# Patient Record
Sex: MALE | Race: WHITE | Employment: OTHER | URBAN - METROPOLITAN AREA
[De-identification: names, ages, dates, MRNs, and addresses within clinical notes are randomized per-mention and may not be internally consistent; named-entity substitution may affect disease eponyms.]

---

## 2017-02-16 ENCOUNTER — OFFICE VISIT (OUTPATIENT)
Dept: FAMILY MEDICINE | Facility: CLINIC | Age: 72
End: 2017-02-16

## 2017-02-16 VITALS
TEMPERATURE: 98.3 F | BODY MASS INDEX: 24.4 KG/M2 | HEIGHT: 68 IN | SYSTOLIC BLOOD PRESSURE: 138 MMHG | HEART RATE: 66 BPM | DIASTOLIC BLOOD PRESSURE: 70 MMHG | OXYGEN SATURATION: 98 % | WEIGHT: 161 LBS

## 2017-02-16 DIAGNOSIS — J43.9 PULMONARY EMPHYSEMA, UNSPECIFIED EMPHYSEMA TYPE (H): ICD-10-CM

## 2017-02-16 DIAGNOSIS — E78.2 MIXED HYPERLIPIDEMIA: ICD-10-CM

## 2017-02-16 DIAGNOSIS — Z23 NEED FOR VACCINATION: ICD-10-CM

## 2017-02-16 DIAGNOSIS — I10 ESSENTIAL HYPERTENSION, BENIGN: Primary | ICD-10-CM

## 2017-02-16 DIAGNOSIS — I25.10 ATHEROSCLEROSIS OF NATIVE CORONARY ARTERY OF NATIVE HEART WITHOUT ANGINA PECTORIS: ICD-10-CM

## 2017-02-16 PROCEDURE — 90670 PCV13 VACCINE IM: CPT | Performed by: FAMILY MEDICINE

## 2017-02-16 PROCEDURE — 80053 COMPREHEN METABOLIC PANEL: CPT | Mod: 90 | Performed by: FAMILY MEDICINE

## 2017-02-16 PROCEDURE — 90471 IMMUNIZATION ADMIN: CPT | Performed by: FAMILY MEDICINE

## 2017-02-16 PROCEDURE — 36415 COLL VENOUS BLD VENIPUNCTURE: CPT | Performed by: FAMILY MEDICINE

## 2017-02-16 PROCEDURE — 80061 LIPID PANEL: CPT | Mod: 90 | Performed by: FAMILY MEDICINE

## 2017-02-16 PROCEDURE — 99214 OFFICE O/P EST MOD 30 MIN: CPT | Mod: 25 | Performed by: FAMILY MEDICINE

## 2017-02-16 RX ORDER — AMLODIPINE BESYLATE 5 MG/1
5 TABLET ORAL DAILY
Qty: 90 TABLET | Refills: 1 | Status: SHIPPED | OUTPATIENT
Start: 2017-02-16 | End: 2017-08-24

## 2017-02-16 RX ORDER — ATENOLOL 50 MG/1
50 TABLET ORAL DAILY
Qty: 90 TABLET | Refills: 1 | Status: SHIPPED | OUTPATIENT
Start: 2017-02-16 | End: 2017-11-27

## 2017-02-16 RX ORDER — SIMVASTATIN 20 MG
20 TABLET ORAL AT BEDTIME
Qty: 90 TABLET | Refills: 1 | Status: SHIPPED | OUTPATIENT
Start: 2017-02-16 | End: 2017-08-24

## 2017-02-16 NOTE — MR AVS SNAPSHOT
"              After Visit Summary   2/16/2017    Roberto Carlos Still    MRN: [id number]           Patient Information     Date Of Birth          1945        Visit Information        Provider Department      2/16/2017 9:30 AM Leonard Palacios MD [facility]        Today's Diagnoses     Essential hypertension, benign    -  1    Atherosclerosis of native coronary artery of native heart without angina pectoris        Mixed hyperlipidemia        Pulmonary emphysema, unspecified emphysema type (H)        Need for vaccination           Follow-ups after your visit        Follow-up notes from your care team     Return in about 6 months (around 8/16/2017).      Who to contact     If you have questions or need follow up information about today's clinic visit or your schedule please contact [facility] directly at [phone].  Normal or non-critical lab and imaging results will be communicated to you by MyChart, letter or phone within 4 business days after the clinic has received the results. If you do not hear from us within 7 days, please contact the clinic through MyChart or phone. If you have a critical or abnormal lab result, we will notify you by phone as soon as possible.  Submit refill requests through [employer] or call your pharmacy and they will forward the refill request to us. Please allow 3 business days for your refill to be completed.          Additional Information About Your Visit        Care EveryWhere ID     This is your Care EveryWhere ID. This could be used by other organizations to access your [facility] medical records  [id number]        Your Vitals Were     Pulse Temperature Height Pulse Oximetry BMI (Body Mass Index)       66 98.3  F (36.8  C) (Oral) 1.727 m (5' 8\") 98% 24.48 kg/m2        Blood Pressure from Last 3 Encounters:   02/16/17 138/70   08/29/16 122/70   03/29/16 130/80    Weight from Last 3 Encounters:   02/16/17 73 kg (161 lb)   08/29/16 " 71.7 kg (158 lb)   03/29/16 73.5 kg (162 lb 2 oz)              We Performed the Following     COMPREHENSIVE METABOLIC PANEL (QUEST) XCMP     Lipid Profile (QUEST)     PNEUMOCOCCAL CONJ VACCINE 13 VALENT IM     VACCINE ADMINISTRATION, INITIAL     VENOUS COLLECTION          Where to get your medicines      These medications were sent to [facility] - [place], [place] - [address]  [address] [place] , [facility] [address]    Hours:  24-hours Phone:  [phone]     amLODIPine 5 MG tablet    atenolol 50 MG tablet    simvastatin 20 MG tablet          Primary Care Provider Office Phone # Fax #    Leonard Palacios [phone]       [facility] [address] [facility] [address]  [facility] [address]        Thank you!     Thank you for choosing [facility]  for your care. Our goal is always to provide you with excellent care. Hearing back from our patients is one way we can continue to improve our services. Please take a few minutes to complete the written survey that you may receive in the mail after your visit with us. Thank you!             Your Updated Medication List - Protect others around you: Learn how to safely use, store and throw away your medicines at www.disposemymeds.org.          This list is accurate as of: 2/16/17 11:43 AM.  Always use your most recent med list.                   Brand Name Dispense Instructions for use    albuterol (2.5 MG/3ML) 0.083% neb solution          amLODIPine 5 MG tablet    NORVASC    90 tablet    Take 1 tablet (5 mg) by mouth daily       aspirin 325 MG EC tablet     100    1 tab po QD (Once per day)       atenolol 50 MG tablet    TENORMIN    90 tablet    Take 1 tablet (50 mg) by mouth daily       Ipratropium-Albuterol  MCG/ACT inhaler    COMBIVENT RESPIMAT    12 g    Inhale 1 puff into the lungs 4 times daily. Not to exceed 6 doses per day.       order for DME     1 Device    Equipment  being ordered: Oxygen and overnight oxymetry       predniSONE 10 MG tablet    DELTASONE         simvastatin 20 MG tablet    ZOCOR    90 tablet    Take 1 tablet (20 mg) by mouth At Bedtime

## 2017-02-16 NOTE — LETTER
[facility]  [facility] [address]  [place]  [address]    February 21, 2017            Roberto Carlos Still  [address] [address] [facility] [address] [address]                  Dear Roberto Carlos Still      LAB RESULTS:     The results of your recent Blood Sugar, Kidney Tests, Lipid profile and Liver Panel were NORMAL.  Looks great!   If you have any further questions or problems, please contact our office at [phone].          Leonard Palacios MD

## 2017-02-16 NOTE — PROGRESS NOTES
SUBJECTIVE:                                                    Roberto Carlos Still is a 72 year old male who presents to clinic today for the following health issues:        Hyperlipidemia Follow-Up      Rate your low fat/cholesterol diet?: good    Taking statin?  Yes, no muscle aches from statin    Other lipid medications/supplements?:  none     Hypertension Follow-up      Outpatient blood pressures are not being checked.    Low Salt Diet: no added salt     Vascular Disease Follow-up:  Coronary Artery Disease (CAD)      Chest pain or pressure, left side neck or arm pain: No    Shortness of breath/increased sweats/nausea with exertion: No    Pain in calves walking 1-2 blocks: No    Worsened or new symptoms since last visit: No    Nitroglycerin use: no    Daily aspirin use: Yes     COPD Follow-Up    Symptoms are currently: stable    Current fatigue or dyspnea with ambulation: none    Shortness of breath: stable    Increased or change in Cough/Sputum: No    Fever(s): No        Any ER/UC or hospital admissions since your last visit? No     History   Smoking Status     Former Smoker     Packs/day: 0.50     Years: 35.00   Smokeless Tobacco     Never Used     No results found for: FEV1, HPY3HWE       Amount of exercise or physical activity: 2-3 days/week for an average of 15-30 minutes    Problems taking medications regularly: No    Medication side effects: none  Diet: regular (no restrictions)        Problem list and histories reviewed & adjusted, as indicated.  Additional history: as documented    Patient Active Problem List   Diagnosis     Coronary atherosclerosis     Essential hypertension, benign     ACP (advance care planning)     Health Care Home     COPD (chronic obstructive pulmonary disease) (H)     Mixed hyperlipidemia     Past Surgical History   Procedure Laterality Date     C repr asd w bypass  5/97     One vessel angioplasty and stint     C appendectomy       Eye exam established pt  12/06     Hcl psa,  diagnostic (tumor marker)  3/06     normal     Removal of sperm duct(s)       Vasectomy     Hc colonoscopy thru stoma, diagnostic  6/05       Social History   Substance Use Topics     Smoking status: Former Smoker     Packs/day: 0.50     Years: 35.00     Smokeless tobacco: Never Used     Alcohol use 7.0 oz/week     Family History   Problem Relation Age of Onset     Alzheimer Disease Mother      HEART DISEASE Father      CANCER No family hx of      DIABETES No family hx of          Current Outpatient Prescriptions   Medication Sig Dispense Refill     amLODIPine (NORVASC) 5 MG tablet Take 1 tablet (5 mg) by mouth daily 90 tablet 1     atenolol (TENORMIN) 50 MG tablet Take 1 tablet (50 mg) by mouth daily 90 tablet 1     simvastatin (ZOCOR) 20 MG tablet Take 1 tablet (20 mg) by mouth At Bedtime 90 tablet 1     ASPIRIN 325 MG OR TBEC 1 tab po QD (Once per day) 100 3     [DISCONTINUED] amLODIPine (NORVASC) 5 MG tablet Take 1 tablet (5 mg) by mouth daily 90 tablet 1     [DISCONTINUED] atenolol (TENORMIN) 50 MG tablet Take 1 tablet (50 mg) by mouth daily 90 tablet 1     [DISCONTINUED] simvastatin (ZOCOR) 20 MG tablet Take 1 tablet (20 mg) by mouth At Bedtime 90 tablet 1     order for DME Equipment being ordered: Oxygen and overnight oxymetry 1 Device 0     albuterol (2.5 MG/3ML) 0.083% nebulizer solution   0     predniSONE (DELTASONE) 10 MG tablet   0     Ipratropium-Albuterol (COMBIVENT RESPIMAT)  MCG/ACT inhaler Inhale 1 puff into the lungs 4 times daily. Not to exceed 6 doses per day. 12 g 0     Allergies   Allergen Reactions     No Known Allergies      Recent Labs   Lab Test  08/29/16   0920  03/01/16   0931  09/01/15   0945   LDL  71  79  76   HDL  81  79  72   TRIG  45  48  59   ALT  14  12  12   CR  0.87  0.83  0.82   GFRESTIMATED  86  87  88   POTASSIUM  4.6  4.9  4.7      BP Readings from Last 3 Encounters:   02/16/17 138/70   08/29/16 122/70   03/29/16 130/80    Wt Readings from Last 3 Encounters:   02/16/17  "73 kg (161 lb)   08/29/16 71.7 kg (158 lb)   03/29/16 73.5 kg (162 lb 2 oz)                    ROS:  Constitutional, HEENT, cardiovascular, pulmonary, gi and gu systems are negative, except as otherwise noted.    OBJECTIVE:                                                    /70 (BP Location: Right arm, Cuff Size: Adult Large)  Pulse 66  Temp 98.3  F (36.8  C) (Oral)  Ht 1.727 m (5' 8\")  Wt 73 kg (161 lb)  SpO2 98%  BMI 24.48 kg/m2  Body mass index is 24.48 kg/(m^2).   GENERAL: healthy, alert and no distress  NECK: no adenopathy, no asymmetry, masses, or scars and thyroid normal to palpation  RESP: lungs clear to auscultation - no rales, rhonchi or wheezes  CV: regular rate and rhythm, normal S1 S2, no S3 or S4, no murmur, click or rub, no peripheral edema and peripheral pulses strong  ABDOMEN: soft, nontender, no hepatosplenomegaly, no masses and bowel sounds normal  MS: no gross musculoskeletal defects noted, no edema         ASSESSMENT:                                                        PLAN:                                                    (I10) Essential hypertension, benign  (primary encounter diagnosis)  Comment: well controlled  Plan: amLODIPine (NORVASC) 5 MG tablet, atenolol         (TENORMIN) 50 MG tablet, COMPREHENSIVE         METABOLIC PANEL (QUEST) XCMP, VENOUS COLLECTION        continue current medications at current doses     (I25.10) Atherosclerosis of native coronary artery of native heart without angina pectoris  Comment: stable symptomatically   Plan: atenolol (TENORMIN) 50 MG tablet, simvastatin         (ZOCOR) 20 MG tablet, Lipid Profile (QUEST),         COMPREHENSIVE METABOLIC PANEL (QUEST) XCMP,         VENOUS COLLECTION        continue current medications at current doses     (E78.2) Mixed hyperlipidemia  Comment: stable symptomatically   Plan: simvastatin (ZOCOR) 20 MG tablet, Lipid Profile        (QUEST), VENOUS COLLECTION        continue current medications at current doses " "pending labs        (J43.9) Pulmonary emphysema, unspecified emphysema type (H)  Comment: stable symptomatically , followed by Dr Whittington  Plan: continue current medications at current doses -overdue for f/u    (Z23) Need for vaccination  Comment:   Plan: PNEUMOCOCCAL CONJ VACCINE 13 VALENT IM, VACCINE        ADMINISTRATION, INITIAL               BMI:   Estimated body mass index is 24.48 kg/(m^2) as calculated from the following:    Height as of this encounter: 1.727 m (5' 8\").    Weight as of this encounter: 73 kg (161 lb).   Weight management plan: Discussed healthy diet and exercise guidelines and patient will follow up in 6 months in clinic to re-evaluate.      FUTURE APPOINTMENTS:       - Follow-up visit in 6mo  Work on weight loss  Regular exercise    Leonard Palacios MD  [facility]      "

## 2017-02-16 NOTE — NURSING NOTE
Roberto Carlos Still is here today for a fasting med check    Pre-Visit Screening :  Immunizations : up to date (Prevnar 13 needed today)  Colon Screening : Patient Declines  Asthma Action Test/Plan : NA  PHQ9/GAD7 :  Up to date  BP done on the right arm, with a large sized cuff.  Pulse - regular  My Chart - declines    CLASSIFICATION OF OVERWEIGHT AND OBESITY BY BMI                         Obesity Class           BMI(kg/m2)  Underweight                                    < 18.5  Normal                                         18.5-24.9  Overweight                                     25.0-29.9  OBESITY                     I                  30.0-34.9                              II                 35.0-39.9  EXTREME OBESITY             III                >40                             Patient's  BMI Body mass index is 24.48 kg/(m^2).  http://hin.nhlbi.nih.gov/menuplanner/menu.cgi  Questioned patient about current smoking habits.  Pt. quit smoking some time ago.  Marguerite Dalton, [facility]

## 2017-02-17 LAB
ALBUMIN SERPL-MCNC: 4.1 G/DL (ref 3.6–5.1)
ALBUMIN/GLOB SERPL: 1.6 (CALC) (ref 1–2.5)
ALP SERPL-CCNC: 74 U/L (ref 40–115)
ALT SERPL-CCNC: 11 U/L (ref 9–46)
AST SERPL-CCNC: 19 U/L (ref 10–35)
BILIRUB SERPL-MCNC: 1.2 MG/DL (ref 0.2–1.2)
BUN SERPL-MCNC: 11 MG/DL (ref 7–25)
BUN/CREATININE RATIO: ABNORMAL (CALC) (ref 6–22)
CALCIUM SERPL-MCNC: 9.2 MG/DL (ref 8.6–10.3)
CHLORIDE SERPLBLD-SCNC: 100 MMOL/L (ref 98–110)
CHOLEST SERPL-MCNC: 162 MG/DL (ref 125–200)
CHOLEST/HDLC SERPL: 2 (CALC)
CO2 SERPL-SCNC: 32 MMOL/L (ref 20–31)
CREAT SERPL-MCNC: 0.74 MG/DL (ref 0.7–1.18)
EGFR AFRICAN AMERICAN - QUEST: 105 ML/MIN/1.73M2
GFR SERPL CREATININE-BSD FRML MDRD: 91 ML/MIN/1.73M2
GLOBULIN, CALCULATED - QUEST: 2.5 G/DL (CALC) (ref 1.9–3.7)
GLUCOSE - QUEST: 94 MG/DL (ref 65–99)
HDLC SERPL-MCNC: 82 MG/DL
LDLC SERPL CALC-MCNC: 71 MG/DL (CALC)
NONHDLC SERPL-MCNC: 80 MG/DL (CALC)
POTASSIUM SERPL-SCNC: 5.2 MMOL/L (ref 3.5–5.3)
PROT SERPL-MCNC: 6.6 G/DL (ref 6.1–8.1)
SODIUM SERPL-SCNC: 140 MMOL/L (ref 135–146)
TRIGL SERPL-MCNC: 43 MG/DL

## 2017-03-31 ENCOUNTER — TRANSFERRED RECORDS (OUTPATIENT)
Dept: FAMILY MEDICINE | Facility: CLINIC | Age: 72
End: 2017-03-31

## 2017-08-24 ENCOUNTER — OFFICE VISIT (OUTPATIENT)
Dept: FAMILY MEDICINE | Facility: CLINIC | Age: 72
End: 2017-08-24

## 2017-08-24 VITALS
WEIGHT: 159.6 LBS | OXYGEN SATURATION: 96 % | TEMPERATURE: 98.5 F | HEART RATE: 58 BPM | HEIGHT: 68 IN | BODY MASS INDEX: 24.19 KG/M2 | SYSTOLIC BLOOD PRESSURE: 148 MMHG | DIASTOLIC BLOOD PRESSURE: 76 MMHG

## 2017-08-24 DIAGNOSIS — I25.10 ATHEROSCLEROSIS OF NATIVE CORONARY ARTERY OF NATIVE HEART WITHOUT ANGINA PECTORIS: ICD-10-CM

## 2017-08-24 DIAGNOSIS — E78.2 MIXED HYPERLIPIDEMIA: ICD-10-CM

## 2017-08-24 DIAGNOSIS — J43.9 PULMONARY EMPHYSEMA, UNSPECIFIED EMPHYSEMA TYPE (H): Primary | ICD-10-CM

## 2017-08-24 DIAGNOSIS — I10 ESSENTIAL HYPERTENSION, BENIGN: ICD-10-CM

## 2017-08-24 PROCEDURE — 80061 LIPID PANEL: CPT | Mod: 90 | Performed by: FAMILY MEDICINE

## 2017-08-24 PROCEDURE — 36415 COLL VENOUS BLD VENIPUNCTURE: CPT | Performed by: FAMILY MEDICINE

## 2017-08-24 PROCEDURE — 80053 COMPREHEN METABOLIC PANEL: CPT | Mod: 90 | Performed by: FAMILY MEDICINE

## 2017-08-24 PROCEDURE — 99214 OFFICE O/P EST MOD 30 MIN: CPT | Performed by: FAMILY MEDICINE

## 2017-08-24 RX ORDER — METOPROLOL SUCCINATE 100 MG/1
100 TABLET, EXTENDED RELEASE ORAL DAILY
Qty: 90 TABLET | Refills: 1 | Status: SHIPPED | OUTPATIENT
Start: 2017-08-24 | End: 2019-03-14

## 2017-08-24 RX ORDER — AMLODIPINE BESYLATE 5 MG/1
5 TABLET ORAL DAILY
Qty: 90 TABLET | Refills: 1 | Status: SHIPPED | OUTPATIENT
Start: 2017-08-24 | End: 2018-02-27

## 2017-08-24 RX ORDER — SIMVASTATIN 20 MG
20 TABLET ORAL AT BEDTIME
Qty: 90 TABLET | Refills: 1 | Status: SHIPPED | OUTPATIENT
Start: 2017-08-24 | End: 2018-02-27

## 2017-08-24 NOTE — LETTER
August 28, 2017      Roberto Carlos Still  [address]  [place] [address]            Roberto Carlos Still     The results of your recent Kidney Tests, Lipid profile and Liver Panel were within normal limits.    The blood sugar is also still running high so make sure you are working to decrease sugars and carbohydrates in your diet and exercising regularly.       The results are now released on My Chart. Please contact me if you have further questions or concerns.    Sincerely,    NALLELY Palacios M.D.     Resulted Orders   Lipid Profile (QUEST)   Result Value Ref Range    Cholesterol 167 <200 mg/dL    HDL Cholesterol 78 >40 mg/dL    Triglycerides 44 <150 mg/dL    LDL Cholesterol Calculated 76 mg/dL (calc)      Comment:      Reference range: <100     Desirable range <100 mg/dL for patients with CHD or  diabetes and <70 mg/dL for diabetic patients with  known heart disease.     The Edilson calculation is a validated novel   method that provides better accuracy than the   Friedewald equation in the estimation of LDL-C,   particularly when TG levels are 150-400 mg/dL and   LDL-C levels are lower than 70 mg/dL.  Reference:  Satya MCCONNELL et al. Comparison of a Novel   Method vs the Friedewald Equation for Estimating   Low-Density Lipoprotein Cholesterol Levels From the   Standard Lipid Profile. LAI. 2013;310(19): 7218-3991.     For additional information, please refer to  http://education.[employer]/faq/LFT467  (This link is being provided for informational/  educational purposes only.)      Cholesterol/HDL Ratio 2.1 <5.0 (calc)    Non HDL Cholesterol 89 <130 mg/dL (calc)      Comment:      For patients with diabetes plus 1 major ASCVD risk   factor, treating to a non-HDL-C goal of <100 mg/dL   (LDL-C of <70 mg/dL) is considered a therapeutic   option.     COMPREHENSIVE METABOLIC PANEL (QUEST) XCMP   Result Value Ref Range    Glucose 104 (H) 65 - 99 mg/dL      Comment:                     Fasting reference interval     For someone without known diabetes, a glucose value  between 100 and 125 mg/dL is consistent with  prediabetes and should be confirmed with a  follow-up test.         Urea Nitrogen 11 7 - 25 mg/dL    Creatinine 0.68 (L) 0.70 - 1.18 mg/dL      Comment:      For patients >49 years of age, the reference limit  for Creatinine is approximately 13% higher for people  identified as -American.         GFR Estimate 94 > OR = 60 mL/min/1.73m2    EGFR African American 109 > OR = 60 mL/min/1.73m2    BUN/Creatinine Ratio 16 6 - 22 (calc)    Sodium 139 135 - 146 mmol/L    Potassium 4.7 3.5 - 5.3 mmol/L    Chloride 98 98 - 110 mmol/L    Carbon Dioxide 32 (H) 20 - 31 mmol/L    Calcium 9.5 8.6 - 10.3 mg/dL    Protein Total 6.8 6.1 - 8.1 g/dL    Albumin 4.4 3.6 - 5.1 g/dL    Globulin Calculated 2.4 1.9 - 3.7 g/dL (calc)    A/G Ratio 1.8 1.0 - 2.5 (calc)    Bilirubin Total 1.2 0.2 - 1.2 mg/dL    Alkaline Phosphatase 73 40 - 115 U/L    AST 21 10 - 35 U/L    ALT 14 9 - 46 U/L       If you have any questions or concerns, please call the clinic at the number listed above.       Sincerely,        Leonard Palacios MD

## 2017-08-24 NOTE — NURSING NOTE
Roberto Carlos Still is here today for a fasting medication recheck.    Pre-Visit Screening :  Immunizations : up to date  Colon Screening : Patient Declines  Asthma Action Test/Plan : Up to date  PHQ9/GAD7 :  NA    Pulse - regular  My Chart - declines    CLASSIFICATION OF OVERWEIGHT AND OBESITY BY BMI                         Obesity Class           BMI(kg/m2)  Underweight                                    < 18.5  Normal                                         18.5-24.9  Overweight                                     25.0-29.9  OBESITY                     I                  30.0-34.9                              II                 35.0-39.9  EXTREME OBESITY             III                >40                             Patient's  BMI Body mass index is 24.27 kg/(m^2).  http://hin.nhlbi.nih.gov/menuplanner/menu.cgi  Questioned patient about current smoking habits.  Pt. quit smoking some time ago.    Marguerite Dalton, [facility]

## 2017-08-24 NOTE — PROGRESS NOTES
SUBJECTIVE:                                                    Roberto Carlos Still is a 72 year old male who presents to clinic today for the following health issues:      Hyperlipidemia Follow-Up      Rate your low fat/cholesterol diet?: good    Taking statin?  Yes, no muscle aches from statin    Other lipid medications/supplements?:  none    Hypertension Follow-up      Outpatient blood pressures are not being checked.    Low Salt Diet: no added salt    Vascular Disease Follow-up:  Coronary Artery Disease (CAD)      Chest pain or pressure, left side neck or arm pain: No    Shortness of breath/increased sweats/nausea with exertion: No    Pain in calves walking 1-2 blocks: No    Worsened or new symptoms since last visit: No    Nitroglycerin use: no    Daily aspirin use: Yes    COPD Follow-Up    Symptoms are currently: stable    Current fatigue or dyspnea with ambulation: none    Shortness of breath: stable    Increased or change in Cough/Sputum: No    Fever(s): No        Any ER/UC or hospital admissions since your last visit? No     History   Smoking Status     Former Smoker     Packs/day: 0.50     Years: 35.00   Smokeless Tobacco     Never Used     No results found for: FEV1, EIN3QDB            Amount of exercise or physical activity: 4-5 days/week for an average of 15-30 minutes    Problems taking medications regularly: No    Medication side effects: none  Diet: regular (no restrictions)          Problem list and histories reviewed & adjusted, as indicated.  Additional history: as documented    Patient Active Problem List   Diagnosis     Coronary atherosclerosis     Essential hypertension, benign     ACP (advance care planning)     Health Care Home     COPD (chronic obstructive pulmonary disease) (H)     Mixed hyperlipidemia     Past Surgical History:   Procedure Laterality Date     C APPENDECTOMY       C REPR ASD W BYPASS  5/97    One vessel angioplasty and stint     EYE EXAM ESTABLISHED PT  12/06     HC COLONOSCOPY  THRU STOMA, DIAGNOSTIC  6/05     HCL PSA, DIAGNOSTIC (TUMOR MARKER)  3/06    normal     REMOVAL OF SPERM DUCT(S)      Vasectomy       Social History   Substance Use Topics     Smoking status: Former Smoker     Packs/day: 0.50     Years: 35.00     Smokeless tobacco: Never Used     Alcohol use 7.0 oz/week     Family History   Problem Relation Age of Onset     Alzheimer Disease Mother      HEART DISEASE Father      CANCER No family hx of      DIABETES No family hx of          Current Outpatient Prescriptions   Medication Sig Dispense Refill     simvastatin (ZOCOR) 20 MG tablet Take 1 tablet (20 mg) by mouth At Bedtime 90 tablet 1     amLODIPine (NORVASC) 5 MG tablet Take 1 tablet (5 mg) by mouth daily 90 tablet 1     metoprolol (TOPROL-XL) 100 MG 24 hr tablet Take 1 tablet (100 mg) by mouth daily 90 tablet 1     atenolol (TENORMIN) 50 MG tablet Take 1 tablet (50 mg) by mouth daily 90 tablet 1     albuterol (2.5 MG/3ML) 0.083% nebulizer solution   0     predniSONE (DELTASONE) 10 MG tablet   0     Ipratropium-Albuterol (COMBIVENT RESPIMAT)  MCG/ACT inhaler Inhale 1 puff into the lungs 4 times daily. Not to exceed 6 doses per day. 12 g 0     ASPIRIN 325 MG OR TBEC 1 tab po QD (Once per day) 100 3     [DISCONTINUED] amLODIPine (NORVASC) 5 MG tablet Take 1 tablet (5 mg) by mouth daily 90 tablet 1     [DISCONTINUED] simvastatin (ZOCOR) 20 MG tablet Take 1 tablet (20 mg) by mouth At Bedtime 90 tablet 1     order for DME Equipment being ordered: Oxygen and overnight oxymetry 1 Device 0     Allergies   Allergen Reactions     No Known Allergies      Recent Labs   Lab Test  02/16/17   0953  08/29/16   0920  03/01/16   0931   LDL  71  71  79   HDL  82  81  79   TRIG  43  45  48   ALT  11  14  12   CR  0.74  0.87  0.83   GFRESTIMATED  91  86  87   POTASSIUM  5.2  4.6  4.9      BP Readings from Last 3 Encounters:   08/24/17 148/76   02/16/17 138/70   08/29/16 122/70    Wt Readings from Last 3 Encounters:   08/24/17 72.4 kg  "(159 lb 9.6 oz)   02/16/17 73 kg (161 lb)   08/29/16 71.7 kg (158 lb)                          ROS:  Constitutional, HEENT, cardiovascular, pulmonary, gi and gu systems are negative, except as otherwise noted.      OBJECTIVE:   /76 (BP Location: Left arm, Patient Position: Chair, Cuff Size: Adult Large)  Pulse 58  Temp 98.5  F (36.9  C) (Oral)  Ht 1.727 m (5' 8\")  Wt 72.4 kg (159 lb 9.6 oz)  SpO2 96%  BMI 24.27 kg/m2  Body mass index is 24.27 kg/(m^2).   GENERAL: healthy, alert and no distress  EYES: Eyes grossly normal to inspection, PERRL and conjunctivae and sclerae normal  HENT: ear canals and TM's normal, nose and mouth without ulcers or lesions  NECK: no adenopathy, no asymmetry, masses, or scars and thyroid normal to palpation  RESP: lungs clear to auscultation - no rales, rhonchi or wheezes  CV: regular rate and rhythm, normal S1 S2, no S3 or S4, no murmur, click or rub, does have slight RLE 1 + to ankle peripheral edema and peripheral pulses strong  ABDOMEN: soft, nontender, no hepatosplenomegaly, no masses and bowel sounds normal  MS: no gross musculoskeletal defects noted, no edema  SKIN: no suspicious lesions or rashes  SKIN: left scapular cutaneous horn noted  NEURO: Normal strength and tone, mentation intact and speech normal  PSYCH: mentation appears normal, affect normal/bright        ASSESSMENT:       PLAN:   (J43.9) Pulmonary emphysema, unspecified emphysema type (H)  (primary encounter diagnosis)  Comment: stable, 24 hours O2, sees Dr Whittington-now once yearly  Plan: continue current medications at current doses     (E78.2) Mixed hyperlipidemia  Comment: controlled  Plan: simvastatin (ZOCOR) 20 MG tablet, Lipid Profile        (QUEST), COMPREHENSIVE METABOLIC PANEL (QUEST)         XCMP, VENOUS COLLECTION        continue current medications at current doses     (I25.10) Atherosclerosis of native coronary artery of native heart without angina pectoris  Comment: well controlled, pt has chosen " "not to see cardiology any longer  Plan: simvastatin (ZOCOR) 20 MG tablet, metoprolol         (TOPROL-XL) 100 MG 24 hr tablet, COMPREHENSIVE         METABOLIC PANEL (QUEST) XCMP, VENOUS COLLECTION        continue current medications at current doses     (I10) Essential hypertension, benign  Comment: borderline today  Plan: amLODIPine (NORVASC) 5 MG tablet, metoprolol         (TOPROL-XL) 100 MG 24 hr tablet, COMPREHENSIVE         METABOLIC PANEL (QUEST) XCMP, VENOUS COLLECTION        continue current medications at current doses Check blood pressure readings outside of the clinic several times per week, write down values, and follow up if elevated within the next several weeks. Blood pressure can be checked at the firestation, drugstore,  or any valid site.       BMI:   Estimated body mass index is 24.27 kg/(m^2) as calculated from the following:    Height as of this encounter: 1.727 m (5' 8\").    Weight as of this encounter: 72.4 kg (159 lb 9.6 oz).           FUTURE APPOINTMENTS:       - Follow-up visit in 6 mo  Regular exercise    Leonard Palacios MD  [facility]  "

## 2017-08-24 NOTE — MR AVS SNAPSHOT
"              After Visit Summary   8/24/2017    Roberto Carlos Still    MRN: [id number]           Patient Information     Date Of Birth          1945        Visit Information        Provider Department      8/24/2017 10:30 AM Leonard Palacios MD [facility]        Today's Diagnoses     Pulmonary emphysema, unspecified emphysema type (H)    -  1    Mixed hyperlipidemia        Atherosclerosis of native coronary artery of native heart without angina pectoris        Essential hypertension, benign           Follow-ups after your visit        Follow-up notes from your care team     Return in about 6 months (around 2/24/2018).      Who to contact     If you have questions or need follow up information about today's clinic visit or your schedule please contact [facility] directly at [phone].  Normal or non-critical lab and imaging results will be communicated to you by MyChart, letter or phone within 4 business days after the clinic has received the results. If you do not hear from us within 7 days, please contact the clinic through MyChart or phone. If you have a critical or abnormal lab result, we will notify you by phone as soon as possible.  Submit refill requests through [employer] or call your pharmacy and they will forward the refill request to us. Please allow 3 business days for your refill to be completed.          Additional Information About Your Visit        Care EveryWhere ID     This is your Care EveryWhere ID. This could be used by other organizations to access your [facility] medical records  [id number]        Your Vitals Were     Pulse Temperature Height Pulse Oximetry BMI (Body Mass Index)       58 98.5  F (36.9  C) (Oral) 1.727 m (5' 8\") 96% 24.27 kg/m2        Blood Pressure from Last 3 Encounters:   08/24/17 148/76   02/16/17 138/70   08/29/16 122/70    Weight from Last 3 Encounters:   08/24/17 72.4 kg (159 lb 9.6 oz)   02/16/17 73 kg (161 lb) "   08/29/16 71.7 kg (158 lb)              We Performed the Following     COMPREHENSIVE METABOLIC PANEL (QUEST) XCMP     Lipid Profile (QUEST)     VENOUS COLLECTION          Today's Medication Changes          These changes are accurate as of: 8/24/17 10:52 AM.  If you have any questions, ask your nurse or doctor.               Start taking these medicines.        Dose/Directions    metoprolol 100 MG 24 hr tablet   Commonly known as:  TOPROL-XL   Used for:  Atherosclerosis of native coronary artery of native heart without angina pectoris, Essential hypertension, benign   Started by:  Leonard Palacios MD        Dose:  100 mg   Take 1 tablet (100 mg) by mouth daily   Quantity:  90 tablet   Refills:  1            Where to get your medicines      These medications were sent to [place], [place] - [address] [place] [address]  [address] [place] [address] [address] [address]    Hours:  24-hours Phone:  [phone]     amLODIPine 5 MG tablet    metoprolol 100 MG 24 hr tablet    simvastatin 20 MG tablet                Primary Care Provider Office Phone # Fax #    Leonard Palacios [phone] [address]  [facility] [address]        Equal Access to Services     [facility] AH: Hadii aad ku hadasho Soomaali, waaxda luqadaha, qaybta kaalmada adeegyada, waxay idiin hayaan adejackson degrootaraheaven jaimes . So [facility] [phone].    ATENCIÓN: Si habla español, tiene a chaney disposición servicios gratuitos de asistencia lingüística. Llame al [phone].    We comply with applicable federal civil rights laws and [place] laws. We do not discriminate on the basis of race, color, national origin, age, disability sex, sexual orientation or gender identity.            Thank you!     Thank you for choosing [facility]  for your care. Our goal is always to provide you with excellent care. Hearing back from our patients is one way we can continue to  improve our services. Please take a few minutes to complete the written survey that you may receive in the mail after your visit with us. Thank you!             Your Updated Medication List - Protect others around you: Learn how to safely use, store and throw away your medicines at www.disposemymeds.org.          This list is accurate as of: 8/24/17 10:52 AM.  Always use your most recent med list.                   Brand Name Dispense Instructions for use Diagnosis    albuterol (2.5 MG/3ML) 0.083% neb solution           amLODIPine 5 MG tablet    NORVASC    90 tablet    Take 1 tablet (5 mg) by mouth daily    Essential hypertension, benign       aspirin 325 MG EC tablet     100    1 tab po QD (Once per day)    Coronary atherosclerosis of unspecified type of vessel, native or graft       atenolol 50 MG tablet    TENORMIN    90 tablet    Take 1 tablet (50 mg) by mouth daily    Essential hypertension, benign, Atherosclerosis of native coronary artery of native heart without angina pectoris       Ipratropium-Albuterol  MCG/ACT inhaler    COMBIVENT RESPIMAT    12 g    Inhale 1 puff into the lungs 4 times daily. Not to exceed 6 doses per day.    Chronic obstructive pulmonary disease, unspecified COPD type (H)       metoprolol 100 MG 24 hr tablet    TOPROL-XL    90 tablet    Take 1 tablet (100 mg) by mouth daily    Atherosclerosis of native coronary artery of native heart without angina pectoris, Essential hypertension, benign       order for DME     1 Device    Equipment being ordered: Oxygen and overnight oxymetry    Pulmonary emphysema, unspecified emphysema type (H)       predniSONE 10 MG tablet    DELTASONE          simvastatin 20 MG tablet    ZOCOR    90 tablet    Take 1 tablet (20 mg) by mouth At Bedtime    Mixed hyperlipidemia, Atherosclerosis of native coronary artery of native heart without angina pectoris

## 2017-08-25 LAB
ALBUMIN SERPL-MCNC: 4.4 G/DL (ref 3.6–5.1)
ALBUMIN/GLOB SERPL: 1.8 (CALC) (ref 1–2.5)
ALP SERPL-CCNC: 73 U/L (ref 40–115)
ALT SERPL-CCNC: 14 U/L (ref 9–46)
AST SERPL-CCNC: 21 U/L (ref 10–35)
BILIRUB SERPL-MCNC: 1.2 MG/DL (ref 0.2–1.2)
BUN SERPL-MCNC: 11 MG/DL (ref 7–25)
BUN/CREATININE RATIO: 16 (CALC) (ref 6–22)
CALCIUM SERPL-MCNC: 9.5 MG/DL (ref 8.6–10.3)
CHLORIDE SERPLBLD-SCNC: 98 MMOL/L (ref 98–110)
CHOLEST SERPL-MCNC: 167 MG/DL
CHOLEST/HDLC SERPL: 2.1 (CALC)
CO2 SERPL-SCNC: 32 MMOL/L (ref 20–31)
CREAT SERPL-MCNC: 0.68 MG/DL (ref 0.7–1.18)
EGFR AFRICAN AMERICAN - QUEST: 109 ML/MIN/1.73M2
GFR SERPL CREATININE-BSD FRML MDRD: 94 ML/MIN/1.73M2
GLOBULIN, CALCULATED - QUEST: 2.4 G/DL (CALC) (ref 1.9–3.7)
GLUCOSE - QUEST: 104 MG/DL (ref 65–99)
HDLC SERPL-MCNC: 78 MG/DL
LDLC SERPL CALC-MCNC: 76 MG/DL (CALC)
NONHDLC SERPL-MCNC: 89 MG/DL (CALC)
POTASSIUM SERPL-SCNC: 4.7 MMOL/L (ref 3.5–5.3)
PROT SERPL-MCNC: 6.8 G/DL (ref 6.1–8.1)
SODIUM SERPL-SCNC: 139 MMOL/L (ref 135–146)
TRIGL SERPL-MCNC: 44 MG/DL

## 2017-08-28 ENCOUNTER — TELEPHONE (OUTPATIENT)
Dept: FAMILY MEDICINE | Facility: CLINIC | Age: 72
End: 2017-08-28

## 2017-08-28 NOTE — TELEPHONE ENCOUNTER
Patient called and left a msg stating that he had been in to see you on 8/24 and at that visit you told him that his ankles were a little puffy. He forgot to ask you at that time, what does that mean or what should he be doing about it if anything ?    Please advise and I will call the patient back at [phone] (per patient ok to LM)    Thank-You,  Brynn

## 2017-08-28 NOTE — TELEPHONE ENCOUNTER
It just means gravity is not his friend- as we get older we aren't as good at getting fluids back up to the heart.  Staying active and elevating legs when sitting around can help.  If getting worseneeds to come in

## 2017-11-27 DIAGNOSIS — I25.10 ATHEROSCLEROSIS OF NATIVE CORONARY ARTERY OF NATIVE HEART WITHOUT ANGINA PECTORIS: ICD-10-CM

## 2017-11-27 DIAGNOSIS — I10 ESSENTIAL HYPERTENSION, BENIGN: ICD-10-CM

## 2017-11-28 RX ORDER — ATENOLOL 50 MG/1
TABLET ORAL
Qty: 90 TABLET | Refills: 0 | Status: SHIPPED | OUTPATIENT
Start: 2017-11-28 | End: 2018-02-27

## 2017-11-28 NOTE — TELEPHONE ENCOUNTER
Pending Prescriptions:                       Disp   Refills    atenolol (TENORMIN) 50 MG tablet [Pharmac*90 tab*             Sig: Take 1 tablet by mouth once daily.    Last refill was 2-  Last ov was 8-  Please change qty, fax deny or send to [facility] and let them know if pt is due for a fasting or non fasting ov  [place]as  [phone] (home)

## 2017-12-22 ENCOUNTER — OFFICE VISIT (OUTPATIENT)
Dept: FAMILY MEDICINE | Facility: CLINIC | Age: 72
End: 2017-12-22

## 2017-12-22 VITALS
SYSTOLIC BLOOD PRESSURE: 146 MMHG | HEART RATE: 64 BPM | DIASTOLIC BLOOD PRESSURE: 70 MMHG | WEIGHT: 162 LBS | TEMPERATURE: 98.3 F | OXYGEN SATURATION: 96 % | HEIGHT: 68 IN | BODY MASS INDEX: 24.55 KG/M2

## 2017-12-22 DIAGNOSIS — M79.605 PAIN OF LEFT LOWER EXTREMITY: Primary | ICD-10-CM

## 2017-12-22 DIAGNOSIS — J44.9 CHRONIC OBSTRUCTIVE PULMONARY DISEASE, UNSPECIFIED COPD TYPE (H): ICD-10-CM

## 2017-12-22 PROCEDURE — 99213 OFFICE O/P EST LOW 20 MIN: CPT | Performed by: PHYSICIAN ASSISTANT

## 2017-12-22 RX ORDER — BUDESONIDE AND FORMOTEROL FUMARATE DIHYDRATE 80; 4.5 UG/1; UG/1
2 AEROSOL RESPIRATORY (INHALATION) 2 TIMES DAILY
Qty: 3 INHALER | Refills: 1 | COMMUNITY
Start: 2017-12-22 | End: 2019-08-29

## 2017-12-22 NOTE — NURSING NOTE
"Roberto Carlos Still is here today for swelling in his lower left leg x2 days. Pain the first day and the leg is warm and red.    Pre-visit Planning:    Immunizations -up to date  Colonoscopy -Patient Declines  Asthma test --NA  PHQ9 -NA  WILLIAN 7 -NA      Vitals:    BP Cuff left  Arm with large Cuff  PULSE regular  162 lbs 0 oz and 5' 8\"  CLASSIFICATION OF OVERWEIGHT AND OBESITY BY BMI                        Obesity Class           BMI(kg/m2)  Underweight                                    < 18.5  Normal                                         18.5-24.9  Overweight                                     25.0-29.9  OBESITY                     I                  30.0-34.9                             II                 35.0-39.9  EXTREME OBESITY             III                >40      Patient's  BMI Body mass index is 24.63 kg/(m^2).  Http://hin.nhlbi.nih.gov/menuplanner/menu.cgi    My Chart: - declines     Tobacco Use: Questioned patient about current smoking habits.  Pt. quit smoking some time ago.    The patient has verbalized that it is ok to leave a detailed voice message on the patient's home voicemail with results/recommendations from this visit.     Verified Home Phone Today: [phone]    Marguerite Dalton CMA      "

## 2017-12-22 NOTE — NURSING NOTE
Telephone call made to schedule Roberto Carlos Still for the following: L lower leg ? DVT    Date: 12/22/17  Time: 1pm  Place: [facility]  Tiana    Read and Call

## 2017-12-22 NOTE — MR AVS SNAPSHOT
"              After Visit Summary   12/22/2017    Roberto Carlos Still    MRN: [id number]           Patient Information     Date Of Birth          1945        Visit Information        Provider Department      12/22/2017 11:15 AM Jazmín Russo PA-C [facility]        Today's Diagnoses     Pain of left lower extremity    -  1    Chronic obstructive pulmonary disease, unspecified COPD type (H)           Follow-ups after your visit        Follow-up notes from your care team     Return if symptoms worsen or fail to improve.      Who to contact     If you have questions or need follow up information about today's clinic visit or your schedule please contact [facility] directly at [phone].  Normal or non-critical lab and imaging results will be communicated to you by MyChart, letter or phone within 4 business days after the clinic has received the results. If you do not hear from us within 7 days, please contact the clinic through MyChart or phone. If you have a critical or abnormal lab result, we will notify you by phone as soon as possible.  Submit refill requests through [employer] or call your pharmacy and they will forward the refill request to us. Please allow 3 business days for your refill to be completed.          Additional Information About Your Visit        Care EveryWhere ID     This is your Care EveryWhere ID. This could be used by other organizations to access your [facility] medical records  [id number]        Your Vitals Were     Pulse Temperature Height Pulse Oximetry BMI (Body Mass Index)       64 98.3  F (36.8  C) (Oral) 1.727 m (5' 8\") 96% 24.63 kg/m2        Blood Pressure from Last 3 Encounters:   12/22/17 146/70   08/24/17 148/76   02/16/17 138/70    Weight from Last 3 Encounters:   12/22/17 73.5 kg (162 lb)   08/24/17 72.4 kg (159 lb 9.6 oz)   02/16/17 73 kg (161 lb)              We Performed the Following     US Lower Extremity Venous Duplex " Left        Primary Care Provider Office Phone # Fax #    Leonard Palacios [phone] [address] [facility] [address]  [facility] [address]        Equal Access to Services     [employer] [place] : Hadii nacho ku jimmyo Soomaali, waaxda luqadaha, qaybta kaalmada adeegyada, kiran mobleynayeli calderon. So [facility] [phone].    ATENCIÓN: Si habla español, tiene a chaney disposición servicios gratuitos de asistencia lingüística. Llame al [phone].    We comply with applicable federal civil rights laws and [place] laws. We do not discriminate on the basis of race, color, national origin, age, disability, sex, sexual orientation, or gender identity.            Thank you!     Thank you for choosing [facility]  for your care. Our goal is always to provide you with excellent care. Hearing back from our patients is one way we can continue to improve our services. Please take a few minutes to complete the written survey that you may receive in the mail after your visit with us. Thank you!             Your Updated Medication List - Protect others around you: Learn how to safely use, store and throw away your medicines at www.disposemymeds.org.          This list is accurate as of: 12/22/17 11:59 PM.  Always use your most recent med list.                   Brand Name Dispense Instructions for use Diagnosis    albuterol (2.5 MG/3ML) 0.083% neb solution           amLODIPine 5 MG tablet    NORVASC    90 tablet    Take 1 tablet (5 mg) by mouth daily    Essential hypertension, benign       aspirin 325 MG EC tablet     100    1 tab po QD (Once per day)    Coronary atherosclerosis of unspecified type of vessel, native or graft       atenolol 50 MG tablet    TENORMIN    90 tablet    Take 1 tablet by mouth once daily.    Essential hypertension, benign, Atherosclerosis of native coronary artery of native heart without angina pectoris       metoprolol 100 MG 24 hr tablet     TOPROL-XL    90 tablet    Take 1 tablet (100 mg) by mouth daily    Atherosclerosis of native coronary artery of native heart without angina pectoris, Essential hypertension, benign       order for DME     1 Device    Equipment being ordered: Oxygen and overnight oxymetry    Pulmonary emphysema, unspecified emphysema type (H)       simvastatin 20 MG tablet    ZOCOR    90 tablet    Take 1 tablet (20 mg) by mouth At Bedtime    Mixed hyperlipidemia, Atherosclerosis of native coronary artery of native heart without angina pectoris       SYMBICORT 80-4.5 MCG/ACT Inhaler   Generic drug:  budesonide-formoterol     3 Inhaler    Inhale 2 puffs into the lungs 2 times daily    Chronic obstructive pulmonary disease, unspecified COPD type (H)       tiotropium 2.5 MCG/ACT inhalation aerosol    SPIRIVA RESPIMAT    4 g    Inhale 2 puffs into the lungs daily    Chronic obstructive pulmonary disease, unspecified COPD type (H)

## 2017-12-22 NOTE — PROGRESS NOTES
"CC: Leg swelling.     History:  Cholo is here with 2 day history of significant swelling in left foot and ankle. Some pain in posterior calf and ankle. No fever, chills, sweats, chest pain, palpitations, increased shortness of breath.     He is on chronic oxygen for COPD 24 hours.     PMH, MEDICATIONS, ALLERGIES, SOCIAL AND FAMILY HISTORY in [facility] and reviewed by me personally.      ROS negative other than the symptoms noted above in the HPI.        Examination   /70 (BP Location: Left arm, Patient Position: Chair, Cuff Size: Adult Large)  Pulse 64  Temp 98.3  F (36.8  C) (Oral)  Ht 1.727 m (5' 8\")  Wt 73.5 kg (162 lb)  SpO2 96%  BMI 24.63 kg/m2       Constitutional: Sitting comfortably, in no acute distress. Vital signs noted. BP mildly elevated.   Eyes: pupils equal round reactive to light and accomodation, extra ocular movements intact  Neck:  no adenopathy, trachea midline and normal to palpation  Cardiovascular:  regular rate and rhythm, no murmurs, clicks, or gallops  Respiratory:  normal respiratory rate and rhythm, lungs clear to auscultation  SKIN: No jaundice/pallor. Mild erythema in lower leg/ankle on left side. No discharge.  M/S: ROM of left ankle intact. 2+ pitted edema throughout. PT, DP pulses intact, but 2/4.  Psychiatric: mentation appears normal and affect normal/bright        A/P    ICD-10-CM    1. Pain of left lower extremity M79.605 US Lower Extremity Venous Duplex Left   2. Chronic obstructive pulmonary disease, unspecified COPD type (H) J44.9 tiotropium (SPIRIVA RESPIMAT) 2.5 MCG/ACT inhalation aerosol     budesonide-formoterol (SYMBICORT) 80-4.5 MCG/ACT Inhaler       DISCUSSION: Discussed with Dr. Palacios his PCP. Concerned with recent flight home from [place] plus calve pain. Ordered doppler US of left lower extremity, which ruled out DVT, but did comment on the edema in left calve and distal to that as well. Recommended:  Purchase compression stocking for left side. Keep elevated " as much as possible especially when sleeping.  Avoid a lot of time on feet. Limit salt in diet. Contact me next week to consider diuretic.     follow up visit: As needed    [facility]

## 2017-12-26 ENCOUNTER — TELEPHONE (OUTPATIENT)
Dept: FAMILY MEDICINE | Facility: CLINIC | Age: 72
End: 2017-12-26

## 2017-12-26 NOTE — TELEPHONE ENCOUNTER
Roberto Carlos Still called the clinic support line with the following:    He is wondering if you spoke to [facility] as to why his leg was swelling.    He can be reached at [phone] ([email or web address])

## 2017-12-26 NOTE — TELEPHONE ENCOUNTER
Has been using compression stockings and elevating leg. Improved to 90% better from his previous appt. Will continue to monitor and contact me if continues to be a problem.

## 2018-02-09 ENCOUNTER — OFFICE VISIT (OUTPATIENT)
Dept: FAMILY MEDICINE | Facility: CLINIC | Age: 73
End: 2018-02-09

## 2018-02-09 VITALS
TEMPERATURE: 98.3 F | HEIGHT: 68 IN | OXYGEN SATURATION: 97 % | BODY MASS INDEX: 24.46 KG/M2 | HEART RATE: 70 BPM | WEIGHT: 161.4 LBS | SYSTOLIC BLOOD PRESSURE: 136 MMHG | DIASTOLIC BLOOD PRESSURE: 80 MMHG

## 2018-02-09 DIAGNOSIS — R19.06 EPIGASTRIC MASS: Primary | ICD-10-CM

## 2018-02-09 PROCEDURE — 99213 OFFICE O/P EST LOW 20 MIN: CPT | Performed by: FAMILY MEDICINE

## 2018-02-09 NOTE — MR AVS SNAPSHOT
"              After Visit Summary   2/9/2018    Roberto Carlos Still    MRN: [id number]           Patient Information     Date Of Birth          1945        Visit Information        Provider Department      2/9/2018 2:00 PM Leonard Palacios MD [facility]        Today's Diagnoses     Epigastric mass    -  1       Follow-ups after your visit        Who to contact     If you have questions or need follow up information about today's clinic visit or your schedule please contact [facility] directly at [phone].  Normal or non-critical lab and imaging results will be communicated to you by MyChart, letter or phone within 4 business days after the clinic has received the results. If you do not hear from us within 7 days, please contact the clinic through MyChart or phone. If you have a critical or abnormal lab result, we will notify you by phone as soon as possible.  Submit refill requests through [employer] or call your pharmacy and they will forward the refill request to us. Please allow 3 business days for your refill to be completed.          Additional Information About Your Visit        Care EveryWhere ID     This is your Care EveryWhere ID. This could be used by other organizations to access your [facility] medical records  [id number]        Your Vitals Were     Pulse Temperature Height Pulse Oximetry BMI (Body Mass Index)       70 98.3  F (36.8  C) (Oral) 1.727 m (5' 8\") 97% 24.54 kg/m2        Blood Pressure from Last 3 Encounters:   02/09/18 136/80   12/22/17 146/70   08/24/17 148/76    Weight from Last 3 Encounters:   02/09/18 73.2 kg (161 lb 6.4 oz)   12/22/17 73.5 kg (162 lb)   08/24/17 72.4 kg (159 lb 9.6 oz)              Today, you had the following     No orders found for display       Primary Care Provider Office Phone # Fax #    Leonard Palacios [phone] [address]  [facility] [address]        Equal " Access to Services     [facility]: Hadii nacho melendez venkatesh Butler, waj luisda luqadaha, qaybta kaalbenjamin boonenatalielizzie, kiran degrootmary janeheaven calderon. So [facility] [phone].    ATENCIÓN: Si habla español, tiene a chaney disposición servicios gratuitos de asistencia lingüística. Llame al [phone].    We comply with applicable federal civil rights laws and [place] laws. We do not discriminate on the basis of race, color, national origin, age, disability, sex, sexual orientation, or gender identity.            Thank you!     Thank you for choosing [facility]  for your care. Our goal is always to provide you with excellent care. Hearing back from our patients is one way we can continue to improve our services. Please take a few minutes to complete the written survey that you may receive in the mail after your visit with us. Thank you!             Your Updated Medication List - Protect others around you: Learn how to safely use, store and throw away your medicines at www.disposemymeds.org.          This list is accurate as of 2/9/18  2:04 PM.  Always use your most recent med list.                   Brand Name Dispense Instructions for use Diagnosis    albuterol (2.5 MG/3ML) 0.083% neb solution           amLODIPine 5 MG tablet    NORVASC    90 tablet    Take 1 tablet (5 mg) by mouth daily    Essential hypertension, benign       aspirin 325 MG EC tablet     100    1 tab po QD (Once per day)    Coronary atherosclerosis of unspecified type of vessel, native or graft       atenolol 50 MG tablet    TENORMIN    90 tablet    Take 1 tablet by mouth once daily.    Essential hypertension, benign, Atherosclerosis of native coronary artery of native heart without angina pectoris       metoprolol succinate 100 MG 24 hr tablet    TOPROL-XL    90 tablet    Take 1 tablet (100 mg) by mouth daily    Atherosclerosis of native coronary artery of native heart without angina pectoris, Essential hypertension,  benign       order for DME     1 Device    Equipment being ordered: Oxygen and overnight oxymetry    Pulmonary emphysema, unspecified emphysema type (H)       simvastatin 20 MG tablet    ZOCOR    90 tablet    Take 1 tablet (20 mg) by mouth At Bedtime    Mixed hyperlipidemia, Atherosclerosis of native coronary artery of native heart without angina pectoris       SYMBICORT 80-4.5 MCG/ACT Inhaler   Generic drug:  budesonide-formoterol     3 Inhaler    Inhale 2 puffs into the lungs 2 times daily    Chronic obstructive pulmonary disease, unspecified COPD type (H)       tiotropium 2.5 MCG/ACT inhalation aerosol    SPIRIVA RESPIMAT    4 g    Inhale 2 puffs into the lungs daily    Chronic obstructive pulmonary disease, unspecified COPD type (H)

## 2018-02-09 NOTE — PROGRESS NOTES
"SUBJECTIVE:  Roberto Carlos Still, a 73 year old male scheduled an appointment to discuss the following issues:  Epigastric mass  Pt noted skin lump 2 weeks ago in mid abd-no pain, has not changed    Pt otherwise feels fine-not fasting today but plans to return for med check in a month  Medical, social, surgical, and family histories reviewed.    ROS:  C: NEGATIVE for fever, chills  R: NEGATIVE for significant cough or SOB  CV: NEGATIVE for chest pain, palpitations   GI: NEGATIVE for nausea, abdominal pain, heartburn, or change in bowel habits  : NEGATIVE for frequency, dysuria, or hematuria  M: NEGATIVE for significant arthralgias or myalgia    OBJECTIVE:  /80 (BP Location: Right arm, Patient Position: Chair, Cuff Size: Adult Regular)  Pulse 70  Temp 98.3  F (36.8  C) (Oral)  Ht 1.727 m (5' 8\")  Wt 73.2 kg (161 lb 6.4 oz)  SpO2 97%  BMI 24.54 kg/m2  EXAM:  GENERAL APPEARANCE: healthy, alert and no distress  RESP: lungs clear to auscultation - no rales, rhonchi or wheezes  CV: regular rates and rhythm, normal S1 S2, no S3 or S4 and no murmur, click or rub -  ABDOMEN:  soft, nontender, no HSM or masses and bowel sounds normal  SKIN: 2-3 cm slightly oblong subcutaneous mass-nontender, mobile    ASSESSMENT/PLAN:  (R19.06) Epigastric mass  (primary encounter diagnosis)  Comment: suspect cyst vs lipoma-no worrisome features  Plan: recommend observation-if gets bigger or causes symptoms may need removal     "

## 2018-02-09 NOTE — NURSING NOTE
Cholo is here for a lump he has had on his abdomin for a few weeks, only painful if pushing on it    Pre-Visit Screening :  Immunizations : up to date  Colon Screening : is up to date  Asthma Action Test/Plan : na  PHQ9/GAD7 :  Na    Pulse - regular  My Chart - declines    CLASSIFICATION OF OVERWEIGHT AND OBESITY BY BMI                         Obesity Class           BMI(kg/m2)  Underweight                                    < 18.5  Normal                                         18.5-24.9  Overweight                                     25.0-29.9  OBESITY                     I                  30.0-34.9                              II                 35.0-39.9  EXTREME OBESITY             III                >40                             Patient's  BMI Body mass index is 22.15 kg/(m^2).  http://hin.nhlbi.nih.gov/menuplanner/menu.cgi  Questioned patient about current smoking habits.  Pt. Quit some time ago.  The patient has verbalized that it is ok to leave a detailed voice message on the patient's cell phone with results/recommendations from this visit.       Verified [phone] phone number:

## 2018-02-27 ENCOUNTER — OFFICE VISIT (OUTPATIENT)
Dept: FAMILY MEDICINE | Facility: CLINIC | Age: 73
End: 2018-02-27

## 2018-02-27 VITALS
HEART RATE: 71 BPM | BODY MASS INDEX: 24.19 KG/M2 | SYSTOLIC BLOOD PRESSURE: 132 MMHG | WEIGHT: 159.6 LBS | TEMPERATURE: 98.6 F | OXYGEN SATURATION: 95 % | DIASTOLIC BLOOD PRESSURE: 76 MMHG | HEIGHT: 68 IN

## 2018-02-27 DIAGNOSIS — E78.2 MIXED HYPERLIPIDEMIA: Primary | ICD-10-CM

## 2018-02-27 DIAGNOSIS — I10 ESSENTIAL HYPERTENSION, BENIGN: ICD-10-CM

## 2018-02-27 DIAGNOSIS — J43.9 PULMONARY EMPHYSEMA, UNSPECIFIED EMPHYSEMA TYPE (H): ICD-10-CM

## 2018-02-27 DIAGNOSIS — I25.10 ATHEROSCLEROSIS OF NATIVE CORONARY ARTERY OF NATIVE HEART WITHOUT ANGINA PECTORIS: ICD-10-CM

## 2018-02-27 PROCEDURE — 99214 OFFICE O/P EST MOD 30 MIN: CPT | Performed by: FAMILY MEDICINE

## 2018-02-27 PROCEDURE — 80053 COMPREHEN METABOLIC PANEL: CPT | Mod: 90 | Performed by: FAMILY MEDICINE

## 2018-02-27 PROCEDURE — 36415 COLL VENOUS BLD VENIPUNCTURE: CPT | Performed by: FAMILY MEDICINE

## 2018-02-27 PROCEDURE — 80061 LIPID PANEL: CPT | Mod: 90 | Performed by: FAMILY MEDICINE

## 2018-02-27 RX ORDER — ATENOLOL 50 MG/1
50 TABLET ORAL DAILY
Qty: 90 TABLET | Refills: 1 | Status: SHIPPED | OUTPATIENT
Start: 2018-02-27 | End: 2018-08-28

## 2018-02-27 RX ORDER — AMLODIPINE BESYLATE 5 MG/1
5 TABLET ORAL DAILY
Qty: 90 TABLET | Refills: 1 | Status: SHIPPED | OUTPATIENT
Start: 2018-02-27 | End: 2018-08-28

## 2018-02-27 RX ORDER — SIMVASTATIN 20 MG
20 TABLET ORAL AT BEDTIME
Qty: 90 TABLET | Refills: 1 | Status: SHIPPED | OUTPATIENT
Start: 2018-02-27 | End: 2018-08-28

## 2018-02-27 NOTE — LETTER
February 28, 2018      Roberto Carlos Still  [address] [facility] [address] [place] [facility] [address]        Roberto Carlos Still     The results of your recent Kidney Tests, Lipid profile and Liver Panel were essentially within normal limits.     The blood sugar is still running high so make sure you are working to decrease sugars and carbohydrates in your diet and exercising regularly.  The results are now released on My Chart. Please contact me if you have further questions or concerns.    Sincerely,    NALLELY Palacios M.D.     Resulted Orders   Lipid Profile (QUEST)   Result Value Ref Range    Cholesterol 158 <200 mg/dL    HDL Cholesterol 71 >40 mg/dL    Triglycerides 44 <150 mg/dL    LDL Cholesterol Calculated 74 mg/dL (calc)      Comment:      Reference range: <100     Desirable range <100 mg/dL for patients with CHD or  diabetes and <70 mg/dL for diabetic patients with  known heart disease.     LDL-C is now calculated using the Edilson   calculation, which is a validated novel method providing   better accuracy than the Friedewald equation in the   estimation of LDL-C.   Satya SS et al. LAI. 2013;310(19): 8011-7028   (http://education.[employer]/faq/TOB101)      Cholesterol/HDL Ratio 2.2 <5.0 (calc)    Non HDL Cholesterol 87 <130 mg/dL (calc)      Comment:      For patients with diabetes plus 1 major ASCVD risk   factor, treating to a non-HDL-C goal of <100 mg/dL   (LDL-C of <70 mg/dL) is considered a therapeutic   option.     COMPREHENSIVE METABOLIC PANEL (QUEST) XCMP   Result Value Ref Range    Glucose 107 (H) 65 - 99 mg/dL      Comment:                    Fasting reference interval     For someone without known diabetes, a glucose value  between 100 and 125 mg/dL is consistent with  prediabetes and should be confirmed with a  follow-up test.         Urea Nitrogen 11 7 - 25 mg/dL    Creatinine 0.61 (L) 0.70 - 1.18 mg/dL      Comment:      For patients >49 years of age, the reference limit  for  Creatinine is approximately 13% higher for people  identified as -American.         GFR Estimate 98 > OR = 60 mL/min/1.73m2    EGFR African American 113 > OR = 60 mL/min/1.73m2    BUN/Creatinine Ratio 18 6 - 22 (calc)    Sodium 139 135 - 146 mmol/L    Potassium 5.0 3.5 - 5.3 mmol/L    Chloride 95 (L) 98 - 110 mmol/L    Carbon Dioxide 33 (H) 20 - 31 mmol/L    Calcium 9.4 8.6 - 10.3 mg/dL    Protein Total 6.7 6.1 - 8.1 g/dL    Albumin 3.8 3.6 - 5.1 g/dL    Globulin Calculated 2.9 1.9 - 3.7 g/dL (calc)    A/G Ratio 1.3 1.0 - 2.5 (calc)    Bilirubin Total 1.0 0.2 - 1.2 mg/dL    Alkaline Phosphatase 104 40 - 115 U/L    AST 16 10 - 35 U/L    ALT 9 9 - 46 U/L       If you have any questions or concerns, please call the clinic at the number listed above.       Sincerely,        Leonard Palacios MD

## 2018-02-27 NOTE — MR AVS SNAPSHOT
"              After Visit Summary   2/27/2018    Roberto Carlos Still    MRN: [id number]           Patient Information     Date Of Birth          1945        Visit Information        Provider Department      2/27/2018 9:45 AM Leonard Palacios MD [facility]        Today's Diagnoses     Mixed hyperlipidemia    -  1    Atherosclerosis of native coronary artery of native heart without angina pectoris        Essential hypertension, benign        Pulmonary emphysema, unspecified emphysema type (H)           Follow-ups after your visit        Follow-up notes from your care team     Return in about 6 months (around 8/27/2018).      Who to contact     If you have questions or need follow up information about today's clinic visit or your schedule please contact [facility] directly at [phone].  Normal or non-critical lab and imaging results will be communicated to you by MyChart, letter or phone within 4 business days after the clinic has received the results. If you do not hear from us within 7 days, please contact the clinic through MyChart or phone. If you have a critical or abnormal lab result, we will notify you by phone as soon as possible.  Submit refill requests through [employer] or call your pharmacy and they will forward the refill request to us. Please allow 3 business days for your refill to be completed.          Additional Information About Your Visit        Care EveryWhere ID     This is your Care EveryWhere ID. This could be used by other organizations to access your [facility] medical records  [id number]        Your Vitals Were     Pulse Temperature Height Pulse Oximetry BMI (Body Mass Index)       71 98.6  F (37  C) (Oral) 1.721 m (5' 7.75\") 95% 24.45 kg/m2        Blood Pressure from Last 3 Encounters:   02/27/18 132/76   02/09/18 136/80   12/22/17 146/70    Weight from Last 3 Encounters:   02/27/18 72.4 kg (159 lb 9.6 oz)   02/09/18 73.2 kg (161 lb " 6.4 oz)   12/22/17 73.5 kg (162 lb)              We Performed the Following     COMPREHENSIVE METABOLIC PANEL (QUEST) XCMP     Lipid Profile (QUEST)          Today's Medication Changes          These changes are accurate as of 2/27/18  9:58 AM.  If you have any questions, ask your nurse or doctor.               These medicines have changed or have updated prescriptions.        Dose/Directions    atenolol 50 MG tablet   Commonly known as:  TENORMIN   This may have changed:  See the new instructions.   Used for:  Essential hypertension, benign, Atherosclerosis of native coronary artery of native heart without angina pectoris   Changed by:  Leonard Palacios MD        Dose:  50 mg   Take 1 tablet (50 mg) by mouth daily   Quantity:  90 tablet   Refills:  1            Where to get your medicines      These medications [facility] [place] [facility] [place] - [address] [place] [address]  [address] [place] [address] [address] [place] [id number]    Hours:  24-hours Phone:  [phone]     amLODIPine 5 MG tablet    atenolol 50 MG tablet    simvastatin 20 MG tablet                Primary Care Provider Office Phone # Fax #    Leonard Palacios [phone] [address]   [facility] [address]        Equal Access to Services     CARRIE MCFARLANE : Hadii nacho ku hadasho Soomaali, waaxda luqadaha, qaybta kaalmada adeegyada, kiran cardenasin hayradhan vincent calderon. So [facility] [phone].    ATENCIÓN: Si habla español, tiene a chaney disposición servicios gratuitos de asistencia lingüística. Llame al [phone].    We comply with applicable federal civil rights laws and [place] laws. We do not discriminate on the basis of race, color, national origin, age, disability, sex, sexual orientation, or gender identity.            Thank you!     Thank you for choosing [facility]  for your care. Our goal is always to provide you with excellent care. Hearing back from  our patients is one way we can continue to improve our services. Please take a few minutes to complete the written survey that you may receive in the mail after your visit with us. Thank you!             Your Updated Medication List - Protect others around you: Learn how to safely use, store and throw away your medicines at www.disposemymeds.org.          This list is accurate as of 2/27/18  9:58 AM.  Always use your most recent med list.                   Brand Name Dispense Instructions for use Diagnosis    albuterol (2.5 MG/3ML) 0.083% neb solution           amLODIPine 5 MG tablet    NORVASC    90 tablet    Take 1 tablet (5 mg) by mouth daily    Essential hypertension, benign       aspirin 325 MG EC tablet     100    1 tab po QD (Once per day)    Coronary atherosclerosis of unspecified type of vessel, native or graft       atenolol 50 MG tablet    TENORMIN    90 tablet    Take 1 tablet (50 mg) by mouth daily    Essential hypertension, benign, Atherosclerosis of native coronary artery of native heart without angina pectoris       metoprolol succinate 100 MG 24 hr tablet    TOPROL-XL    90 tablet    Take 1 tablet (100 mg) by mouth daily    Atherosclerosis of native coronary artery of native heart without angina pectoris, Essential hypertension, benign       order for DME     1 Device    Equipment being ordered: Oxygen and overnight oxymetry    Pulmonary emphysema, unspecified emphysema type (H)       simvastatin 20 MG tablet    ZOCOR    90 tablet    Take 1 tablet (20 mg) by mouth At Bedtime    Mixed hyperlipidemia, Atherosclerosis of native coronary artery of native heart without angina pectoris       SYMBICORT 80-4.5 MCG/ACT Inhaler   Generic drug:  budesonide-formoterol     3 Inhaler    Inhale 2 puffs into the lungs 2 times daily    Chronic obstructive pulmonary disease, unspecified COPD type (H)       tiotropium 2.5 MCG/ACT inhalation aerosol    SPIRIVA RESPIMAT    4 g    Inhale 2 puffs into the lungs daily     Chronic obstructive pulmonary disease, unspecified COPD type (H)

## 2018-02-27 NOTE — NURSING NOTE
Roberto Carlos is here for med check    Pre-visit Screening:  Immunizations:  up to date  Colonoscopy:  No longer needed  Mammogram: No longer needed  Asthma Action Test/Plan:  MAGDALENO  PHQ9:  NA  GAD7:  NA  Questioned patient about current smoking habits Pt. quit smoking some time ago.  Ok to leave detailed message on voice mail for today's visit only Yes, phone # [phone]

## 2018-02-27 NOTE — PROGRESS NOTES
SUBJECTIVE:                                                    Roberto Carlos Still is a 73 year old male who presents to clinic today for the following health issues:      Hyperlipidemia Follow-Up      Rate your low fat/cholesterol diet?: good    Taking statin?  Yes, no muscle aches from statin    Other lipid medications/supplements?:  none    Hypertension Follow-up      Outpatient blood pressures are not being checked.    Low Salt Diet: no added salt    Vascular Disease Follow-up:  Coronary Artery Disease (CAD)      Chest pain or pressure, left side neck or arm pain: No    Shortness of breath/increased sweats/nausea with exertion: No    Pain in calves walking 1-2 blocks: No    Worsened or new symptoms since last visit: No    Nitroglycerin use: no    Daily aspirin use: Yes    COPD Follow-Up    Symptoms are currently: stable    Current fatigue or dyspnea with ambulation: stable     Shortness of breath: stable    Increased or change in Cough/Sputum: No    Fever(s): No        Any ER/UC or hospital admissions since your last visit? No     History   Smoking Status     Former Smoker     Packs/day: 0.50     Years: 35.00   Smokeless Tobacco     Never Used     No results found for: FEV1, PBR8FTW    Amount of exercise or physical activity: 1 day/week for an average of 15-30 minutes    Problems taking medications regularly: No    Medication side effects: none    Diet: regular (no restrictions)            Problem list and histories reviewed & adjusted, as indicated.  Additional history: as documented    Patient Active Problem List   Diagnosis     Coronary atherosclerosis     Essential hypertension, benign     ACP (advance care planning)     Health Care Home     COPD (chronic obstructive pulmonary disease) (H)     Mixed hyperlipidemia     Past Surgical History:   Procedure Laterality Date     C APPENDECTOMY       C REPR ASD W BYPASS  5/97    One vessel angioplasty and stint     EYE EXAM ESTABLISHED PT  12/06     HC COLONOSCOPY  THRU STOMA, DIAGNOSTIC  6/05     HCL PSA, DIAGNOSTIC (TUMOR MARKER)  3/06    normal     REMOVAL OF SPERM DUCT(S)      Vasectomy       Social History   Substance Use Topics     Smoking status: Former Smoker     Packs/day: 0.50     Years: 35.00     Smokeless tobacco: Never Used     Alcohol use 7.0 oz/week     Family History   Problem Relation Age of Onset     Alzheimer Disease Mother      HEART DISEASE Father      CANCER No family hx of      DIABETES No family hx of          Current Outpatient Prescriptions   Medication Sig Dispense Refill     tiotropium (SPIRIVA RESPIMAT) 2.5 MCG/ACT inhalation aerosol Inhale 2 puffs into the lungs daily 4 g 1     budesonide-formoterol (SYMBICORT) 80-4.5 MCG/ACT Inhaler Inhale 2 puffs into the lungs 2 times daily 3 Inhaler 1     atenolol (TENORMIN) 50 MG tablet Take 1 tablet by mouth once daily. 90 tablet 0     simvastatin (ZOCOR) 20 MG tablet Take 1 tablet (20 mg) by mouth At Bedtime 90 tablet 1     amLODIPine (NORVASC) 5 MG tablet Take 1 tablet (5 mg) by mouth daily 90 tablet 1     order for DME Equipment being ordered: Oxygen and overnight oxymetry 1 Device 0     albuterol (2.5 MG/3ML) 0.083% nebulizer solution   0     ASPIRIN 325 MG OR TBEC 1 tab po QD (Once per day) 100 3     metoprolol (TOPROL-XL) 100 MG 24 hr tablet Take 1 tablet (100 mg) by mouth daily 90 tablet 1     Allergies   Allergen Reactions     No Known Allergies      Recent Labs   Lab Test  08/24/17   1104  02/16/17   0953  08/29/16   0920   LDL  76  71  71   HDL  78  82  81   TRIG  44  43  45   ALT  14  11  14   CR  0.68*  0.74  0.87   GFRESTIMATED  94  91  86   POTASSIUM  4.7  5.2  4.6      BP Readings from Last 3 Encounters:   02/27/18 132/76   02/09/18 136/80   12/22/17 146/70    Wt Readings from Last 3 Encounters:   02/27/18 72.4 kg (159 lb 9.6 oz)   02/09/18 73.2 kg (161 lb 6.4 oz)   12/22/17 73.5 kg (162 lb)                  Labs reviewed in EPIC    ROS:  Constitutional, HEENT, cardiovascular, pulmonary, gi  "and gu systems are negative, except as otherwise noted.    OBJECTIVE:     /76 (BP Location: Left arm, Patient Position: Chair, Cuff Size: Adult Regular)  Pulse 71  Temp 98.6  F (37  C) (Oral)  Ht 1.721 m (5' 7.75\")  Wt 72.4 kg (159 lb 9.6 oz)  SpO2 95%  BMI 24.45 kg/m2  Body mass index is 24.45 kg/(m^2).   GENERAL: healthy, alert and no distress  EYES: Eyes grossly normal to inspection, PERRL and conjunctivae and sclerae normal  HENT: ear canals and TM's normal, nose and mouth without ulcers or lesions  NECK: no adenopathy, no asymmetry, masses, or scars and thyroid normal to palpation  RESP: lungs clear to auscultation - no rales, rhonchi or wheezes  CV: regular rate and rhythm, normal S1 S2, no S3 or S4, no murmur, click or rub, no peripheral edema and peripheral pulses strong  ABDOMEN: soft, nontender, no hepatosplenomegaly, no masses and bowel sounds normal  MS: no gross musculoskeletal defects noted, no edema  SKIN: no suspicious lesions or rashes  NEURO: Normal strength and tone, mentation intact and speech normal  PSYCH: mentation appears normal, affect normal/bright        ASSESSMENT:       PLAN:   (E78.2) Mixed hyperlipidemia  (primary encounter diagnosis)  Comment: controlled pending ,abs  Plan: simvastatin (ZOCOR) 20 MG tablet        continue current medications at current doses     (I25.10) Atherosclerosis of native coronary artery of native heart without angina pectoris  Comment: stable  Plan: simvastatin (ZOCOR) 20 MG tablet, atenolol         (TENORMIN) 50 MG tablet        continue current medications at current doses     (I10) Essential hypertension, benign  Comment: well controlled  Plan: atenolol (TENORMIN) 50 MG tablet, amLODIPine         (NORVASC) 5 MG tablet        continue current medications at current doses     (J43.9) Pulmonary emphysema, unspecified emphysema type (H)  Comment: stable-sees Dr Whittington in March  Plan: continue current medications at current doses     BMI:   Estimated " "body mass index is 24.45 kg/(m^2) as calculated from the following:    Height as of this encounter: 1.721 m (5' 7.75\").    Weight as of this encounter: 72.4 kg (159 lb 9.6 oz).         FUTURE APPOINTMENTS:       - Follow-up visit in 6 mo  Regular exercise    Leonard Palacios MD  [facility]      "

## 2018-02-28 LAB
ALBUMIN SERPL-MCNC: 3.8 G/DL (ref 3.6–5.1)
ALBUMIN/GLOB SERPL: 1.3 (CALC) (ref 1–2.5)
ALP SERPL-CCNC: 104 U/L (ref 40–115)
ALT SERPL-CCNC: 9 U/L (ref 9–46)
AST SERPL-CCNC: 16 U/L (ref 10–35)
BILIRUB SERPL-MCNC: 1 MG/DL (ref 0.2–1.2)
BUN SERPL-MCNC: 11 MG/DL (ref 7–25)
BUN/CREATININE RATIO: 18 (CALC) (ref 6–22)
CALCIUM SERPL-MCNC: 9.4 MG/DL (ref 8.6–10.3)
CHLORIDE SERPLBLD-SCNC: 95 MMOL/L (ref 98–110)
CHOLEST SERPL-MCNC: 158 MG/DL
CHOLEST/HDLC SERPL: 2.2 (CALC)
CO2 SERPL-SCNC: 33 MMOL/L (ref 20–31)
CREAT SERPL-MCNC: 0.61 MG/DL (ref 0.7–1.18)
EGFR AFRICAN AMERICAN - QUEST: 113 ML/MIN/1.73M2
GFR SERPL CREATININE-BSD FRML MDRD: 98 ML/MIN/1.73M2
GLOBULIN, CALCULATED - QUEST: 2.9 G/DL (CALC) (ref 1.9–3.7)
GLUCOSE - QUEST: 107 MG/DL (ref 65–99)
HDLC SERPL-MCNC: 71 MG/DL
LDLC SERPL CALC-MCNC: 74 MG/DL (CALC)
NONHDLC SERPL-MCNC: 87 MG/DL (CALC)
POTASSIUM SERPL-SCNC: 5 MMOL/L (ref 3.5–5.3)
PROT SERPL-MCNC: 6.7 G/DL (ref 6.1–8.1)
SODIUM SERPL-SCNC: 139 MMOL/L (ref 135–146)
TRIGL SERPL-MCNC: 44 MG/DL

## 2018-03-19 ENCOUNTER — TRANSFERRED RECORDS (OUTPATIENT)
Dept: FAMILY MEDICINE | Facility: CLINIC | Age: 73
End: 2018-03-19

## 2018-08-28 ENCOUNTER — OFFICE VISIT (OUTPATIENT)
Dept: FAMILY MEDICINE | Facility: CLINIC | Age: 73
End: 2018-08-28

## 2018-08-28 VITALS
DIASTOLIC BLOOD PRESSURE: 74 MMHG | SYSTOLIC BLOOD PRESSURE: 122 MMHG | TEMPERATURE: 98.4 F | BODY MASS INDEX: 24.29 KG/M2 | HEART RATE: 68 BPM | WEIGHT: 158.6 LBS | OXYGEN SATURATION: 98 %

## 2018-08-28 DIAGNOSIS — I10 ESSENTIAL HYPERTENSION, BENIGN: ICD-10-CM

## 2018-08-28 DIAGNOSIS — J43.9 PULMONARY EMPHYSEMA, UNSPECIFIED EMPHYSEMA TYPE (H): ICD-10-CM

## 2018-08-28 DIAGNOSIS — E78.2 MIXED HYPERLIPIDEMIA: Primary | ICD-10-CM

## 2018-08-28 DIAGNOSIS — I25.10 ATHEROSCLEROSIS OF NATIVE CORONARY ARTERY OF NATIVE HEART WITHOUT ANGINA PECTORIS: ICD-10-CM

## 2018-08-28 PROCEDURE — 80061 LIPID PANEL: CPT | Mod: 90 | Performed by: FAMILY MEDICINE

## 2018-08-28 PROCEDURE — 99214 OFFICE O/P EST MOD 30 MIN: CPT | Performed by: FAMILY MEDICINE

## 2018-08-28 PROCEDURE — 36415 COLL VENOUS BLD VENIPUNCTURE: CPT | Performed by: FAMILY MEDICINE

## 2018-08-28 PROCEDURE — 80053 COMPREHEN METABOLIC PANEL: CPT | Mod: 90 | Performed by: FAMILY MEDICINE

## 2018-08-28 RX ORDER — ATENOLOL 50 MG/1
50 TABLET ORAL DAILY
Qty: 90 TABLET | Refills: 1 | Status: SHIPPED | OUTPATIENT
Start: 2018-08-28 | End: 2019-05-23

## 2018-08-28 RX ORDER — SIMVASTATIN 20 MG
20 TABLET ORAL AT BEDTIME
Qty: 90 TABLET | Refills: 1 | Status: SHIPPED | OUTPATIENT
Start: 2018-08-28 | End: 2019-05-23

## 2018-08-28 RX ORDER — AMLODIPINE BESYLATE 5 MG/1
5 TABLET ORAL DAILY
Qty: 90 TABLET | Refills: 1 | Status: SHIPPED | OUTPATIENT
Start: 2018-08-28 | End: 2019-05-23

## 2018-08-28 NOTE — LETTER
August 29, 2018      Roberto Carlos Still  [address] [facility] [address]        Dear ,    We are writing to inform you of your test results.    Your test results fall within the expected range(s) or remain unchanged from previous results.  Please continue with current treatment plan. Looks good!     Resulted Orders   Lipid Profile (QUEST)   Result Value Ref Range    Cholesterol 165 <200 mg/dL    HDL Cholesterol 88 >40 mg/dL    Triglycerides 44 <150 mg/dL    LDL Cholesterol Calculated 64 mg/dL (calc)      Comment:      Reference range: <100     Desirable range <100 mg/dL for primary prevention;    <70 mg/dL for patients with CHD or diabetic patients   with > or = 2 CHD risk factors.     LDL-C is now calculated using the Edilson   calculation, which is a validated novel method providing   better accuracy than the Friedewald equation in the   estimation of LDL-C.   Satya MCCONNELL et al. LAI. 2013;310(19): 4517-5320   (http://education.[employer]/faq/TSA808)      Cholesterol/HDL Ratio 1.9 <5.0 (calc)    Non HDL Cholesterol 77 <130 mg/dL (calc)      Comment:      For patients with diabetes plus 1 major ASCVD risk   factor, treating to a non-HDL-C goal of <100 mg/dL   (LDL-C of <70 mg/dL) is considered a therapeutic   option.     COMPREHENSIVE METABOLIC PANEL (QUEST) XCMP   Result Value Ref Range    Glucose 103 (H) 65 - 99 mg/dL      Comment:                    Fasting reference interval     For someone without known diabetes, a glucose value  between 100 and 125 mg/dL is consistent with  prediabetes and should be confirmed with a  follow-up test.         Urea Nitrogen 11 7 - 25 mg/dL    Creatinine 0.63 (L) 0.70 - 1.18 mg/dL      Comment:      For patients >49 years of age, the reference limit  for Creatinine is approximately 13% higher for people  identified as -American.         GFR Estimate 96 > OR = 60 mL/min/1.73m2    EGFR African American 112 > OR = 60 mL/min/1.73m2     BUN/Creatinine Ratio 17 6 - 22 (calc)    Sodium 139 135 - 146 mmol/L    Potassium 4.8 3.5 - 5.3 mmol/L    Chloride 97 (L) 98 - 110 mmol/L    Carbon Dioxide 33 (H) 20 - 32 mmol/L    Calcium 9.4 8.6 - 10.3 mg/dL    Protein Total 6.7 6.1 - 8.1 g/dL    Albumin 4.3 3.6 - 5.1 g/dL    Globulin Calculated 2.4 1.9 - 3.7 g/dL (calc)    A/G Ratio 1.8 1.0 - 2.5 (calc)    Bilirubin Total 1.3 (H) 0.2 - 1.2 mg/dL    Alkaline Phosphatase 73 40 - 115 U/L    AST 19 10 - 35 U/L    ALT 12 9 - 46 U/L       If you have any questions or concerns, please call the clinic at the number listed above.       Sincerely,        Leonard Palacios MD

## 2018-08-28 NOTE — NURSING NOTE
Roberto Carlos is here for fasting med check    Pre-visit Screening:  Immunizations:  up to date  Colonoscopy:  is up to date  Mammogram: NA  Asthma Action Test/Plan:  MAGDALENO  PHQ9:  NA  GAD7:  NA  Questioned patient about current smoking habits Pt. quit smoking some time ago.  Ok to leave detailed message on voice mail for today's visit only Yes, phone # [phone]

## 2018-08-28 NOTE — PROGRESS NOTES
SUBJECTIVE:                                                    Roberto Carlos Still is a 73 year old male who presents to clinic today for the following health issues:      Hyperlipidemia Follow-Up      Rate your low fat/cholesterol diet?: good    Taking statin?  Yes, no muscle aches from statin    Other lipid medications/supplements?:  none    Hypertension Follow-up      Outpatient blood pressures are not being checked.    Low Salt Diet: no added salt    Vascular Disease Follow-up:  Coronary Artery Disease (CAD)-not following with cardiology any longer-stent 1997      Chest pain or pressure, left side neck or arm pain: No    Shortness of breath/increased sweats/nausea with exertion: No    Pain in calves walking 1-2 blocks: No    Worsened or new symptoms since last visit: No    Nitroglycerin use: no    Daily aspirin use: Yes    COPD Vvffge-Dq-Xw Hovda    Symptoms are currently: stable    Current fatigue or dyspnea with ambulation: none    Shortness of breath: stable    Increased or change in Cough/Sputum: No    Fever(s): No    Baseline ambulation without stopping to rest:  2 blocks. Able to walk up 3 flights of stairs without stopping to rest.    Any ER/UC or hospital admissions since your last visit? No     History   Smoking Status     Former Smoker     Packs/day: 0.50     Years: 35.00   Smokeless Tobacco     Never Used     No results found for: FEV1, GWQ4LIM    Amount of exercise or physical activity: 2-3 days/week for an average of 15-30 minutes    Problems taking medications regularly: No    Medication side effects: none    Diet: regular (no restrictions)            Problem list and histories reviewed & adjusted, as indicated.  Additional history: as documented    Patient Active Problem List   Diagnosis     Coronary atherosclerosis     Essential hypertension, benign     ACP (advance care planning)     Health Care Home     COPD (chronic obstructive pulmonary disease) (H)     Mixed hyperlipidemia     Past Surgical  History:   Procedure Laterality Date     C APPENDECTOMY       C REPR ASD W BYPASS  5/97    One vessel angioplasty and stint     EYE EXAM ESTABLISHED PT  12/06     HC COLONOSCOPY THRU STOMA, DIAGNOSTIC  6/05     HCL PSA, DIAGNOSTIC (TUMOR MARKER)  3/06    normal     REMOVAL OF SPERM DUCT(S)      Vasectomy       Social History   Substance Use Topics     Smoking status: Former Smoker     Packs/day: 0.50     Years: 35.00     Smokeless tobacco: Never Used     Alcohol use 7.0 oz/week     Family History   Problem Relation Age of Onset     Alzheimer Disease Mother      HEART DISEASE Father      Cancer No family hx of      Diabetes No family hx of          Current Outpatient Prescriptions   Medication Sig Dispense Refill     albuterol (2.5 MG/3ML) 0.083% nebulizer solution   0     amLODIPine (NORVASC) 5 MG tablet Take 1 tablet (5 mg) by mouth daily 90 tablet 1     ASPIRIN 325 MG OR TBEC 1 tab po QD (Once per day) 100 3     atenolol (TENORMIN) 50 MG tablet Take 1 tablet (50 mg) by mouth daily 90 tablet 1     budesonide-formoterol (SYMBICORT) 80-4.5 MCG/ACT Inhaler Inhale 2 puffs into the lungs 2 times daily 3 Inhaler 1     metoprolol (TOPROL-XL) 100 MG 24 hr tablet Take 1 tablet (100 mg) by mouth daily 90 tablet 1     order for DME Equipment being ordered: Oxygen and overnight oxymetry 1 Device 0     simvastatin (ZOCOR) 20 MG tablet Take 1 tablet (20 mg) by mouth At Bedtime 90 tablet 1     tiotropium (SPIRIVA RESPIMAT) 2.5 MCG/ACT inhalation aerosol Inhale 2 puffs into the lungs daily 4 g 1     [DISCONTINUED] amLODIPine (NORVASC) 5 MG tablet Take 1 tablet (5 mg) by mouth daily 90 tablet 1     [DISCONTINUED] atenolol (TENORMIN) 50 MG tablet Take 1 tablet (50 mg) by mouth daily 90 tablet 1     [DISCONTINUED] simvastatin (ZOCOR) 20 MG tablet Take 1 tablet (20 mg) by mouth At Bedtime 90 tablet 1     Allergies   Allergen Reactions     No Known Allergies      Recent Labs   Lab Test  02/27/18   1020  08/24/17   1104  02/16/17   0995    LDL  74  76  71   HDL  71  78  82   TRIG  44  44  43   ALT  9  14  11   CR  0.61*  0.68*  0.74   GFRESTIMATED  98  94  91   POTASSIUM  5.0  4.7  5.2      BP Readings from Last 3 Encounters:   08/28/18 122/74   02/27/18 132/76   02/09/18 136/80    Wt Readings from Last 3 Encounters:   08/28/18 71.9 kg (158 lb 9.6 oz)   02/27/18 72.4 kg (159 lb 9.6 oz)   02/09/18 73.2 kg (161 lb 6.4 oz)                    ROS:  Constitutional, HEENT, cardiovascular, pulmonary, gi and gu systems are negative, except as otherwise noted.    OBJECTIVE:     /74 (BP Location: Right arm, Patient Position: Sitting, Cuff Size: Adult Regular)  Pulse 68  Temp 98.4  F (36.9  C) (Oral)  Wt 71.9 kg (158 lb 9.6 oz)  SpO2 98%  BMI 24.29 kg/m2  Body mass index is 24.29 kg/(m^2).   GENERAL: healthy, alert and no distress  EYES: Eyes grossly normal to inspection, PERRL and conjunctivae and sclerae normal  HENT: ear canals and TM's normal, nose and mouth without ulcers or lesions  NECK: no adenopathy, no asymmetry, masses, or scars and thyroid normal to palpation  RESP: lungs clear to auscultation - no rales, rhonchi or wheezes  CV: regular rate and rhythm, normal S1 S2, no S3 or S4, no murmur, click or rub, no peripheral edema and peripheral pulses strong  ABDOMEN: soft, nontender, no hepatosplenomegaly, no masses and bowel sounds normal  MS: no gross musculoskeletal defects noted, no edema  SKIN: no suspicious lesions or rashes  PSYCH: mentation appears normal, affect normal/bright        ASSESSMENT:       PLAN:   (E78.2) Mixed hyperlipidemia  (primary encounter diagnosis)  Comment: control uncertain  Plan: simvastatin (ZOCOR) 20 MG tablet, Lipid Profile        (QUEST), COMPREHENSIVE METABOLIC PANEL (QUEST)         XC, VENOUS COLLECTION        continue current medications at current doses     (I25.10) Atherosclerosis of native coronary artery of native heart without angina pectoris  Comment: stable  Plan: simvastatin (ZOCOR) 20 MG  "tablet, atenolol         (TENORMIN) 50 MG tablet, COMPREHENSIVE         METABOLIC PANEL (QUEST) XCMP, VENOUS COLLECTION        continue current medications at current doses     (I10) Essential hypertension, benign  Comment: well controlled  Plan: amLODIPine (NORVASC) 5 MG tablet, atenolol         (TENORMIN) 50 MG tablet, COMPREHENSIVE         METABOLIC PANEL (QUEST) XCMP, VENOUS COLLECTION        continue current medications at current doses     (J43.9) Pulmonary emphysema, unspecified emphysema type (H)  Comment: well controlled  Plan: continue current medications at current doses     BMI:   Estimated body mass index is 24.29 kg/(m^2) as calculated from the following:    Height as of 2/27/18: 1.721 m (5' 7.75\").    Weight as of this encounter: 71.9 kg (158 lb 9.6 oz).         FUTURE APPOINTMENTS:       - Follow-up visit in 6 mo  Regular exercise    Leonard Palacios MD  [facility]      "

## 2018-08-28 NOTE — MR AVS SNAPSHOT
After Visit Summary   8/28/2018    Roberto Carlos Still    MRN: [id number]           Patient Information     Date Of Birth          1945        Visit Information        Provider Department      8/28/2018 10:15 AM Leonard Palacios MD [facility]        Today's Diagnoses     Mixed hyperlipidemia    -  1    Atherosclerosis of native coronary artery of native heart without angina pectoris        Essential hypertension, benign        Pulmonary emphysema, unspecified emphysema type (H)           Follow-ups after your visit        Follow-up notes from your care team     Return in about 6 months (around 2/28/2019).      Who to contact     If you have questions or need follow up information about today's clinic visit or your schedule please contact [facility] directly at [phone].  Normal or non-critical lab and imaging results will be communicated to you by MyChart, letter or phone within 4 business days after the clinic has received the results. If you do not hear from us within 7 days, please contact the clinic through MyChart or phone. If you have a critical or abnormal lab result, we will notify you by phone as soon as possible.  Submit refill requests through [facility] or call your pharmacy and they will forward the refill request to us. Please allow 3 business days for your refill to be completed.          Additional Information About Your Visit        Care EveryWhere ID     This is your Care EveryWhere ID. This could be used by other organizations to access your [facility] medical records  [id number]        Your Vitals Were     Pulse Temperature Pulse Oximetry BMI (Body Mass Index)          68 98.4  F (36.9  C) (Oral) 98% 24.29 kg/m2         Blood Pressure from Last 3 Encounters:   08/28/18 122/74   02/27/18 132/76   02/09/18 136/80    Weight from Last 3 Encounters:   08/28/18 71.9 kg (158 lb 9.6 oz)   02/27/18 72.4 kg (159 lb 9.6 oz)   02/09/18  73.2 kg (161 lb 6.4 oz)              We Performed the Following     COMPREHENSIVE METABOLIC PANEL (QUEST) XCMP     Lipid Profile (QUEST)     VENOUS COLLECTION          Where to get your medicines      These medications were sent to [facility] [place], [place] - [address]  [address] [place] , [facility] [address]    Hours:  24-hours Phone:  [phone]     amLODIPine 5 MG tablet    atenolol 50 MG tablet    simvastatin 20 MG tablet          Primary Care Provider Office Phone # Fax #    Leonard Palacios [phone] [address] [facility] [address]  [facility] [address]        Equal Access to Services     [facility] : Hadii nacho melendez hadasho Sorichy, waaxda luqadaha, qaybta kaalmada adeegyada, kiran jaimes . So [facility] [phone].    ATENCIÓN: Si habla español, tiene a chaney disposición servicios gratuitos de asistencia lingüística. Llame al [phone].    We comply with applicable federal civil rights laws and [place] laws. We do not discriminate on the basis of race, color, national origin, age, disability, sex, sexual orientation, or gender identity.            Thank you!     Thank you for choosing [facility]  for your care. Our goal is always to provide you with excellent care. Hearing back from our patients is one way we can continue to improve our services. Please take a few minutes to complete the written survey that you may receive in the mail after your visit with us. Thank you!             Your Updated Medication List - Protect others around you: Learn how to safely use, store and throw away your medicines at www.disposemymeds.org.          This list is accurate as of 8/28/18 10:28 AM.  Always use your most recent med list.                   Brand Name Dispense Instructions for use Diagnosis    albuterol (2.5 MG/3ML) 0.083% neb solution           amLODIPine 5 MG tablet    NORVASC    90 tablet     Take 1 tablet (5 mg) by mouth daily    Essential hypertension, benign       aspirin 325 MG EC tablet     100    1 tab po QD (Once per day)    Coronary atherosclerosis of unspecified type of vessel, native or graft       atenolol 50 MG tablet    TENORMIN    90 tablet    Take 1 tablet (50 mg) by mouth daily    Essential hypertension, benign, Atherosclerosis of native coronary artery of native heart without angina pectoris       metoprolol succinate 100 MG 24 hr tablet    TOPROL-XL    90 tablet    Take 1 tablet (100 mg) by mouth daily    Atherosclerosis of native coronary artery of native heart without angina pectoris, Essential hypertension, benign       order for DME     1 Device    Equipment being ordered: Oxygen and overnight oxymetry    Pulmonary emphysema, unspecified emphysema type (H)       simvastatin 20 MG tablet    ZOCOR    90 tablet    Take 1 tablet (20 mg) by mouth At Bedtime    Mixed hyperlipidemia, Atherosclerosis of native coronary artery of native heart without angina pectoris       SYMBICORT 80-4.5 MCG/ACT Inhaler   Generic drug:  budesonide-formoterol     3 Inhaler    Inhale 2 puffs into the lungs 2 times daily    Chronic obstructive pulmonary disease, unspecified COPD type (H)       tiotropium 2.5 MCG/ACT inhalation aerosol    SPIRIVA RESPIMAT    4 g    Inhale 2 puffs into the lungs daily    Chronic obstructive pulmonary disease, unspecified COPD type (H)

## 2018-08-29 LAB
ALBUMIN SERPL-MCNC: 4.3 G/DL (ref 3.6–5.1)
ALBUMIN/GLOB SERPL: 1.8 (CALC) (ref 1–2.5)
ALP SERPL-CCNC: 73 U/L (ref 40–115)
ALT SERPL-CCNC: 12 U/L (ref 9–46)
AST SERPL-CCNC: 19 U/L (ref 10–35)
BILIRUB SERPL-MCNC: 1.3 MG/DL (ref 0.2–1.2)
BUN SERPL-MCNC: 11 MG/DL (ref 7–25)
BUN/CREATININE RATIO: 17 (CALC) (ref 6–22)
CALCIUM SERPL-MCNC: 9.4 MG/DL (ref 8.6–10.3)
CHLORIDE SERPLBLD-SCNC: 97 MMOL/L (ref 98–110)
CHOLEST SERPL-MCNC: 165 MG/DL
CHOLEST/HDLC SERPL: 1.9 (CALC)
CO2 SERPL-SCNC: 33 MMOL/L (ref 20–32)
CREAT SERPL-MCNC: 0.63 MG/DL (ref 0.7–1.18)
EGFR AFRICAN AMERICAN - QUEST: 112 ML/MIN/1.73M2
GFR SERPL CREATININE-BSD FRML MDRD: 96 ML/MIN/1.73M2
GLOBULIN, CALCULATED - QUEST: 2.4 G/DL (CALC) (ref 1.9–3.7)
GLUCOSE - QUEST: 103 MG/DL (ref 65–99)
HDLC SERPL-MCNC: 88 MG/DL
LDLC SERPL CALC-MCNC: 64 MG/DL (CALC)
NONHDLC SERPL-MCNC: 77 MG/DL (CALC)
POTASSIUM SERPL-SCNC: 4.8 MMOL/L (ref 3.5–5.3)
PROT SERPL-MCNC: 6.7 G/DL (ref 6.1–8.1)
SODIUM SERPL-SCNC: 139 MMOL/L (ref 135–146)
TRIGL SERPL-MCNC: 44 MG/DL

## 2019-03-14 ENCOUNTER — TELEPHONE (OUTPATIENT)
Dept: FAMILY MEDICINE | Facility: CLINIC | Age: 74
End: 2019-03-14

## 2019-03-14 ENCOUNTER — OFFICE VISIT (OUTPATIENT)
Dept: FAMILY MEDICINE | Facility: CLINIC | Age: 74
End: 2019-03-14

## 2019-03-14 VITALS
OXYGEN SATURATION: 97 % | BODY MASS INDEX: 24.48 KG/M2 | DIASTOLIC BLOOD PRESSURE: 64 MMHG | SYSTOLIC BLOOD PRESSURE: 136 MMHG | WEIGHT: 159.8 LBS | TEMPERATURE: 98.7 F | HEART RATE: 75 BPM

## 2019-03-14 DIAGNOSIS — I25.10 ATHEROSCLEROSIS OF NATIVE CORONARY ARTERY OF NATIVE HEART WITHOUT ANGINA PECTORIS: ICD-10-CM

## 2019-03-14 DIAGNOSIS — I10 ESSENTIAL HYPERTENSION, BENIGN: ICD-10-CM

## 2019-03-14 DIAGNOSIS — E78.2 MIXED HYPERLIPIDEMIA: Primary | ICD-10-CM

## 2019-03-14 DIAGNOSIS — J43.9 PULMONARY EMPHYSEMA, UNSPECIFIED EMPHYSEMA TYPE (H): ICD-10-CM

## 2019-03-14 PROCEDURE — 99214 OFFICE O/P EST MOD 30 MIN: CPT | Performed by: FAMILY MEDICINE

## 2019-03-14 PROCEDURE — 80053 COMPREHEN METABOLIC PANEL: CPT | Mod: 90 | Performed by: FAMILY MEDICINE

## 2019-03-14 PROCEDURE — 80061 LIPID PANEL: CPT | Mod: 90 | Performed by: FAMILY MEDICINE

## 2019-03-14 PROCEDURE — 36415 COLL VENOUS BLD VENIPUNCTURE: CPT | Performed by: FAMILY MEDICINE

## 2019-03-14 NOTE — TELEPHONE ENCOUNTER
Usually we do 6 months for his type of medical conditions but if he is stable after labs we can consider 1 yr

## 2019-03-14 NOTE — LETTER
March 15, 2019      Roberto Carlos Still  [address] [facility] [address]        Dear ,    We are writing to inform you of your test results.    Your test results fall within the expected range(s) or remain unchanged from previous results.  Please continue with current treatment plan.    Resulted Orders   COMPREHENSIVE METABOLIC PANEL (QUEST) XCMP   Result Value Ref Range    Glucose 104 (H) 65 - 99 mg/dL      Comment:                    Fasting reference interval     For someone without known diabetes, a glucose value  between 100 and 125 mg/dL is consistent with  prediabetes and should be confirmed with a  follow-up test.         Urea Nitrogen 11 7 - 25 mg/dL    Creatinine 0.70 0.70 - 1.18 mg/dL      Comment:      For patients >49 years of age, the reference limit  for Creatinine is approximately 13% higher for people  identified as -American.         GFR Estimate 92 > OR = 60 mL/min/1.73m2    EGFR African American 106 > OR = 60 mL/min/1.73m2    BUN/Creatinine Ratio NOT APPLICABLE 6 - 22 (calc)    Sodium 141 135 - 146 mmol/L    Potassium 4.4 3.5 - 5.3 mmol/L    Chloride 99 98 - 110 mmol/L    Carbon Dioxide 33 (H) 20 - 32 mmol/L    Calcium 9.5 8.6 - 10.3 mg/dL    Protein Total 6.7 6.1 - 8.1 g/dL    Albumin 4.2 3.6 - 5.1 g/dL    Globulin Calculated 2.5 1.9 - 3.7 g/dL (calc)    A/G Ratio 1.7 1.0 - 2.5 (calc)    Bilirubin Total 1.3 (H) 0.2 - 1.2 mg/dL    Alkaline Phosphatase 70 40 - 115 U/L    AST 19 10 - 35 U/L    ALT 10 9 - 46 U/L   Lipid Profile (QUEST)   Result Value Ref Range    Cholesterol 175 <200 mg/dL    HDL Cholesterol 80 >40 mg/dL    Triglycerides 46 <150 mg/dL    LDL Cholesterol Calculated 82 mg/dL (calc)      Comment:      Reference range: <100     Desirable range <100 mg/dL for primary prevention;    <70 mg/dL for patients with CHD or diabetic patients   with > or = 2 CHD risk factors.     LDL-C is now calculated using the Satya-Osei   calculation, which is a validated  novel method providing   better accuracy than the Friedewald equation in the   estimation of LDL-C.   Satya SS et al. LAI. 2013;310(19): 4549-2080   (http://education.ZOCKO.[employer]/faq/IYP700)      Cholesterol/HDL Ratio 2.2 <5.0 (calc)    Non HDL Cholesterol 95 <130 mg/dL (calc)      Comment:      For patients with diabetes plus 1 major ASCVD risk   factor, treating to a non-HDL-C goal of <100 mg/dL   (LDL-C of <70 mg/dL) is considered a therapeutic   option.         If you have any questions or concerns, please call the clinic at the number listed above.       Sincerely,        Leonard Palacios MD

## 2019-03-14 NOTE — TELEPHONE ENCOUNTER
I informed Cholo. Once his labs come back do you want me to call him and tell him a final answer on that?

## 2019-03-14 NOTE — NURSING NOTE
Cholo is here for fasting med check    Pre-visit Screening:  Immunizations:  up to date  Colonoscopy:  NA d/t age  Mammogram: NA  Asthma Action Test/Plan:  NA  PHQ9:  None  GAD7:  None  Questioned patient about current smoking habits Pt. quit smoking some time ago.  Ok to leave detailed message on voice mail for today's visit only Yes, phone # [phone]

## 2019-03-14 NOTE — PROGRESS NOTES
SUBJECTIVE:                                                    Roberto Carlos Still is a 74 year old male who presents to clinic today for the following health issues:      Hyperlipidemia Follow-Up      Rate your low fat/cholesterol diet?: good    Taking statin?  Yes, no muscle aches from statin    Other lipid medications/supplements?:  none    Hypertension Follow-up      Outpatient blood pressures are not being checked.    Low Salt Diet: no added salt    Vascular Disease Follow-up:  Coronary Artery Disease (CAD)-stent 97, not seeing cardiology      Chest pain or pressure, left side neck or arm pain: No    Shortness of breath/increased sweats/nausea with exertion: No    Pain in calves walking 1-2 blocks: No    Worsened or new symptoms since last visit: No    Nitroglycerin use: no    Daily aspirin use: Yes    COPD Ewjkvx-Qa-IQ Hovda    Symptoms are currently: stable    Current fatigue or dyspnea with ambulation: none    Shortness of breath: stable    Increased or change in Cough/Sputum: No    Fever(s): No        Any ER/UC or hospital admissions since your last visit? No     History   Smoking Status     Former Smoker     Packs/day: 0.50     Years: 35.00   Smokeless Tobacco     Never Used     No results found for: FEV1, LQS3MQG    Amount of exercise or physical activity: 6-7 days/week for an average of less than 15 minutes    Problems taking medications regularly: No    Medication side effects: none    Diet: regular (no restrictions)            Problem list and histories reviewed & adjusted, as indicated.  Additional history: as documented    Patient Active Problem List   Diagnosis     Coronary atherosclerosis     Essential hypertension, benign     ACP (advance care planning)     Health Care Home     COPD (chronic obstructive pulmonary disease) (H)     Mixed hyperlipidemia     Past Surgical History:   Procedure Laterality Date     C APPENDECTOMY       C REPR ASD W BYPASS  5/97    One vessel angioplasty and stint     EYE  EXAM ESTABLISHED PT  12/06     HC COLONOSCOPY THRU STOMA, DIAGNOSTIC  6/05     HCL PSA, DIAGNOSTIC (TUMOR MARKER)  3/06    normal     REMOVAL OF SPERM DUCT(S)      Vasectomy       Social History     Tobacco Use     Smoking status: Former Smoker     Packs/day: 0.50     Years: 35.00     Pack years: 17.50     Smokeless tobacco: Never Used   Substance Use Topics     Alcohol use: Yes     Alcohol/week: 7.0 oz     Family History   Problem Relation Age of Onset     Alzheimer Disease Mother      Heart Disease Father      Cancer No family hx of      Diabetes No family hx of          Current Outpatient Medications   Medication Sig Dispense Refill     albuterol (2.5 MG/3ML) 0.083% nebulizer solution   0     amLODIPine (NORVASC) 5 MG tablet Take 1 tablet (5 mg) by mouth daily 90 tablet 1     ASPIRIN 325 MG OR TBEC 1 tab po QD (Once per day) 100 3     atenolol (TENORMIN) 50 MG tablet Take 1 tablet (50 mg) by mouth daily 90 tablet 1     budesonide-formoterol (SYMBICORT) 80-4.5 MCG/ACT Inhaler Inhale 2 puffs into the lungs 2 times daily 3 Inhaler 1     order for DME Equipment being ordered: Oxygen and overnight oxymetry 1 Device 0     simvastatin (ZOCOR) 20 MG tablet Take 1 tablet (20 mg) by mouth At Bedtime 90 tablet 1     tiotropium (SPIRIVA RESPIMAT) 2.5 MCG/ACT inhalation aerosol Inhale 2 puffs into the lungs daily 4 g 1     Allergies   Allergen Reactions     No Known Allergies      Recent Labs   Lab Test 08/28/18  1040 02/27/18  1020 08/24/17  1104   LDL 64 74 76   HDL 88 71 78   TRIG 44 44 44   ALT 12 9 14   CR 0.63* 0.61* 0.68*   GFRESTIMATED 96 98 94   POTASSIUM 4.8 5.0 4.7      BP Readings from Last 3 Encounters:   03/14/19 136/64   08/28/18 122/74   02/27/18 132/76    Wt Readings from Last 3 Encounters:   03/14/19 72.5 kg (159 lb 12.8 oz)   08/28/18 71.9 kg (158 lb 9.6 oz)   02/27/18 72.4 kg (159 lb 9.6 oz)                    ROS:  Constitutional, HEENT, cardiovascular, pulmonary, gi and gu systems are negative, except as  "otherwise noted.    OBJECTIVE:     /64 (BP Location: Right arm, Patient Position: Sitting, Cuff Size: Adult Large)   Pulse 75   Temp 98.7  F (37.1  C) (Oral)   Wt 72.5 kg (159 lb 12.8 oz)   SpO2 97%   BMI 24.48 kg/m    Body mass index is 24.48 kg/m .   GENERAL: healthy, alert and no distress  EYES: Eyes grossly normal to inspection, PERRL and conjunctivae and sclerae normal  HENT: ear canals and TM's normal, nose and mouth without ulcers or lesions  NECK: no adenopathy, no asymmetry, masses, or scars and thyroid normal to palpation  RESP: lungs clear to auscultation - no rales, rhonchi or wheezes  CV: regular rate and rhythm, normal S1 S2, no S3 or S4, no murmur, click or rub, no peripheral edema and peripheral pulses strong  ABDOMEN: soft, nontender, no hepatosplenomegaly, no masses and bowel sounds normal  MS: no gross musculoskeletal defects noted, no edema  NEURO: Normal strength and tone, mentation intact and speech normal  PSYCH: mentation appears normal, affect normal/bright        ASSESSMENT:       PLAN:   (E78.2) Mixed hyperlipidemia  (primary encounter diagnosis)  Comment: control uncertain  Plan: Lipid Profile (QUEST), VENOUS COLLECTION        continue current medications at current doses pending labs    (I10) Essential hypertension, benign  Comment: well controlled  Plan: COMPREHENSIVE METABOLIC PANEL (QUEST) XCMP        continue current medications at current doses     (J43.9) Pulmonary emphysema, unspecified emphysema type (H)  Comment: stable  Plan: continue current medications at current doses     (I25.10) Atherosclerosis of native coronary artery of native heart without angina pectoris  Comment: stable symptomatically   Plan: continue current medications at current doses , would consider seeing cards at some point    BMI:   Estimated body mass index is 24.48 kg/m  as calculated from the following:    Height as of 2/27/18: 1.721 m (5' 7.75\").    Weight as of this encounter: 72.5 kg (159 lb " 12.8 oz).   Weight management plan: Discussed healthy diet and exercise guidelines      FUTURE APPOINTMENTS:       - Follow-up visit in 6 mo  Work on weight loss  Regular exercise    Leonard Palacios MD  [facility]

## 2019-03-14 NOTE — TELEPHONE ENCOUNTER
Cholo called and he forgot to ask you when you want him to come back for an OV? I read your note for future appointments which says re-check in 6 months, but he thought it was 1 year because that is the schedule he is on with his lung doctor. Please advise.    Thanks.      Cholo's # (OK to leave detailed VM): [phone]

## 2019-03-15 LAB
ALBUMIN SERPL-MCNC: 4.2 G/DL (ref 3.6–5.1)
ALBUMIN/GLOB SERPL: 1.7 (CALC) (ref 1–2.5)
ALP SERPL-CCNC: 70 U/L (ref 40–115)
ALT SERPL-CCNC: 10 U/L (ref 9–46)
AST SERPL-CCNC: 19 U/L (ref 10–35)
BILIRUB SERPL-MCNC: 1.3 MG/DL (ref 0.2–1.2)
BUN SERPL-MCNC: 11 MG/DL (ref 7–25)
BUN/CREATININE RATIO: ABNORMAL (CALC) (ref 6–22)
CALCIUM SERPL-MCNC: 9.5 MG/DL (ref 8.6–10.3)
CHLORIDE SERPLBLD-SCNC: 99 MMOL/L (ref 98–110)
CHOLEST SERPL-MCNC: 175 MG/DL
CHOLEST/HDLC SERPL: 2.2 (CALC)
CO2 SERPL-SCNC: 33 MMOL/L (ref 20–32)
CREAT SERPL-MCNC: 0.7 MG/DL (ref 0.7–1.18)
EGFR AFRICAN AMERICAN - QUEST: 106 ML/MIN/1.73M2
GFR SERPL CREATININE-BSD FRML MDRD: 92 ML/MIN/1.73M2
GLOBULIN, CALCULATED - QUEST: 2.5 G/DL (CALC) (ref 1.9–3.7)
GLUCOSE - QUEST: 104 MG/DL (ref 65–99)
HDLC SERPL-MCNC: 80 MG/DL
LDLC SERPL CALC-MCNC: 82 MG/DL (CALC)
NONHDLC SERPL-MCNC: 95 MG/DL (CALC)
POTASSIUM SERPL-SCNC: 4.4 MMOL/L (ref 3.5–5.3)
PROT SERPL-MCNC: 6.7 G/DL (ref 6.1–8.1)
SODIUM SERPL-SCNC: 141 MMOL/L (ref 135–146)
TRIGL SERPL-MCNC: 46 MG/DL

## 2019-05-23 DIAGNOSIS — E78.2 MIXED HYPERLIPIDEMIA: ICD-10-CM

## 2019-05-23 DIAGNOSIS — I25.10 ATHEROSCLEROSIS OF NATIVE CORONARY ARTERY OF NATIVE HEART WITHOUT ANGINA PECTORIS: ICD-10-CM

## 2019-05-23 DIAGNOSIS — I10 ESSENTIAL HYPERTENSION, BENIGN: ICD-10-CM

## 2019-05-23 RX ORDER — AMLODIPINE BESYLATE 5 MG/1
5 TABLET ORAL DAILY
Qty: 90 TABLET | Refills: 0 | Status: SHIPPED | OUTPATIENT
Start: 2019-05-23 | End: 2019-08-19

## 2019-05-23 RX ORDER — SIMVASTATIN 20 MG
20 TABLET ORAL AT BEDTIME
Qty: 90 TABLET | Refills: 0 | Status: SHIPPED | OUTPATIENT
Start: 2019-05-23 | End: 2019-08-19

## 2019-05-23 RX ORDER — SIMVASTATIN 20 MG
TABLET ORAL
Qty: 90 TABLET | Refills: 1 | OUTPATIENT
Start: 2019-05-23

## 2019-05-23 RX ORDER — ATENOLOL 50 MG/1
50 TABLET ORAL DAILY
Qty: 90 TABLET | Refills: 0 | Status: SHIPPED | OUTPATIENT
Start: 2019-05-23 | End: 2019-08-19

## 2019-05-23 RX ORDER — AMLODIPINE BESYLATE 5 MG/1
TABLET ORAL
Qty: 90 TABLET | Refills: 1 | OUTPATIENT
Start: 2019-05-23

## 2019-05-23 RX ORDER — ATENOLOL 50 MG/1
TABLET ORAL
Qty: 90 TABLET | Refills: 1 | OUTPATIENT
Start: 2019-05-23

## 2019-05-23 NOTE — TELEPHONE ENCOUNTER
Pending Prescriptions:                       Disp   Refills    amLODIPine (NORVASC) 5 MG tablet          90 tab*0            Sig: Take 1 tablet (5 mg) by mouth daily    simvastatin (ZOCOR) 20 MG tablet          90 tab*0            Sig: Take 1 tablet (20 mg) by mouth At Bedtime    atenolol (TENORMIN) 50 MG tablet          90 tab*0            Sig: Take 1 tablet (50 mg) by mouth daily    J[facility] please review notes below    Talked to pt  Pt  here was just here in March rtc in six months   I was looking at refill from 8-2018  Blood work done 3-  Please fax and close encounter  Joselyn  [phone] (home)

## 2019-05-23 NOTE — TELEPHONE ENCOUNTER
Refused Prescriptions:                       Disp   Refills    atenolol (TENORMIN) 50 MG tablet [Pharmacy*90 tab*1        Sig: Take 1 tablet by mouth once daily.  Refused By: JOSELYN WOODRUFF  Reason for Refusal: Patient needs appointment    amLODIPine (NORVASC) 5 MG tablet [Pharmacy*90 tab*1        Sig: Take 1 tablet by mouth once daily.  Refused By: JOSELYN WOODRUFF  Reason for Refusal: Patient needs appointment    simvastatin (ZOCOR) 20 MG tablet [Pharmacy*90 tab*1        Sig: Take 1 tablet by mouth at bedtime.  Refused By: JOSELYN WOODRUFF  Reason for Refusal: Patient needs appointment    This was mail order denied   Pt is due for a fasting ov.  Pt should of been out of meds  Last refill and ov was 8-219 rtc in six months Feb 2019  Joselyn  [phone] (home)

## 2019-05-23 NOTE — TELEPHONE ENCOUNTER
Pending Prescriptions:                       Disp   Refills    amLODIPine (NORVASC) 5 MG tablet          30 tab*0            Sig: Take 1 tablet (5 mg) by mouth daily    simvastatin (ZOCOR) 20 MG tablet          30 tab*0            Sig: Take 1 tablet (20 mg) by mouth At Bedtime    atenolol (TENORMIN) 50 MG tablet          30 tab*0            Sig: Take 1 tablet (50 mg) by mouth daily      LMOM to see if he had a local pharmacy, Thought this was a mail order pharmacy  Will send 30 day refill to provider       Pt is due for a fasting ov.  Pt should of been out of meds  Last refill and ov was 8-219 rtc in six months Feb 2019  No future appts   Joselyn  [phone] ([place])

## 2019-05-28 ENCOUNTER — TELEPHONE (OUTPATIENT)
Dept: FAMILY MEDICINE | Facility: CLINIC | Age: 74
End: 2019-05-28

## 2019-05-28 NOTE — TELEPHONE ENCOUNTER
Patient called in and stated that he was told by Dr. Palacios that he does not need to come in every 6 months anymore and is ok to come in for a yearly visit based off of his lab results and in conjunction with [facility]. He only got 1 90 day supply with no refills and is wondering what is going on with it and if there was a mistake. He does need the refill right now but can call us when he is out of the 90 day supply and ask for the remainder of the refills if Dr. Palacios is ok with him coming in once a year. Routing to Dr. Palacios for review, based off of your note from March 2019, it states the patient is due to come back in 6 months. Is it ok if he does yearly now? Please advise.    Patient call back number is [phone]

## 2019-05-29 NOTE — TELEPHONE ENCOUNTER
Patient expressed understanding to this and stated that he will get in contact with pulmonary to see when they would like him to come back and see them but he was just seen there in March of this year. He will make sure that he is seen somewhere every 6 months and will call us when he is out of his 90 day supply so that we can send in refills. Routing back to Dr. Palacios for review.

## 2019-05-29 NOTE — TELEPHONE ENCOUNTER
I am OK with him seeing me yearly if he is seeing pulmonary on the alternating 6 month interval so someone lays eyes on him every 6 months.  From what I can see he has not seen pulmonary since 3/18.  Please clarify if he is seeing pulmonary soon as it has been over a year. I would recommend he see pulmonary in next few months and then me again next year  Let me know as I can certainly get him some refillas

## 2019-08-08 ENCOUNTER — TELEPHONE (OUTPATIENT)
Dept: FAMILY MEDICINE | Facility: CLINIC | Age: 74
End: 2019-08-08

## 2019-08-13 ENCOUNTER — ALLIED HEALTH/NURSE VISIT (OUTPATIENT)
Dept: FAMILY MEDICINE | Facility: CLINIC | Age: 74
End: 2019-08-13

## 2019-08-13 DIAGNOSIS — Z23 NEED FOR VACCINATION: Primary | ICD-10-CM

## 2019-08-13 PROCEDURE — 90750 HZV VACC RECOMBINANT IM: CPT | Performed by: FAMILY MEDICINE

## 2019-08-13 PROCEDURE — 90471 IMMUNIZATION ADMIN: CPT | Performed by: FAMILY MEDICINE

## 2019-08-19 ENCOUNTER — TELEPHONE (OUTPATIENT)
Dept: FAMILY MEDICINE | Facility: CLINIC | Age: 74
End: 2019-08-19

## 2019-08-19 DIAGNOSIS — E78.2 MIXED HYPERLIPIDEMIA: ICD-10-CM

## 2019-08-19 DIAGNOSIS — I25.10 ATHEROSCLEROSIS OF NATIVE CORONARY ARTERY OF NATIVE HEART WITHOUT ANGINA PECTORIS: ICD-10-CM

## 2019-08-19 DIAGNOSIS — I10 ESSENTIAL HYPERTENSION, BENIGN: ICD-10-CM

## 2019-08-19 RX ORDER — AMLODIPINE BESYLATE 5 MG/1
TABLET ORAL
Qty: 30 TABLET | Refills: 0 | Status: SHIPPED | OUTPATIENT
Start: 2019-08-19 | End: 2019-08-29

## 2019-08-19 RX ORDER — SIMVASTATIN 20 MG
TABLET ORAL
Qty: 90 TABLET | Refills: 0 | Status: SHIPPED | OUTPATIENT
Start: 2019-08-19 | End: 2019-08-22

## 2019-08-19 RX ORDER — ATENOLOL 50 MG/1
TABLET ORAL
Qty: 90 TABLET | Refills: 0 | Status: SHIPPED | OUTPATIENT
Start: 2019-08-19 | End: 2019-08-22

## 2019-08-19 NOTE — TELEPHONE ENCOUNTER
Pt called back stating that he only has to come in yearly for fasting labs. He wants to know know what changed?

## 2019-08-19 NOTE — TELEPHONE ENCOUNTER
Given his conditions and medications we would always recommend every 6 mo visits-in looking back he has been in at 6 month intervals for last 4 at least

## 2019-08-19 NOTE — TELEPHONE ENCOUNTER
Pending Prescriptions:                       Disp   Refills    atenolol (TENORMIN) 50 MG tablet [Pharmac*90 tab*0            Sig: Take 1 tablet by mouth once daily.    simvastatin (ZOCOR) 20 MG tablet [Pharmac*90 tab*0            Sig: Take 1 tablet by mouth at bedtime.    amLODIPine (NORVASC) 5 MG tablet [Pharmac*30 tab*0            Sig: Take 1 tablet by mouth once daily.    Pt due for a fasting ov  Please fax 30 and send to [facility]  [phone] (home)

## 2019-08-22 DIAGNOSIS — I10 ESSENTIAL HYPERTENSION, BENIGN: ICD-10-CM

## 2019-08-22 DIAGNOSIS — E78.2 MIXED HYPERLIPIDEMIA: ICD-10-CM

## 2019-08-22 DIAGNOSIS — I25.10 ATHEROSCLEROSIS OF NATIVE CORONARY ARTERY OF NATIVE HEART WITHOUT ANGINA PECTORIS: ICD-10-CM

## 2019-08-22 RX ORDER — ATENOLOL 50 MG/1
50 TABLET ORAL DAILY
Qty: 30 TABLET | Refills: 0 | Status: SHIPPED | OUTPATIENT
Start: 2019-08-22 | End: 2019-08-29

## 2019-08-22 RX ORDER — SIMVASTATIN 20 MG
20 TABLET ORAL AT BEDTIME
Qty: 30 TABLET | Refills: 0 | Status: SHIPPED | OUTPATIENT
Start: 2019-08-22 | End: 2019-08-29

## 2019-08-22 NOTE — TELEPHONE ENCOUNTER
Can you resend these prescriptions. Transmission didn't got through.    Roberto Carlos Still is requesting a refill of:    Pending Prescriptions:                       Disp   Refills    simvastatin (ZOCOR) 20 MG tablet          30 tab*0            Sig: Take 1 tablet (20 mg) by mouth At Bedtime    atenolol (TENORMIN) 50 MG tablet          30 tab*0            Sig: Take 1 tablet (50 mg) by mouth daily

## 2019-08-25 DIAGNOSIS — I25.10 ATHEROSCLEROSIS OF NATIVE CORONARY ARTERY OF NATIVE HEART WITHOUT ANGINA PECTORIS: ICD-10-CM

## 2019-08-25 DIAGNOSIS — I10 ESSENTIAL HYPERTENSION, BENIGN: ICD-10-CM

## 2019-08-25 DIAGNOSIS — E78.2 MIXED HYPERLIPIDEMIA: ICD-10-CM

## 2019-08-26 RX ORDER — SIMVASTATIN 20 MG
TABLET ORAL
Qty: 90 TABLET | Refills: 0 | COMMUNITY
Start: 2019-08-26

## 2019-08-26 RX ORDER — ATENOLOL 50 MG/1
TABLET ORAL
Qty: 90 TABLET | Refills: 0 | COMMUNITY
Start: 2019-08-26

## 2019-08-26 NOTE — TELEPHONE ENCOUNTER
Roberto Carlos Still is requesting a refill of:    Refused Prescriptions:                       Disp   Refills    atenolol (TENORMIN) 50 MG tablet [Pharmacy*90 tab*0        Sig: Take 1 tablet by mouth once daily.  Refused By: REYES WASHBURN  Reason for Refusal: Patient needs appointment    simvastatin (ZOCOR) 20 MG tablet [Pharmacy*90 tab*0        Sig: Take 1 tablet by mouth at bedtime.  Refused By: REYES WASHBURN  Reason for Refusal: Patient needs appointment

## 2019-08-29 ENCOUNTER — OFFICE VISIT (OUTPATIENT)
Dept: FAMILY MEDICINE | Facility: CLINIC | Age: 74
End: 2019-08-29

## 2019-08-29 VITALS
DIASTOLIC BLOOD PRESSURE: 70 MMHG | BODY MASS INDEX: 23.56 KG/M2 | HEART RATE: 89 BPM | TEMPERATURE: 98.6 F | SYSTOLIC BLOOD PRESSURE: 138 MMHG | WEIGHT: 153.8 LBS | OXYGEN SATURATION: 90 %

## 2019-08-29 DIAGNOSIS — E78.2 MIXED HYPERLIPIDEMIA: ICD-10-CM

## 2019-08-29 DIAGNOSIS — J43.9 PULMONARY EMPHYSEMA, UNSPECIFIED EMPHYSEMA TYPE (H): ICD-10-CM

## 2019-08-29 DIAGNOSIS — I10 ESSENTIAL HYPERTENSION, BENIGN: Primary | ICD-10-CM

## 2019-08-29 DIAGNOSIS — I25.10 ATHEROSCLEROSIS OF NATIVE CORONARY ARTERY OF NATIVE HEART WITHOUT ANGINA PECTORIS: ICD-10-CM

## 2019-08-29 DIAGNOSIS — Z12.5 SPECIAL SCREENING FOR MALIGNANT NEOPLASM OF PROSTATE: ICD-10-CM

## 2019-08-29 LAB
ALBUMIN SERPL-MCNC: 4.6 G/DL (ref 3.6–5.1)
ALBUMIN/GLOB SERPL: 1.8 {RATIO} (ref 1–2.5)
ALP SERPL-CCNC: 75 U/L (ref 33–130)
ALT 1742-6: 8 U/L (ref 5–30)
AST 1920-8: 17 U/L (ref 7–31)
BILIRUB SERPL-MCNC: 1.3 MG/DL (ref 0.2–1.2)
BUN SERPL-MCNC: 12 MG/DL (ref 7–25)
BUN/CREATININE RATIO: 14.3 (ref 6–22)
CALCIUM SERPL-MCNC: 10 MG/DL (ref 8.6–10.3)
CHLORIDE SERPLBLD-SCNC: 103.2 MMOL/L (ref 98–110)
CHOLEST SERPL-MCNC: 182 MG/DL (ref 0–199)
CHOLEST/HDLC SERPL: 2 {RATIO} (ref 0–5)
CO2 SERPL-SCNC: 35.6 MMOL/L (ref 20–32)
CREAT SERPL-MCNC: 0.84 MG/DL (ref 0.7–1.18)
GLOBULIN, CALCULATED - QUEST: 2.6 (ref 1.9–3.7)
GLUCOSE SERPL-MCNC: 110 MG/DL (ref 60–99)
HDLC SERPL-MCNC: 87 MG/DL (ref 40–150)
LDLC SERPL CALC-MCNC: 84 MG/DL (ref 0–130)
POTASSIUM SERPL-SCNC: 4.96 MMOL/L (ref 3.5–5.3)
PROT SERPL-MCNC: 7.2 G/DL (ref 6.1–8.1)
SODIUM SERPL-SCNC: 144.2 MMOL/L (ref 135–146)
TRIGL SERPL-MCNC: 55 MG/DL (ref 0–149)

## 2019-08-29 PROCEDURE — 36415 COLL VENOUS BLD VENIPUNCTURE: CPT | Performed by: FAMILY MEDICINE

## 2019-08-29 PROCEDURE — 80061 LIPID PANEL: CPT | Performed by: FAMILY MEDICINE

## 2019-08-29 PROCEDURE — 99214 OFFICE O/P EST MOD 30 MIN: CPT | Performed by: FAMILY MEDICINE

## 2019-08-29 PROCEDURE — 80053 COMPREHEN METABOLIC PANEL: CPT | Performed by: FAMILY MEDICINE

## 2019-08-29 PROCEDURE — 84153 ASSAY OF PSA TOTAL: CPT | Mod: 90 | Performed by: FAMILY MEDICINE

## 2019-08-29 RX ORDER — ATENOLOL 50 MG/1
50 TABLET ORAL DAILY
Qty: 90 TABLET | Refills: 3 | Status: SHIPPED | OUTPATIENT
Start: 2019-08-29 | End: 2020-09-10

## 2019-08-29 RX ORDER — SIMVASTATIN 20 MG
20 TABLET ORAL AT BEDTIME
Qty: 90 TABLET | Refills: 3 | Status: SHIPPED | OUTPATIENT
Start: 2019-08-29 | End: 2020-09-10

## 2019-08-29 RX ORDER — AMLODIPINE BESYLATE 5 MG/1
5 TABLET ORAL DAILY
Qty: 90 TABLET | Refills: 3 | Status: SHIPPED | OUTPATIENT
Start: 2019-08-29 | End: 2020-09-10

## 2019-08-29 NOTE — NURSING NOTE
Cholo is here today for a fasting med recheck.    Pre-visit Screening:  Immunizations:  up to date  Colonoscopy:  is up to date  Mammogram: NA  Asthma Action Test/Plan:  MAGDALENO  PHQ9:  NA  GAD7:  NA  Questioned patient about current smoking habits Pt. quit smoking some time ago.  Ok to leave detailed message on voice mail for today's visit only Yes, phone # [phone]

## 2019-08-29 NOTE — PROGRESS NOTES
Subjective     Roberto Carlos Still is a 74 year old male who presents to clinic today for the following health issues:    HPI   Hyperlipidemia Follow-Up      Are you having any of the following symptoms? (Select all that apply)  No complaints of shortness of breath, chest pain or pressure.  No increased sweating or nausea with activity.  No left-sided neck or arm pain.  No complaints of pain in calves when walking 1-2 blocks.    Are you regularly taking any medication or supplement to lower your cholesterol?   Yes- simvastatin    Are you having muscle aches or other side effects that you think could be caused by your cholesterol lowering medication?  No      Hypertension Follow-up      Do you check your blood pressure regularly outside of the clinic? No     Are you following a low salt diet? No    Are your blood pressures ever more than 140 on the top number (systolic) OR more   than 90 on the bottom number (diastolic), for example 140/90? No  Vascular Disease Follow-up      Are you having any of the following symptoms? (Select all that apply) No complaints of shortness of breath, chest pain or pressure.  No increased sweating or nausea with activity.  No left-sided neck or arm pain.  No complaints of pain in calves when walking 1-2 blocks.    How often do you take nitroglycerin? Never    Do you take an aspirin every day? Yes    COPD Follow-Up    Overall, how are your COPD symptoms since your last clinic visit?  No change    How much fatigue or shortness of breath do you have when you are walking?  Same as usual    How much shortness of breath do you have when you are resting?  Same as usual    How often do you cough? Never    Have you noticed any change in your sputum/phlegm?  No    Have you experienced a recent fever? No    Please describe how far you can walk without stopping to rest:  Less than 1 block    How many flights of stairs are you able to walk up without stopping?  2    Have you had any Emergency Room  Visits, Urgent Care Visits, or Hospital Admissions because of your COPD since your last office visit?  No    History   Smoking Status     Former Smoker     Packs/day: 0.50     Years: 35.00   Smokeless Tobacco     Never Used     No results found for: FEV1, TOS1MCO      How many servings of fruits and vegetables do you eat daily?  2-3    On average, how many sweetened beverages do you drink each day (soda, juice, sweet tea, etc)?   0    How many days per week do you miss taking your medication? 0            Patient Active Problem List   Diagnosis     Coronary atherosclerosis     Essential hypertension, benign     ACP (advance care planning)     Health Care Home     COPD (chronic obstructive pulmonary disease) (H)     Mixed hyperlipidemia     Past Surgical History:   Procedure Laterality Date     C APPENDECTOMY       C REPR ASD W BYPASS  5/97    One vessel angioplasty and stint     EYE EXAM ESTABLISHED PT  12/06     HC COLONOSCOPY THRU STOMA, DIAGNOSTIC  6/05     HCL PSA, DIAGNOSTIC (TUMOR MARKER)  3/06    normal     REMOVAL OF SPERM DUCT(S)      Vasectomy       Social History     Tobacco Use     Smoking status: Former Smoker     Packs/day: 0.50     Years: 35.00     Pack years: 17.50     Smokeless tobacco: Never Used   Substance Use Topics     Alcohol use: Yes     Alcohol/week: 7.0 oz     Family History   Problem Relation Age of Onset     Alzheimer Disease Mother      Heart Disease Father      Cancer No family hx of      Diabetes No family hx of          Current Outpatient Medications   Medication Sig Dispense Refill     amLODIPine (NORVASC) 5 MG tablet Take 1 tablet (5 mg) by mouth daily 90 tablet 3     ASPIRIN 325 MG OR TBEC 1 tab po QD (Once per day) 100 3     atenolol (TENORMIN) 50 MG tablet Take 1 tablet (50 mg) by mouth daily 90 tablet 3     order for DME Equipment being ordered: Oxygen and overnight oxymetry 1 Device 0     simvastatin (ZOCOR) 20 MG tablet Take 1 tablet (20 mg) by mouth At Bedtime 90 tablet 3      tiotropium (SPIRIVA RESPIMAT) 2.5 MCG/ACT inhalation aerosol Inhale 2 puffs into the lungs daily 4 g 1     umeclidinium (INCRUSE ELLIPTA) 62.5 MCG/INH inhaler        Allergies   Allergen Reactions     No Known Allergies      Recent Labs   Lab Test 03/14/19  0925 08/28/18  1040 02/27/18  1020   LDL 82 64 74   HDL 80 88 71   TRIG 46 44 44   ALT 10 12 9   CR 0.70 0.63* 0.61*   GFRESTIMATED 92 96 98   POTASSIUM 4.4 4.8 5.0      BP Readings from Last 3 Encounters:   08/29/19 138/70   03/14/19 136/64   08/28/18 122/74    Wt Readings from Last 3 Encounters:   08/29/19 69.8 kg (153 lb 12.8 oz)   03/14/19 72.5 kg (159 lb 12.8 oz)   08/28/18 71.9 kg (158 lb 9.6 oz)                      Reviewed and updated as needed this visit by Provider         Review of Systems   ROS COMP: Constitutional, HEENT, cardiovascular, pulmonary, gi and gu systems are negative, except as otherwise noted.      Objective    /70 (BP Location: Right arm, Patient Position: Sitting, Cuff Size: Adult Large)   Pulse 89   Temp 98.6  F (37  C) (Oral)   Wt 69.8 kg (153 lb 12.8 oz)   SpO2 90%   BMI 23.56 kg/m    Body mass index is 23.56 kg/m .  Physical Exam   GENERAL: healthy, alert and no distress  EYES: Eyes grossly normal to inspection, PERRL and conjunctivae and sclerae normal  HENT: ear canals and TM's normal, nose and mouth without ulcers or lesions  NECK: no adenopathy, no asymmetry, masses, or scars and thyroid normal to palpation  RESP: lungs clear to auscultation - no rales, rhonchi or wheezes  CV: regular rate and rhythm, normal S1 S2, no S3 or S4, no murmur, click or rub, no peripheral edema and peripheral pulses strong  ABDOMEN: soft, nontender, no hepatosplenomegaly, no masses and bowel sounds normal  MS: no gross musculoskeletal defects noted, no edema  SKIN: no suspicious lesions or rashes  NEURO: Normal strength and tone, mentation intact and speech normal  PSYCH: mentation appears normal, affect normal/bright    Diagnostic Test  Results:  Labs reviewed in Epic        Assessment & Plan   Assessment      Plan  (I10) Essential hypertension, benign  (primary encounter diagnosis)  Comment: well controlled  Plan: atenolol (TENORMIN) 50 MG tablet, amLODIPine         (NORVASC) 5 MG tablet, Comprehensive Metobolic         Panel ([facility]), VENOUS COLLECTION        continue current medications at current doses     (I25.10) Atherosclerosis of native coronary artery of native heart without angina pectoris  Comment: well controlled  Plan: atenolol (TENORMIN) 50 MG tablet, simvastatin         (ZOCOR) 20 MG tablet        continue current medications at current doses     Seeing cardiology in next month    (E78.2) Mixed hyperlipidemia  Comment: control uncertain  Plan: simvastatin (ZOCOR) 20 MG tablet, Lipid Panel         ([facility]), Comprehensive Metobolic Panel ([facility]),         VENOUS COLLECTION        continue current medications at current doses     (J43.9) Pulmonary emphysema, unspecified emphysema type (H)  Comment: stable, O2 continuous, sees Dr Whittington  Plan: continue current medications at current doses     (Z12.5) Special screening for malignant neoplasm of prostate  Comment:   Plan: VENOUS COLLECTION, HCL PSA, SCREENING ([facility])                FUTURE APPOINTMENTS:       - Follow-up visit in 1 yr  Regular exercise    No follow-ups on file.    Leonard Palacios MD  [facility]

## 2019-08-30 LAB — ABBOTT PSA - QUEST: 5.4 NG/ML

## 2019-09-07 ENCOUNTER — TRANSFERRED RECORDS (OUTPATIENT)
Dept: FAMILY MEDICINE | Facility: CLINIC | Age: 74
End: 2019-09-07

## 2019-09-09 ENCOUNTER — TELEPHONE (OUTPATIENT)
Dept: FAMILY MEDICINE | Facility: CLINIC | Age: 74
End: 2019-09-09

## 2019-09-09 DIAGNOSIS — R97.20 ELEVATED PROSTATE SPECIFIC ANTIGEN (PSA): Primary | ICD-10-CM

## 2019-09-09 NOTE — TELEPHONE ENCOUNTER
Patient called in and left a message for Dr. Palacios asking if he would call him back when he is able to because he has some questions about his results, especially with the PSA. Routing to Dr. Palacios for review.      Patient call back number is [phone]

## 2019-09-09 NOTE — TELEPHONE ENCOUNTER
Patient is calling Dr. Palacios back, he will keep phone by him to make sure he can answer for Dr. Palacios's call back.

## 2019-09-09 NOTE — TELEPHONE ENCOUNTER
D/w pt elevated psa-recommend either recheck 3 mo or see urology now-he prefers recheck in 3 mo, orders entered

## 2019-09-13 ENCOUNTER — OFFICE VISIT (OUTPATIENT)
Dept: FAMILY MEDICINE | Facility: CLINIC | Age: 74
End: 2019-09-13

## 2019-09-13 VITALS
BODY MASS INDEX: 23.59 KG/M2 | SYSTOLIC BLOOD PRESSURE: 134 MMHG | WEIGHT: 154 LBS | HEART RATE: 66 BPM | OXYGEN SATURATION: 98 % | TEMPERATURE: 98.1 F | DIASTOLIC BLOOD PRESSURE: 64 MMHG

## 2019-09-13 DIAGNOSIS — L02.419 CELLULITIS AND ABSCESS OF LEG: ICD-10-CM

## 2019-09-13 DIAGNOSIS — L03.119 CELLULITIS AND ABSCESS OF LEG: ICD-10-CM

## 2019-09-13 DIAGNOSIS — M79.605 PAIN OF LEFT LOWER EXTREMITY: Primary | ICD-10-CM

## 2019-09-13 PROCEDURE — 99213 OFFICE O/P EST LOW 20 MIN: CPT | Performed by: PHYSICIAN ASSISTANT

## 2019-09-13 RX ORDER — CEPHALEXIN 500 MG/1
1000 CAPSULE ORAL
COMMUNITY
Start: 2019-09-07 | End: 2019-09-13

## 2019-09-13 RX ORDER — CEPHALEXIN 500 MG/1
1000 CAPSULE ORAL 2 TIMES DAILY
Qty: 28 CAPSULE | Refills: 0 | Status: SHIPPED | OUTPATIENT
Start: 2019-09-13 | End: 2020-02-11

## 2019-09-13 NOTE — PROGRESS NOTES
"CC: ER follow-up    History:  Cholo is here today to follow-up from ER visit 9/7/2019. He had dropped a leaf blower off shelf 9/1/2019 that caused abrasion on left anterior leg and within days started noticing spreading redness. He was diagnosed with cellulitis of left lower extremity, and was started on 10 day course of keflex. Since that time, Cholo says things have been getting \"better and better\". He is tolerating the antibiotic without issue. Still no fever, sweats, chills, shortness of breath. Per patient, red area is getting much small, and less swollen. He also is happy that there is not pain any more.     PMH, MEDICATIONS, ALLERGIES, SOCIAL AND FAMILY HISTORY in EPIC and reviewed by me personally.    ROS negative other than the symptoms noted above in the HPI.      Examination   /64 (BP Location: Right arm, Patient Position: Sitting, Cuff Size: Adult Large)   Pulse 66   Temp 98.1  F (36.7  C) (Oral)   Wt 69.9 kg (154 lb)   SpO2 98%   BMI 23.59 kg/m       Constitutional: Sitting comfortably, in no acute distress. Vital signs noted  Neck:  no adenopathy, trachea midline and normal to palpation  Cardiovascular:  regular rate and rhythm, no murmurs, clicks, or gallops  Respiratory:  normal respiratory rate and rhythm, lungs clear to auscultation  M/S: ROM of left knee, ankle intact. Moderate 2+ pitted edema on distal left lower extremity, and proximal left foot. Mild fading erythema.   NEURO:  muscle strength normal, sensation to light touch and pinprick normal  SKIN: No jaundice/pallor/rash. Open wound approximately 7 cm by 3 cm with partial eschar formation. Granulomatous tissue noted.  Psychiatric: mentation appears normal and affect normal/bright      A/P    ICD-10-CM    1. Pain of left lower extremity M79.605 cephALEXin (KEFLEX) 500 MG capsule   2. Cellulitis and abscess of leg L03.119     L02.419        DISCUSSION:  Reassured by improving symptoms and improvement on exam. Explained to Cholo that " my biggest concern is the wound, as I feel the cellulitis is resolved. I advised wound clinic referral, but pt is resistant to do this as he sees significant improvement already. Agreed to monitor through the weekend, extend antibiotic by 7 days. I will call him next Mon/Tues to gauge improvement, and likely refer for wound management.     follow up visit: 4-5 days    Jazmín Russo PA-C  [facility]

## 2019-09-17 ENCOUNTER — TELEPHONE (OUTPATIENT)
Dept: FAMILY MEDICINE | Facility: CLINIC | Age: 74
End: 2019-09-17

## 2019-09-17 NOTE — TELEPHONE ENCOUNTER
Patient called in and left a message about a whole upper body rash (waistline and up) this morning at about 10am. He said it has not gotten any worse, not itchy, no pain, and no swelling.     Cephalexin was prescribed and that is what he started this morning when this happened. Is he reacting to this prescription? He said he finished the 10 day RX and it was from the same  as this medication.    Any recommendations for him? Routing to Jazmín.     Call Back #: [phone]

## 2019-09-17 NOTE — TELEPHONE ENCOUNTER
Called and spoke to Cholo. Rash is stable over the course of the day. No tongue/mouth swelling, no SOB/wheezing. Advised Allegra, and contact me tomorrow if not better. ER if worsening. I will contact him later this week for wound update.

## 2019-09-20 ENCOUNTER — TELEPHONE (OUTPATIENT)
Dept: FAMILY MEDICINE | Facility: CLINIC | Age: 74
End: 2019-09-20

## 2019-09-20 DIAGNOSIS — T14.8XXA OPEN WOUND: ICD-10-CM

## 2019-09-20 DIAGNOSIS — L03.116 CELLULITIS OF LEFT LOWER EXTREMITY: Primary | ICD-10-CM

## 2019-09-20 NOTE — TELEPHONE ENCOUNTER
Cholo called the lab to request that Jazmín put in a referral to [facility] regarding his wound.     [facility] # [phone] (This number is a fax number for [facility])

## 2019-09-20 NOTE — TELEPHONE ENCOUNTER
Called [facility], they do have wound clinic within their clinic- is a part of [facility] on [address]    Fax referral for wound clinic orders to: [phone]    Phone for reference: [phone]    Person at  will look for order to reach out to patient promptly.     Sending back to Jazmín to enter order, then I will fax to [facility]

## 2019-09-20 NOTE — TELEPHONE ENCOUNTER
Called Cholo and informed of referral, gave contact info for clinic, he stated they have already contacted him and he will set appointment on Monday.

## 2019-09-20 NOTE — TELEPHONE ENCOUNTER
Judy are out of office. Please contact number below to see where to fax referral for wound care. Thank you.

## 2019-09-20 NOTE — TELEPHONE ENCOUNTER
Completed. Please fax. Just looked at chart and pt was seen today at [facility] (maybe ER? Unclear), but please contact him to inform of this referral.

## 2019-09-21 DIAGNOSIS — I25.10 ATHEROSCLEROSIS OF NATIVE CORONARY ARTERY OF NATIVE HEART WITHOUT ANGINA PECTORIS: ICD-10-CM

## 2019-09-21 DIAGNOSIS — E78.2 MIXED HYPERLIPIDEMIA: ICD-10-CM

## 2019-09-23 RX ORDER — SIMVASTATIN 20 MG
TABLET ORAL
Qty: 90 TABLET | Refills: 0 | OUTPATIENT
Start: 2019-09-23

## 2019-09-23 NOTE — TELEPHONE ENCOUNTER
Refused Prescriptions:                       Disp   Refills    simvastatin (ZOCOR) 20 MG tablet [Pharmacy*90 tab*0        Sig: Take 1 tablet by mouth at bedtime.  Refused By: JOSELYN WOODRUFF  Reason for Refusal: Request already responded to by other means (phone, fax, etc.)  Reason for Refusal Comment: refilled 8- for one year    Joselyn  [phone] (home)

## 2019-10-16 ENCOUNTER — ALLIED HEALTH/NURSE VISIT (OUTPATIENT)
Dept: FAMILY MEDICINE | Facility: CLINIC | Age: 74
End: 2019-10-16

## 2019-10-16 DIAGNOSIS — Z23 NEED FOR VACCINATION: Primary | ICD-10-CM

## 2019-10-16 PROCEDURE — 90750 HZV VACC RECOMBINANT IM: CPT | Performed by: FAMILY MEDICINE

## 2019-10-16 PROCEDURE — 90471 IMMUNIZATION ADMIN: CPT | Performed by: FAMILY MEDICINE

## 2019-10-22 ENCOUNTER — TRANSFERRED RECORDS (OUTPATIENT)
Dept: FAMILY MEDICINE | Facility: CLINIC | Age: 74
End: 2019-10-22

## 2019-10-28 ENCOUNTER — TELEPHONE (OUTPATIENT)
Dept: FAMILY MEDICINE | Facility: CLINIC | Age: 74
End: 2019-10-28

## 2019-10-28 NOTE — TELEPHONE ENCOUNTER
Roberto Carlos Still called the clinic support line with the following:    States that he needs a letter for [employer] as he could not go to [place] yet due to his leg wound.  Will have him have the wound clinic do this as they are the ones doing the care.    He will call them, if they cannot, then I will see if Dr Palacios will.    He is fine with this plan

## 2019-10-30 ENCOUNTER — TRANSFERRED RECORDS (OUTPATIENT)
Dept: FAMILY MEDICINE | Facility: CLINIC | Age: 74
End: 2019-10-30

## 2019-11-06 ENCOUNTER — TRANSFERRED RECORDS (OUTPATIENT)
Dept: FAMILY MEDICINE | Facility: CLINIC | Age: 74
End: 2019-11-06

## 2019-11-13 ENCOUNTER — TRANSFERRED RECORDS (OUTPATIENT)
Dept: FAMILY MEDICINE | Facility: CLINIC | Age: 74
End: 2019-11-13

## 2019-11-25 ENCOUNTER — TRANSFERRED RECORDS (OUTPATIENT)
Dept: FAMILY MEDICINE | Facility: CLINIC | Age: 74
End: 2019-11-25

## 2019-12-14 ENCOUNTER — TELEPHONE (OUTPATIENT)
Dept: FAMILY MEDICINE | Facility: CLINIC | Age: 74
End: 2019-12-14

## 2019-12-14 NOTE — LETTER
[facility]  [address]  [place]  [address]    December 14, 2019            Roberto Carlos Still  [address] [address] [address]        Dear Roberto Carlos Still    It has come to our attention while reviewing your records, that you are in need of a PSA recheck.    Please call our lab at [phone] to schedule a lab only appointment.    If you have had this lab done at another facility, please call our office so we can update your records.    Thank you.        [facility]

## 2020-02-11 ENCOUNTER — OFFICE VISIT (OUTPATIENT)
Dept: FAMILY MEDICINE | Facility: CLINIC | Age: 75
End: 2020-02-11

## 2020-02-11 VITALS — BODY MASS INDEX: 23.74 KG/M2 | WEIGHT: 155 LBS | RESPIRATION RATE: 20 BRPM

## 2020-02-11 DIAGNOSIS — R97.20 ELEVATED PROSTATE SPECIFIC ANTIGEN (PSA): Primary | ICD-10-CM

## 2020-02-11 PROCEDURE — 36415 COLL VENOUS BLD VENIPUNCTURE: CPT | Performed by: FAMILY MEDICINE

## 2020-02-11 PROCEDURE — 84153 ASSAY OF PSA TOTAL: CPT | Mod: 90 | Performed by: FAMILY MEDICINE

## 2020-02-11 NOTE — LETTER
February 12, 2020      Roberto Carlos Still  [address] [address]        Dear ,    We are writing to inform you of your test results.    Your PSA is still elevated slightly but did decrease a bit from last check.  I am inclined to think this is not a worrisome situation but we have 2 options.  We can either  see you back and check PSA again in 6 months or send you to a urologist for other testing now.  You let me know what you prefer as both are good options.     Resulted Orders   HCL PSA, SCREENING ([facility])   Result Value Ref Range    Abbott PSA 5.2 (H) < OR = 4.0 ng/mL      Comment:      The total PSA value from this assay system is   standardized against the WHO standard. The test   result will be approximately 20% lower when compared   to the equimolar-standardized total PSA (Lina   Ty). Comparison of serial PSA results should be   interpreted with this fact in mind.     This test was performed using the Siemens   chemiluminescent method. Values obtained from   different assay methods cannot be used  interchangeably. PSA levels, regardless of  value, should not be interpreted as absolute  evidence of the presence or absence of disease.         If you have any questions or concerns, please call the clinic at the number listed above.       Sincerely,        Leonard Palacios MD

## 2020-02-12 LAB — ABBOTT PSA - QUEST: 5.2 NG/ML

## 2020-02-20 ENCOUNTER — TELEPHONE (OUTPATIENT)
Dept: FAMILY MEDICINE | Facility: CLINIC | Age: 75
End: 2020-02-20

## 2020-02-20 NOTE — TELEPHONE ENCOUNTER
Pt had f/u appt scheduled but Natalie thought he just needed a PSA as this was an outstanding lab only-thus she cancelled the appointment and we did not look into other issues- I did not see pt    Generally I would prefer we see him every 6 months- I think there was some confusion on Bnejy part    He has refills and can safely wait until August if he likes or we can get him back.      As far as the PSA there is not anything he can do to lower this- it likely represents inflammation in the gland-since the number went down some he can wait and we recheck to make sure not going back up or see urology but it is up to him-there is not a right or wrong

## 2020-02-20 NOTE — TELEPHONE ENCOUNTER
Roberto Carlos received his PSA results in the mail and wants to know what he can do to lower this? He didn't mention the option you wrote in your letter to go to urology. But I'm sure he'd be open to it if you are recommending it.      Second, he wanted to know why he didn't get a lipid panel done as he usually does this time of year. I informed him that perhaps it wasn't done because his values were all normal the last time it was done in August 2019.      Because a lipid panel was not done, he is confused of how often he needs to come in for an office visit with Dr. Palacios. Could you clarify how often he needs to come in and the scheduling of his blood work? I will call to inform him.        Thanks, Anaid Emmanuel's # [phone]

## 2020-02-21 NOTE — TELEPHONE ENCOUNTER
I called Cholo to inform him. I relayed the info you said about PSA and he would like to recheck it in 6 months.      As far as the lipids go, I told him he should come in for a fasting med check since it's been since August. Is this correct?      He said he will call on Monday to schedule an appt.

## 2020-02-27 ENCOUNTER — OFFICE VISIT (OUTPATIENT)
Dept: FAMILY MEDICINE | Facility: CLINIC | Age: 75
End: 2020-02-27

## 2020-02-27 VITALS
DIASTOLIC BLOOD PRESSURE: 68 MMHG | SYSTOLIC BLOOD PRESSURE: 154 MMHG | HEART RATE: 68 BPM | OXYGEN SATURATION: 99 % | WEIGHT: 151 LBS | BODY MASS INDEX: 23.13 KG/M2 | TEMPERATURE: 98.6 F

## 2020-02-27 DIAGNOSIS — E78.2 MIXED HYPERLIPIDEMIA: Primary | ICD-10-CM

## 2020-02-27 DIAGNOSIS — I10 ESSENTIAL HYPERTENSION, BENIGN: ICD-10-CM

## 2020-02-27 DIAGNOSIS — J43.9 PULMONARY EMPHYSEMA, UNSPECIFIED EMPHYSEMA TYPE (H): ICD-10-CM

## 2020-02-27 LAB
ALBUMIN SERPL-MCNC: 4.4 G/DL (ref 3.6–5.1)
ALBUMIN/GLOB SERPL: 1.8 {RATIO} (ref 1–2.5)
ALP SERPL-CCNC: 79 U/L (ref 33–130)
ALT 1742-6: 2 U/L (ref 0–32)
AST 1920-8: 14 U/L (ref 0–35)
BILIRUB SERPL-MCNC: 1.4 MG/DL (ref 0.2–1.2)
BUN SERPL-MCNC: 9 MG/DL (ref 7–25)
BUN/CREATININE RATIO: 11.3 (ref 6–22)
CALCIUM SERPL-MCNC: 10 MG/DL (ref 8.6–10.3)
CHLORIDE SERPLBLD-SCNC: 102.1 MMOL/L (ref 98–110)
CHOLEST SERPL-MCNC: 179 MG/DL (ref 0–199)
CHOLEST/HDLC SERPL: 2 {RATIO} (ref 0–5)
CO2 SERPL-SCNC: 32.4 MMOL/L (ref 20–32)
CREAT SERPL-MCNC: 0.8 MG/DL (ref 0.7–1.18)
GLOBULIN, CALCULATED - QUEST: 2.4 (ref 1.9–3.7)
GLUCOSE SERPL-MCNC: 108 MG/DL (ref 60–99)
HDLC SERPL-MCNC: 93 MG/DL (ref 40–150)
LDLC SERPL CALC-MCNC: 74 MG/DL (ref 0–130)
POTASSIUM SERPL-SCNC: 4.82 MMOL/L (ref 3.5–5.3)
PROT SERPL-MCNC: 6.8 G/DL (ref 6.1–8.1)
SODIUM SERPL-SCNC: 142.7 MMOL/L (ref 135–146)
TRIGL SERPL-MCNC: 62 MG/DL (ref 0–149)

## 2020-02-27 PROCEDURE — 80053 COMPREHEN METABOLIC PANEL: CPT | Performed by: FAMILY MEDICINE

## 2020-02-27 PROCEDURE — 80061 LIPID PANEL: CPT | Performed by: FAMILY MEDICINE

## 2020-02-27 PROCEDURE — 99214 OFFICE O/P EST MOD 30 MIN: CPT | Performed by: FAMILY MEDICINE

## 2020-02-27 PROCEDURE — 36415 COLL VENOUS BLD VENIPUNCTURE: CPT | Performed by: FAMILY MEDICINE

## 2020-02-27 NOTE — NURSING NOTE
Cholo is here for fasting med check    Pre-visit Screening:  Immunizations:  up to date  Colonoscopy:  is up to date  Mammogram: NA  Asthma Action Test/Plan:  MAGDALENO  PHQ9:  NA  GAD7:  NA  Questioned patient about current smoking habits Pt. quit smoking some time ago.  Ok to leave detailed message on voice mail for today's visit only Yes, phone # [phone]

## 2020-02-27 NOTE — LETTER
February 27, 2020      Roberto Carlos [place]  [address] [address] [address]        Dear ,    We are writing to inform you of your test results.    Your test results fall within the expected range(s) or remain unchanged from previous results.  Please continue with current treatment plan. Looks good overall.     Resulted Orders   Lipid Panel ([facility])   Result Value Ref Range    Cholesterol 179 0 - 199 mg/dL    Triglycerides 62 0 - 149 mg/dL    HDL Cholesterol 93 40 - 150 mg/dL    LDL Cholesterol Direct 74 0 - 130 mg/dL    Cholesterol/HDL Ratio 2 0 - 5   Comprehensive Metobolic Panel ([facility])   Result Value Ref Range    Carbon Dioxide 32.4 (A) 20 - 32 mmol/L    Creatinine 0.80 0.70 - 1.18 mg/dL    Glucose 108 (A) 60 - 99 mg/dL    Sodium 142.7 135 - 146 mmol/L    Potassium 4.82 3.5 - 5.3 mmol/L    Chloride 102.1 98 - 110 mmol/L    Protein Total 6.8 6.1 - 8.1 g/dL    Albumin 4.4 3.6 - 5.1 g/dL    Alkaline Phosphatase 79 33 - 130 U/L    ALT 2 0 - 32 U/L    AST 14 0 - 35 U/L    Bilirubin Total 1.4 (A) 0.2 - 1.2 mg/dL    Urea Nitrogen 9 7 - 25 mg/dL    Calcium 10.0 8.6 - 10.3 mg/dL    BUN/Creatinine Ratio 11.3 6 - 22    Globulin Calculated 2.4 1.9 - 3.7    A/G Ratio 1.8 1 - 2.5       If you have any questions or concerns, please call the clinic at the number listed above.       Sincerely,        Leonard Palacios MD

## 2020-02-27 NOTE — PROGRESS NOTES
Subjective     Roberto Carlos Still is a 75 year old male who presents to clinic today for the following health issues:    HPI   Hyperlipidemia Follow-Up      Are you regularly taking any medication or supplement to lower your cholesterol?   Yes- simvastatin    Are you having muscle aches or other side effects that you think could be caused by your cholesterol lowering medication?  No    Hypertension Follow-up      Do you check your blood pressure regularly outside of the clinic? Yes     Are you following a low salt diet? No    Are your blood pressures ever more than 140 on the top number (systolic) OR more   than 90 on the bottom number (diastolic), for example 140/90? No    COPD Follow-Up    Overall, how are your COPD symptoms since your last clinic visit?  No change    How much fatigue or shortness of breath do you have when you are walking?  Same as usual    How much shortness of breath do you have when you are resting?  Same as usual    How often do you cough? Rarely    Have you noticed any change in your sputum/phlegm?  No    Have you experienced a recent fever? No    Please describe how far you can walk without stopping to rest:  Less than 1 block    How many flights of stairs are you able to walk up without stopping?  1    Have you had any Emergency Room Visits, Urgent Care Visits, or Hospital Admissions because of your COPD since your last office visit?  No    History   Smoking Status     Former Smoker     Packs/day: 0.50     Years: 35.00   Smokeless Tobacco     Never Used     No results found for: FEV1, [id number]      How many servings of fruits and vegetables do you eat daily?  2-3    On average, how many sweetened beverages do you drink each day (Examples: soda, juice, sweet tea, etc.  Do NOT count diet or artificially sweetened beverages)?   1    How many days per week do you exercise enough to make your heart beat faster? 3 or less    How many minutes a day do you exercise enough to make your heart beat  faster? 10 - 19    How many days per week do you miss taking your medication? 0        Patient Active Problem List   Diagnosis     Coronary atherosclerosis     Essential hypertension, benign     ACP (advance care planning)     Health Care Home     COPD (chronic obstructive pulmonary disease) (H)     Mixed hyperlipidemia     Past Surgical History:   Procedure Laterality Date     C APPENDECTOMY       C REPR ASD W BYPASS  5/97    One vessel angioplasty and stint     EYE EXAM ESTABLISHED PT  12/06     HC COLONOSCOPY THRU STOMA, DIAGNOSTIC  6/05     HCL PSA, DIAGNOSTIC (TUMOR MARKER)  3/06    normal     REMOVAL OF SPERM DUCT(S)      Vasectomy       Social History     Tobacco Use     Smoking status: Former Smoker     Packs/day: 0.50     Years: 35.00     Pack years: 17.50     Smokeless tobacco: Never Used   Substance Use Topics     Alcohol use: Yes     Alcohol/week: 11.7 standard drinks     Family History   Problem Relation Age of Onset     Alzheimer Disease Mother      Heart Disease Father      Cancer No family hx of      Diabetes No family hx of          Current Outpatient Medications   Medication Sig Dispense Refill     amLODIPine (NORVASC) 5 MG tablet Take 1 tablet (5 mg) by mouth daily 90 tablet 3     ASPIRIN 325 MG OR TBEC 1 tab po QD (Once per day) 100 3     atenolol (TENORMIN) 50 MG tablet Take 1 tablet (50 mg) by mouth daily 90 tablet 3     order for DME Equipment being ordered: Oxygen and overnight oxymetry 1 Device 0     simvastatin (ZOCOR) 20 MG tablet Take 1 tablet (20 mg) by mouth At Bedtime 90 tablet 3     tiotropium (SPIRIVA RESPIMAT) 2.5 MCG/ACT inhalation aerosol Inhale 2 puffs into the lungs daily 4 g 1     umeclidinium (INCRUSE ELLIPTA) 62.5 MCG/INH inhaler        Allergies   Allergen Reactions     Cephalexin Rash     Recent Labs   Lab Test 08/29/19 03/14/19  0925 08/28/18  1040 02/27/18  1020   LDL 84 82 64 74   HDL 87 80 88 71   TRIG 55 46 44 44   ALT  --  10 12 9   CR 0.84 0.70 0.63* 0.61*    GFRESTIMATED  --  92 96 98   POTASSIUM 4.96 4.4 4.8 5.0      BP Readings from Last 3 Encounters:   02/27/20 (!) 154/68   09/13/19 134/64   08/29/19 138/70    Wt Readings from Last 3 Encounters:   02/27/20 68.5 kg (151 lb)   02/11/20 70.3 kg (155 lb)   09/13/19 69.9 kg (154 lb)                      Reviewed and updated as needed this visit by Provider         Review of Systems   ROS COMP: Constitutional, HEENT, cardiovascular, pulmonary, gi and gu systems are negative, except as otherwise noted.      Objective    BP (!) 154/68 (BP Location: Right arm, Patient Position: Sitting, Cuff Size: Adult Large)   Pulse 68   Temp 98.6  F (37  C) (Oral)   Wt 68.5 kg (151 lb)   SpO2 99%   BMI 23.13 kg/m    Body mass index is 23.13 kg/m .  Physical Exam   GENERAL: healthy, alert and no distress  NECK: no adenopathy, no asymmetry, masses, or scars and thyroid normal to palpation  RESP: lungs clear to auscultation - no rales, rhonchi or wheezes, decreased breath sounds  CV: regular rate and rhythm, normal S1 S2, no S3 or S4, no murmur, click or rub, no peripheral edema and peripheral pulses strong  ABDOMEN: soft, nontender, no hepatosplenomegaly, no masses and bowel sounds normal  MS: no gross musculoskeletal defects noted, no edema    Diagnostic Test Results:  Labs reviewed in Epic        Assessment & Plan   Assessment      Plan  (E78.2) Mixed hyperlipidemia  (primary encounter diagnosis)  Comment: control uncertain  Plan: continue current medications at current doses pending lbs    (I10) Essential hypertension, benign  Comment: elevated today but has not been previously  Plan: continue current medications at current doses , Check blood pressure readings outside of the clinic several times per week, write down values, and follow up if elevated within the next several weeks. Blood pressure can be checked at the firestation, drugstore,  or any valid site.     (J43.9) Pulmonary emphysema, unspecified emphysema type  (H)  Comment: stable-seeing MD in a week  Plan: continue current medications at current doses       FUTURE APPOINTMENTS:       - Follow-up visit in 1 yr  Regular exercise    No follow-ups on file.    Leonard Palacios MD  [facility]

## 2020-03-03 ENCOUNTER — TELEPHONE (OUTPATIENT)
Dept: FAMILY MEDICINE | Facility: CLINIC | Age: 75
End: 2020-03-03

## 2020-03-03 NOTE — TELEPHONE ENCOUNTER
Pt called to update you. He took his BP today it read 151/67 he felt this was going down and he was happy with that. His recent appt it was (154/68). He stated he will recheck again on Thursday and give you an update then.    Thanks, suyapa

## 2020-03-16 NOTE — TELEPHONE ENCOUNTER
Pt called to update you that on 03/13 he had an appt at [facility] and his BP was 141/81. You can close once noted or route back with any comments.    Thanks, suyapa

## 2020-03-19 ENCOUNTER — TRANSFERRED RECORDS (OUTPATIENT)
Dept: FAMILY MEDICINE | Facility: CLINIC | Age: 75
End: 2020-03-19

## 2020-09-10 DIAGNOSIS — I25.10 ATHEROSCLEROSIS OF NATIVE CORONARY ARTERY OF NATIVE HEART WITHOUT ANGINA PECTORIS: ICD-10-CM

## 2020-09-10 DIAGNOSIS — I10 ESSENTIAL HYPERTENSION, BENIGN: ICD-10-CM

## 2020-09-10 DIAGNOSIS — E78.2 MIXED HYPERLIPIDEMIA: ICD-10-CM

## 2020-09-10 RX ORDER — SIMVASTATIN 20 MG
20 TABLET ORAL AT BEDTIME
Qty: 90 TABLET | Refills: 1 | Status: SHIPPED | OUTPATIENT
Start: 2020-09-10 | End: 2021-02-17

## 2020-09-10 RX ORDER — ATENOLOL 50 MG/1
50 TABLET ORAL DAILY
Qty: 90 TABLET | Refills: 1 | Status: SHIPPED | OUTPATIENT
Start: 2020-09-10 | End: 2021-02-17

## 2020-09-10 RX ORDER — AMLODIPINE BESYLATE 5 MG/1
5 TABLET ORAL DAILY
Qty: 90 TABLET | Refills: 1 | Status: SHIPPED | OUTPATIENT
Start: 2020-09-10 | End: 2021-02-17

## 2021-02-12 ENCOUNTER — IMMUNIZATION (OUTPATIENT)
Dept: NURSING | Facility: CLINIC | Age: 76
End: 2021-02-12
Payer: COMMERCIAL

## 2021-02-12 PROCEDURE — 91300 PR COVID VAC PFIZER DIL RECON 30 MCG/0.3 ML IM: CPT

## 2021-02-12 PROCEDURE — 0001A PR COVID VAC PFIZER DIL RECON 30 MCG/0.3 ML IM: CPT

## 2021-02-17 ENCOUNTER — OFFICE VISIT (OUTPATIENT)
Dept: FAMILY MEDICINE | Facility: CLINIC | Age: 76
End: 2021-02-17

## 2021-02-17 VITALS
BODY MASS INDEX: 23.16 KG/M2 | TEMPERATURE: 98.1 F | RESPIRATION RATE: 22 BRPM | DIASTOLIC BLOOD PRESSURE: 72 MMHG | WEIGHT: 151.2 LBS | HEART RATE: 84 BPM | SYSTOLIC BLOOD PRESSURE: 138 MMHG

## 2021-02-17 DIAGNOSIS — I25.10 ATHEROSCLEROSIS OF NATIVE CORONARY ARTERY OF NATIVE HEART WITHOUT ANGINA PECTORIS: ICD-10-CM

## 2021-02-17 DIAGNOSIS — E78.2 MIXED HYPERLIPIDEMIA: ICD-10-CM

## 2021-02-17 DIAGNOSIS — R73.09 ELEVATED GLUCOSE LEVEL: ICD-10-CM

## 2021-02-17 DIAGNOSIS — J43.9 PULMONARY EMPHYSEMA, UNSPECIFIED EMPHYSEMA TYPE (H): ICD-10-CM

## 2021-02-17 DIAGNOSIS — Z71.89 ACP (ADVANCE CARE PLANNING): Chronic | ICD-10-CM

## 2021-02-17 DIAGNOSIS — I10 ESSENTIAL HYPERTENSION, BENIGN: Primary | ICD-10-CM

## 2021-02-17 LAB
ALBUMIN SERPL-MCNC: 4.2 G/DL (ref 3.6–5.1)
ALBUMIN/GLOB SERPL: 1.6 {RATIO} (ref 1–2.5)
ALP SERPL-CCNC: 84 U/L (ref 33–130)
ALT 1742-6: 16 U/L (ref 0–32)
AST 1920-8: 29 U/L (ref 0–35)
BILIRUB SERPL-MCNC: 1.4 MG/DL (ref 0.2–1.2)
BUN SERPL-MCNC: 10 MG/DL (ref 7–25)
BUN/CREATININE RATIO: 12.5 (ref 6–22)
CALCIUM SERPL-MCNC: 9.8 MG/DL (ref 8.6–10.3)
CHLORIDE SERPLBLD-SCNC: 99.8 MMOL/L (ref 98–110)
CHOLEST SERPL-MCNC: 191 MG/DL (ref 0–199)
CHOLEST/HDLC SERPL: 2 {RATIO} (ref 0–5)
CO2 SERPL-SCNC: 32.8 MMOL/L (ref 20–32)
CREAT SERPL-MCNC: 0.8 MG/DL (ref 0.6–1.3)
GLOBULIN, CALCULATED - QUEST: 2.7 (ref 1.9–3.7)
GLUCOSE SERPL-MCNC: 127 MG/DL (ref 60–99)
HDLC SERPL-MCNC: 100 MG/DL (ref 40–150)
LDLC SERPL CALC-MCNC: 79 MG/DL (ref 0–130)
POTASSIUM SERPL-SCNC: 4.72 MMOL/L (ref 3.5–5.3)
PROT SERPL-MCNC: 6.9 G/DL (ref 6.1–8.1)
SODIUM SERPL-SCNC: 141.7 MMOL/L (ref 135–146)
TRIGL SERPL-MCNC: 59 MG/DL (ref 0–149)

## 2021-02-17 PROCEDURE — 80061 LIPID PANEL: CPT | Performed by: FAMILY MEDICINE

## 2021-02-17 PROCEDURE — 83036 HEMOGLOBIN GLYCOSYLATED A1C: CPT | Performed by: FAMILY MEDICINE

## 2021-02-17 PROCEDURE — 99214 OFFICE O/P EST MOD 30 MIN: CPT | Performed by: FAMILY MEDICINE

## 2021-02-17 PROCEDURE — 80053 COMPREHEN METABOLIC PANEL: CPT | Performed by: FAMILY MEDICINE

## 2021-02-17 PROCEDURE — 36415 COLL VENOUS BLD VENIPUNCTURE: CPT | Performed by: FAMILY MEDICINE

## 2021-02-17 RX ORDER — ATENOLOL 50 MG/1
50 TABLET ORAL DAILY
Qty: 90 TABLET | Refills: 3 | Status: SHIPPED | OUTPATIENT
Start: 2021-02-17 | End: 2022-03-04

## 2021-02-17 RX ORDER — SIMVASTATIN 20 MG
20 TABLET ORAL AT BEDTIME
Qty: 90 TABLET | Refills: 3 | Status: SHIPPED | OUTPATIENT
Start: 2021-02-17 | End: 2022-03-04

## 2021-02-17 RX ORDER — AMLODIPINE BESYLATE 5 MG/1
5 TABLET ORAL DAILY
Qty: 90 TABLET | Refills: 3 | Status: SHIPPED | OUTPATIENT
Start: 2021-02-17 | End: 2022-03-04

## 2021-02-17 NOTE — PROGRESS NOTES
Assessment & Plan     Essential hypertension, benign  Well controlled, continue current medications at current doses   - amLODIPine (NORVASC) 5 MG tablet  Dispense: 90 tablet; Refill: 1  - atenolol (TENORMIN) 50 MG tablet  Dispense: 90 tablet; Refill: 1    Atherosclerosis of native coronary artery of native heart without angina pectoris  stable symptomatically , continue current medications at current doses   - atenolol (TENORMIN) 50 MG tablet  Dispense: 90 tablet; Refill: 1  - simvastatin (ZOCOR) 20 MG tablet  Dispense: 90 tablet; Refill: 1    Mixed hyperlipidemia  conrol uncertain, continue current medications at current doses pending labs  - simvastatin (ZOCOR) 20 MG tablet  Dispense: 90 tablet; Refill: 1    Pulmonary emphysema, unspecified emphysema type (H)  stable symptomatically , reviewed last viit with Dr Whittington      Review of external notes as documented elsewhere in note  Review of the result(s) of each unique test - labs today         FUTURE APPOINTMENTS:       - Follow-up visit in 1 yr  Regular exercise    No follow-ups on file.    Leonard Palacios MD  [facility]    Pamela Emmanuel is a 76 year old who presents for the following health issues     HPI       Hyperlipidemia Follow-Up      Are you regularly taking any medication or supplement to lower your cholesterol?   Yes- simvastatin    Are you having muscle aches or other side effects that you think could be caused by your cholesterol lowering medication?  No    Hypertension Follow-up      Do you check your blood pressure regularly outside of the clinic? Yes     Are you following a low salt diet? No    Are your blood pressures ever more than 140 on the top number (systolic) OR more   than 90 on the bottom number (diastolic), for example 140/90? No    Vascular Disease Follow-up      How often do you take nitroglycerin? Never    Do you take an aspirin every day? Yes    COPD [id number]-amszylru notes from Dr Whittington  3/20    Overall, how are your COPD symptoms since your last clinic visit?  No change    How much fatigue or shortness of breath do you have when you are walking?  Same as usual    How much shortness of breath do you have when you are resting?  Same as usual    How often do you cough? Rarely    Have you noticed any change in your sputum/phlegm?  No    Have you experienced a recent fever? No    Please describe how far you can walk without stopping to rest:  Less than 1 block    How many flights of stairs are you able to walk up without stopping?  2    Have you had any Emergency Room Visits, Urgent Care Visits, or Hospital Admissions because of your COPD since your last office visit?  No    History   Smoking Status     Former Smoker     Packs/day: 0.50     Years: 35.00   Smokeless Tobacco     Never Used     No results found for: FEV1, [id number]      How many servings of fruits and vegetables do you eat daily?  2-3    On average, how many sweetened beverages do you drink each day (Examples: soda, juice, sweet tea, etc.  Do NOT count diet or artificially sweetened beverages)?   1    How many days per week do you exercise enough to make your heart beat faster? 5    How many minutes a day do you exercise enough to make your heart beat faster? 10 - 19    How many days per week do you miss taking your medication? 0        Review of Systems   Constitutional, HEENT, cardiovascular, pulmonary, gi and gu systems are negative, except as otherwise noted.      Objective    /72 (BP Location: Left arm, Patient Position: Chair, Cuff Size: Adult Regular)   Pulse 84   Temp 98.1  F (36.7  C)   Resp 22   Wt 68.6 kg (151 lb 3.2 oz)   BMI 23.16 kg/m    Body mass index is 23.16 kg/m .  Physical Exam   GENERAL: healthy, alert and no distress  EYES: Eyes grossly normal to inspection, PERRL and conjunctivae and sclerae normal  HENT: ear canals and TM's normal, nose and mouth without ulcers or lesions  NECK: no adenopathy, no asymmetry,  masses, or scars and thyroid normal to palpation  RESP: lungs clear to auscultation - no rales, rhonchi or wheezes  CV: regular rate and rhythm, normal S1 S2, no S3 or S4, no murmur, click or rub, no peripheral edema and peripheral pulses strong  ABDOMEN: soft, nontender, no hepatosplenomegaly, no masses and bowel sounds normal  MS: no gross musculoskeletal defects noted, no edema  PSYCH: mentation appears normal, affect normal/bright    Results for orders placed or performed in visit on 02/17/21 (from the past 24 hour(s))   Lipid Panel ([facility])   Result Value Ref Range    Cholesterol 191 0 - 199 mg/dL    Triglycerides 59 0 - 149 mg/dL    HDL Cholesterol 100 40 - 150 mg/dL    LDL Cholesterol Direct 79 0 - 130 mg/dL    Cholesterol/HDL Ratio 2 0 - 5   Comprehensive Metobolic Panel ([facility])   Result Value Ref Range    Carbon Dioxide 32.8 (A) 20 - 32 mmol/L    Creatinine 0.80 0.60 - 1.30 mg/dL    Glucose 127 (A) 60 - 99 mg/dL    Sodium 141.7 135 - 146 mmol/L    Potassium 4.72 3.5 - 5.3 mmol/L    Chloride 99.8 98 - 110 mmol/L    Protein Total 6.9 6.1 - 8.1 g/dL    Albumin 4.2 3.6 - 5.1 g/dL    Alkaline Phosphatase 84 33 - 130 U/L    ALT 16 0 - 32 U/L    AST 29 0 - 35 U/L    Bilirubin Total 1.4 (A) 0.2 - 1.2 mg/dL    Urea Nitrogen 10 7 - 25 mg/dL    Calcium 9.8 8.6 - 10.3 mg/dL    BUN/Creatinine Ratio 12.5 6 - 22    Globulin Calculated 2.7 1.9 - 3.7    A/G Ratio 1.6 1 - 2.5

## 2021-02-18 LAB — HBA1C MFR BLD: 4.8 % (ref 4–7)

## 2021-03-05 ENCOUNTER — IMMUNIZATION (OUTPATIENT)
Dept: NURSING | Facility: CLINIC | Age: 76
End: 2021-03-05
Attending: INTERNAL MEDICINE
Payer: COMMERCIAL

## 2021-03-05 PROCEDURE — 0002A PR COVID VAC PFIZER DIL RECON 30 MCG/0.3 ML IM: CPT

## 2021-03-05 PROCEDURE — 91300 PR COVID VAC PFIZER DIL RECON 30 MCG/0.3 ML IM: CPT

## 2021-03-13 ENCOUNTER — HEALTH MAINTENANCE LETTER (OUTPATIENT)
Age: 76
End: 2021-03-13

## 2021-10-23 ENCOUNTER — HEALTH MAINTENANCE LETTER (OUTPATIENT)
Age: 76
End: 2021-10-23

## 2022-02-02 ENCOUNTER — OFFICE VISIT (OUTPATIENT)
Dept: FAMILY MEDICINE | Facility: CLINIC | Age: 77
End: 2022-02-02

## 2022-02-02 VITALS
TEMPERATURE: 97.5 F | DIASTOLIC BLOOD PRESSURE: 72 MMHG | WEIGHT: 146.2 LBS | RESPIRATION RATE: 20 BRPM | SYSTOLIC BLOOD PRESSURE: 130 MMHG | BODY MASS INDEX: 22.39 KG/M2 | HEART RATE: 76 BPM

## 2022-02-02 DIAGNOSIS — I25.10 ATHEROSCLEROSIS OF NATIVE CORONARY ARTERY OF NATIVE HEART WITHOUT ANGINA PECTORIS: ICD-10-CM

## 2022-02-02 DIAGNOSIS — I10 ESSENTIAL HYPERTENSION, BENIGN: ICD-10-CM

## 2022-02-02 DIAGNOSIS — T14.8XXA SKIN ABRASION: ICD-10-CM

## 2022-02-02 DIAGNOSIS — E78.2 MIXED HYPERLIPIDEMIA: Primary | ICD-10-CM

## 2022-02-02 DIAGNOSIS — J43.9 PULMONARY EMPHYSEMA, UNSPECIFIED EMPHYSEMA TYPE (H): ICD-10-CM

## 2022-02-02 LAB
ALBUMIN SERPL-MCNC: 4.1 G/DL (ref 3.6–5.1)
ALBUMIN/GLOB SERPL: 1.4 {RATIO} (ref 1–2.5)
ALP SERPL-CCNC: 85 U/L (ref 33–130)
ALT 1742-6: 15 U/L (ref 0–32)
AST 1920-8: 30 U/L (ref 0–35)
BILIRUB SERPL-MCNC: 1.3 MG/DL (ref 0.2–1.2)
BUN SERPL-MCNC: 10 MG/DL (ref 7–25)
BUN/CREATININE RATIO: 12.2 (ref 6–22)
CALCIUM SERPL-MCNC: 9.7 MG/DL (ref 8.6–10.3)
CHLORIDE SERPLBLD-SCNC: 98.4 MMOL/L (ref 98–110)
CHOLEST SERPL-MCNC: 173 MG/DL (ref 0–199)
CHOLEST/HDLC SERPL: 2 {RATIO} (ref 0–5)
CO2 SERPL-SCNC: 33.3 MMOL/L (ref 20–32)
CREAT SERPL-MCNC: 0.82 MG/DL (ref 0.6–1.3)
GLOBULIN, CALCULATED - QUEST: 2.9 (ref 1.9–3.7)
GLUCOSE SERPL-MCNC: 113 MG/DL (ref 60–99)
HDLC SERPL-MCNC: 89 MG/DL (ref 40–150)
LDLC SERPL CALC-MCNC: 75 MG/DL (ref 0–130)
POTASSIUM SERPL-SCNC: 4.79 MMOL/L (ref 3.5–5.3)
PROT SERPL-MCNC: 7 G/DL (ref 6.1–8.1)
SODIUM SERPL-SCNC: 138.8 MMOL/L (ref 135–146)
TRIGL SERPL-MCNC: 47 MG/DL (ref 0–149)

## 2022-02-02 PROCEDURE — 99214 OFFICE O/P EST MOD 30 MIN: CPT | Performed by: FAMILY MEDICINE

## 2022-02-02 PROCEDURE — 80061 LIPID PANEL: CPT | Performed by: FAMILY MEDICINE

## 2022-02-02 PROCEDURE — 36415 COLL VENOUS BLD VENIPUNCTURE: CPT | Performed by: FAMILY MEDICINE

## 2022-02-02 PROCEDURE — 80053 COMPREHEN METABOLIC PANEL: CPT | Performed by: FAMILY MEDICINE

## 2022-02-02 RX ORDER — SIMVASTATIN 20 MG
20 TABLET ORAL AT BEDTIME
Qty: 90 TABLET | Refills: 3 | Status: CANCELLED | OUTPATIENT
Start: 2022-02-02

## 2022-02-02 RX ORDER — BUDESONIDE AND FORMOTEROL FUMARATE DIHYDRATE 160; 4.5 UG/1; UG/1
AEROSOL RESPIRATORY (INHALATION)
COMMUNITY
Start: 2021-07-15 | End: 2023-02-07

## 2022-02-02 RX ORDER — AMLODIPINE BESYLATE 5 MG/1
5 TABLET ORAL DAILY
Qty: 90 TABLET | Refills: 3 | Status: CANCELLED | OUTPATIENT
Start: 2022-02-02

## 2022-02-02 RX ORDER — ATENOLOL 50 MG/1
50 TABLET ORAL DAILY
Qty: 90 TABLET | Refills: 3 | Status: CANCELLED | OUTPATIENT
Start: 2022-02-02

## 2022-02-02 NOTE — NURSING NOTE
Roberto Carlos Still is here for a CPX.    Pre-visit planning  Immunizations -up to date  Colonoscopy -  Mammogram -  Asthma test --  PHQ9 -  WILLIAN 7 -  Hearing screen -    Questioned patient about current smoking habits.  Pt. quit smoking some time ago.  Body mass index is 22.39 kg/m .  PULSE regular  My Chart: active  CLASSIFICATION OF OVERWEIGHT AND OBESITY BY BMI                        Obesity Class           BMI(kg/m2)  Underweight                                    < 18.5  Normal                                         18.5-24.9  Overweight                                     25.0-29.9  OBESITY                     I                  30.0-34.9                             II                 35.0-39.9  EXTREME OBESITY             III                >40                            Patient's  BMI Body mass index is 22.39 kg/m .

## 2022-02-02 NOTE — PROGRESS NOTES
Assessment & Plan     Mixed hyperlipidemia  Control uncertain, continue current medications at current doses pending labs  - Lipid Panel ([facility])  - VENOUS COLLECTION  - Comprehensive Metobolic Panel ([facility])    Essential hypertension, benign  Well controlled, continue current medications at current doses   - VENOUS COLLECTION  - Comprehensive Metobolic Panel ([facility])    Atherosclerosis of native coronary artery of native heart without angina pectoris  stable symptomatically , no symptoms , continue current medications at current doses     Pulmonary emphysema, unspecified emphysema type (H)  Stable control, continue current medications at current doses , see Dr Whittington as planned    Skin abrasion  No signs infection, discussed cleansing and topical abx for a few more days, expect eschar to resolve on own, f/u if not improving or worsening       Review of external notes as documented elsewhere in note  Review of the result(s) of each unique test - labs  Ordering of each unique test  Prescription drug management         FUTURE APPOINTMENTS:       - Follow-up visit in 1 yr  Regular exercise    No follow-ups on file.    Leonard Palacios MD  [facility]    Pamela Emmanuel is a 77 year old who presents for the following health issues     HPI     Hyperlipidemia Follow-Up      Are you regularly taking any medication or supplement to lower your cholesterol?   Yes- simvastatin    Are you having muscle aches or other side effects that you think could be caused by your cholesterol lowering medication?  No    Hypertension Follow-up      Do you check your blood pressure regularly outside of the clinic? Yes     Are you following a low salt diet? No    Are your blood pressures ever more than 140 on the top number (systolic) OR more   than 90 on the bottom number (diastolic), for example 140/90? No    Vascular Disease Fezmer-lz-rxr chosen to no longer see cardiology unless issues-stent 2001    How often do you take  nitroglycerin? Never    Do you take an aspirin every day? Yes    COPD Follow-Up, sees Dr Whittington, on O2, reviewed last note    Overall, how are your COPD symptoms since your last clinic visit?  No change    How much fatigue or shortness of breath do you have when you are walking?  Same as usual    How much shortness of breath do you have when you are resting?  Same as usual    How often do you cough? Never    Have you noticed any change in your sputum/phlegm?  No    Have you experienced a recent fever? No    Please describe how far you can walk without stopping to rest:  2-5 blocks    How many flights of stairs are you able to walk up without stopping?  2    Have you had any Emergency Room Visits, Urgent Care Visits, or Hospital Admissions because of your COPD since your last office visit?  No    History   Smoking Status     Former Smoker     Packs/day: 0.50     Years: 35.00   Smokeless Tobacco     Never Used     No results found for: FEV1, [id number]      How many servings of fruits and vegetables do you eat daily?  2-3    On average, how many sweetened beverages do you drink each day (Examples: soda, juice, sweet tea, etc.  Do NOT count diet or artificially sweetened beverages)?   1    How many days per week do you exercise enough to make your heart beat faster? 3 or less    How many minutes a day do you exercise enough to make your heart beat faster? 20 - 29    How many days per week do you miss taking your medication? 0        Review of Systems   Constitutional, HEENT, cardiovascular, pulmonary, gi and gu systems are negative, except as otherwise noted.      Objective    /72 (BP Location: Right arm, Patient Position: Chair, Cuff Size: Adult Regular)   Pulse 76   Temp 97.5  F (36.4  C)   Resp 20   Wt 66.3 kg (146 lb 3.2 oz)   BMI 22.39 kg/m    Body mass index is 22.39 kg/m .  Physical Exam   GENERAL: healthy, alert and no distress  EYES: Eyes grossly normal to inspection, PERRL and conjunctivae and sclerae  normal  HENT: ear canals and TM's normal, nose and mouth without ulcers or lesions  NECK: no adenopathy, no asymmetry, masses, or scars and thyroid normal to palpation  RESP: lungs clear to auscultation - no rales, rhonchi or wheezes  CV: regular rate and rhythm, normal S1 S2, no S3 or S4, no murmur, click or rub, no peripheral edema and peripheral pulses strong  ABDOMEN: soft, nontender, no hepatosplenomegaly, no masses and bowel sounds normal  MS: no gross musculoskeletal defects noted, no edema  PSYCH: mentation appears normal, affect normal/bright  SKIN: left scalp abrasion, small eschar, no drainage, no ulceration    Office Visit on 02/17/2021   Component Date Value Ref Range Status     Cholesterol 02/17/2021 191  0 - 199 mg/dL Final     Triglycerides 02/17/2021 59  0 - 149 mg/dL Final     HDL Cholesterol 02/17/2021 100  40 - 150 mg/dL Final     LDL Cholesterol Direct 02/17/2021 79  0 - 130 mg/dL Final     Cholesterol/HDL Ratio 02/17/2021 2  0 - 5 Final     Carbon Dioxide 02/17/2021 32.8* 20 - 32 mmol/L Final     Creatinine 02/17/2021 0.80  0.60 - 1.30 mg/dL Final     Glucose 02/17/2021 127* 60 - 99 mg/dL Final     Sodium 02/17/2021 141.7  135 - 146 mmol/L Final     Potassium 02/17/2021 4.72  3.5 - 5.3 mmol/L Final     Chloride 02/17/2021 99.8  98 - 110 mmol/L Final     Protein Total 02/17/2021 6.9  6.1 - 8.1 g/dL Final     Albumin 02/17/2021 4.2  3.6 - 5.1 g/dL Final     Alkaline Phosphatase 02/17/2021 84  33 - 130 U/L Final     ALT 02/17/2021 16  0 - 32 U/L Final     AST 02/17/2021 29  0 - 35 U/L Final     Bilirubin Total 02/17/2021 1.4* 0.2 - 1.2 mg/dL Final     Urea Nitrogen 02/17/2021 10  7 - 25 mg/dL Final     Calcium 02/17/2021 9.8  8.6 - 10.3 mg/dL Final     BUN/Creatinine Ratio 02/17/2021 12.5  6 - 22 Final     Globulin Calculated 02/17/2021 2.7  1.9 - 3.7 Final     A/G Ratio 02/17/2021 1.6  1 - 2.5 Final     Hemoglobin A1C POCT 02/17/2021 4.8  4.0 - 7.0 % Final

## 2022-03-04 DIAGNOSIS — I25.10 ATHEROSCLEROSIS OF NATIVE CORONARY ARTERY OF NATIVE HEART WITHOUT ANGINA PECTORIS: ICD-10-CM

## 2022-03-04 DIAGNOSIS — E78.2 MIXED HYPERLIPIDEMIA: ICD-10-CM

## 2022-03-04 DIAGNOSIS — I10 ESSENTIAL HYPERTENSION, BENIGN: ICD-10-CM

## 2022-03-04 RX ORDER — AMLODIPINE BESYLATE 5 MG/1
5 TABLET ORAL DAILY
Qty: 90 TABLET | Refills: 3 | Status: SHIPPED | OUTPATIENT
Start: 2022-03-04 | End: 2023-02-07

## 2022-03-04 RX ORDER — SIMVASTATIN 20 MG
20 TABLET ORAL AT BEDTIME
Qty: 90 TABLET | Refills: 3 | Status: SHIPPED | OUTPATIENT
Start: 2022-03-04 | End: 2023-02-07

## 2022-03-04 RX ORDER — ATENOLOL 50 MG/1
50 TABLET ORAL DAILY
Qty: 90 TABLET | Refills: 3 | Status: SHIPPED | OUTPATIENT
Start: 2022-03-04 | End: 2023-02-07

## 2022-03-04 NOTE — TELEPHONE ENCOUNTER
Pt last seen 02/02/22.    Roberto Carlos Still is requesting a refill of:    Pending Prescriptions:                       Disp   Refills    amLODIPine (NORVASC) 5 MG tablet [Pharmac*90 tab*3            Sig: Take 1 tablet (5 mg) by mouth daily    atenolol (TENORMIN) 50 MG tablet [Pharmac*90 tab*3            Sig: Take 1 tablet (50 mg) by mouth daily    simvastatin (ZOCOR) 20 MG tablet [Pharmac*90 tab*3            Sig: Take 1 tablet (20 mg) by mouth At Bedtime

## 2022-03-17 ENCOUNTER — TRANSFERRED RECORDS (OUTPATIENT)
Dept: FAMILY MEDICINE | Facility: CLINIC | Age: 77
End: 2022-03-17

## 2022-04-09 ENCOUNTER — HEALTH MAINTENANCE LETTER (OUTPATIENT)
Age: 77
End: 2022-04-09

## 2022-10-09 ENCOUNTER — HEALTH MAINTENANCE LETTER (OUTPATIENT)
Age: 77
End: 2022-10-09

## 2023-02-07 ENCOUNTER — OFFICE VISIT (OUTPATIENT)
Dept: FAMILY MEDICINE | Facility: CLINIC | Age: 78
End: 2023-02-07

## 2023-02-07 VITALS
SYSTOLIC BLOOD PRESSURE: 124 MMHG | BODY MASS INDEX: 21.82 KG/M2 | HEART RATE: 75 BPM | OXYGEN SATURATION: 95 % | HEIGHT: 67 IN | TEMPERATURE: 97.2 F | DIASTOLIC BLOOD PRESSURE: 68 MMHG | WEIGHT: 139 LBS

## 2023-02-07 DIAGNOSIS — E78.2 MIXED HYPERLIPIDEMIA: ICD-10-CM

## 2023-02-07 DIAGNOSIS — I25.10 ATHEROSCLEROSIS OF NATIVE CORONARY ARTERY OF NATIVE HEART WITHOUT ANGINA PECTORIS: ICD-10-CM

## 2023-02-07 DIAGNOSIS — Z00.00 ENCOUNTER FOR ANNUAL WELLNESS EXAM IN MEDICARE PATIENT: ICD-10-CM

## 2023-02-07 DIAGNOSIS — I10 ESSENTIAL HYPERTENSION, BENIGN: ICD-10-CM

## 2023-02-07 DIAGNOSIS — J43.9 PULMONARY EMPHYSEMA, UNSPECIFIED EMPHYSEMA TYPE (H): ICD-10-CM

## 2023-02-07 DIAGNOSIS — Z00.00 ROUTINE GENERAL MEDICAL EXAMINATION AT A HEALTH CARE FACILITY: Primary | ICD-10-CM

## 2023-02-07 LAB
ALBUMIN SERPL-MCNC: 3.8 G/DL (ref 3.6–5.1)
ALBUMIN/GLOB SERPL: 1.5 {RATIO} (ref 1–2.5)
ALP SERPL-CCNC: 78 U/L (ref 33–130)
ALT 1742-6: 10 U/L (ref 0–32)
AST 1920-8: 29 U/L (ref 0–35)
BILIRUB SERPL-MCNC: 1.5 MG/DL (ref 0.2–1.2)
BUN SERPL-MCNC: 12 MG/DL (ref 7–25)
BUN/CREATININE RATIO: 14.6 (ref 6–22)
CALCIUM SERPL-MCNC: 9.4 MG/DL (ref 8.6–10.3)
CHLORIDE SERPLBLD-SCNC: 96.3 MMOL/L (ref 98–110)
CHOLEST SERPL-MCNC: 162 MG/DL (ref 0–199)
CHOLEST/HDLC SERPL: 2 {RATIO} (ref 0–5)
CO2 SERPL-SCNC: 35 MMOL/L (ref 20–32)
CREAT SERPL-MCNC: 0.82 MG/DL (ref 0.6–1.3)
GLOBULIN, CALCULATED - QUEST: 2.5 (ref 1.9–3.7)
GLUCOSE SERPL-MCNC: 104 MG/DL (ref 60–99)
HDLC SERPL-MCNC: 93 MG/DL (ref 40–150)
LDLC SERPL CALC-MCNC: 59 MG/DL (ref 0–130)
POTASSIUM SERPL-SCNC: 4.74 MMOL/L (ref 3.5–5.3)
PROT SERPL-MCNC: 6.3 G/DL (ref 6.1–8.1)
SODIUM SERPL-SCNC: 138.6 MMOL/L (ref 135–146)
TRIGL SERPL-MCNC: 49 MG/DL (ref 0–149)

## 2023-02-07 PROCEDURE — 80053 COMPREHEN METABOLIC PANEL: CPT | Performed by: FAMILY MEDICINE

## 2023-02-07 PROCEDURE — G0439 PPPS, SUBSEQ VISIT: HCPCS | Performed by: FAMILY MEDICINE

## 2023-02-07 PROCEDURE — 36415 COLL VENOUS BLD VENIPUNCTURE: CPT | Performed by: FAMILY MEDICINE

## 2023-02-07 PROCEDURE — 80061 LIPID PANEL: CPT | Performed by: FAMILY MEDICINE

## 2023-02-07 PROCEDURE — 99397 PER PM REEVAL EST PAT 65+ YR: CPT | Mod: 25 | Performed by: FAMILY MEDICINE

## 2023-02-07 RX ORDER — SIMVASTATIN 20 MG
20 TABLET ORAL AT BEDTIME
Qty: 90 TABLET | Refills: 3 | Status: SHIPPED | OUTPATIENT
Start: 2023-02-07

## 2023-02-07 RX ORDER — BUDESONIDE AND FORMOTEROL FUMARATE DIHYDRATE 160; 4.5 UG/1; UG/1
2 AEROSOL RESPIRATORY (INHALATION) 2 TIMES DAILY
Qty: 10.2 G | Refills: 1 | Status: SHIPPED | OUTPATIENT
Start: 2023-02-07

## 2023-02-07 RX ORDER — ATENOLOL 50 MG/1
50 TABLET ORAL DAILY
Qty: 90 TABLET | Refills: 3 | Status: SHIPPED | OUTPATIENT
Start: 2023-02-07 | End: 2023-05-26

## 2023-02-07 RX ORDER — AMLODIPINE BESYLATE 5 MG/1
5 TABLET ORAL DAILY
Qty: 90 TABLET | Refills: 3 | Status: SHIPPED | OUTPATIENT
Start: 2023-02-07 | End: 2023-05-26

## 2023-02-07 NOTE — PROGRESS NOTES
Roberto Carlos Still is a 78 year old male who presents for Medicare Annual Wellness Visit.    Current providers caring for this patient include:  Patient Care Team:  Leonard Palacios MD as PCP - General (Family Practice)  Leonard Palacios MD as Assigned PCP    Complete Medical and Social history reviewed with patient, outlined below.    Patient Active Problem List   Diagnosis     Coronary atherosclerosis     Essential hypertension, benign     ACP (advance care planning)     Health Care Home     COPD (chronic obstructive pulmonary disease) (H)     Mixed hyperlipidemia       Past Medical History:   Diagnosis Date     Coronary atherosclerosis of unspecified type of vessel, native or graft      Other and unspecified hyperlipidemia      Personal history of tobacco use, presenting hazards to health        Past Surgical History:   Procedure Laterality Date     EYE EXAM ESTABLISHED PT  12/06     HCL PSA, DIAGNOSTIC (TUMOR MARKER)  3/06    normal     REMOVAL OF SPERM DUCT(S)      Vasectomy     ZZC APPENDECTOMY       ZZC REPR ASD W BYPASS  5/97    One vessel angioplasty and stint     ZZHC COLONOSCOPY THRU STOMA, DIAGNOSTIC  6/05       Family History   Problem Relation Age of Onset     Alzheimer Disease Mother      Heart Disease Father      Cancer No family hx of      Diabetes No family hx of        Social History     Tobacco Use     Smoking status: Former     Packs/day: 0.50     Years: 35.00     Pack years: 17.50     Types: Cigarettes     Smokeless tobacco: Never   Substance Use Topics     Alcohol use: Yes     Alcohol/week: 11.7 standard drinks       Diet: regular, low salt/low fat  Physical Activity: generally inactive  Depression Screen:    Over the past 2 weeks, patient has felt down, depressed, or hopeless:  No    Over the past 2 weeks, patient has felt little interest or pleasure in doing things: No    Functional ability/Safety screen:  Up and go test (able to get up and walk longer than 30 seconds):  "MONITOR  Patient needs assistance with: nothing  Patient's home has the following possible safety concerns: none identified  Patient has concerns about his hearing:  No  Cognitive Screen  Patient repeats three objects (ball, flag, tree)      Clock drawing test:   NORMAL  Recalls three objects after 3 minutes (ball,flag,tree):                                                                                               recalls 2 objects (2 points)    Physical Exam:  Ht 1.702 m (5' 7\")   Wt 63 kg (139 lb)   BMI 21.77 kg/m     Body mass index is 21.77 kg/m .                                  End of Life Planning:   Patient currently has an advanced directive: Yes.  Practitioner is supportive of decision.    Education/Counseling:   Based on review of the above information, the following items were addressed:      Elevated blood pressure - follow-up plans made    Appropriate preventive services were discussed with this patient, including applicable screening as appropriate for cardiovascular disease, diabetes, osteopenia/osteoporosis, and glaucoma.  As appropriate for age/gender, discussed screening for colorectal cancer, prostate cancer, breast cancer, and cervical cancer.   Checklist reviewing preventive services available has been given to the patient.            3  SUBJECTIVE:   CC: Roberto Carlos Still is an 78 year old male who presents for preventive health visit.       Patient has been advised of split billing requirements and indicates understanding: Yes  Healthy Habits:    Do you get at least three servings of calcium containing foods daily (dairy, green leafy vegetables, etc.)? yes    Amount of exercise or daily activities, outside of work: a few day(s) per week-manly steps at home    Problems taking medications regularly No    Medication side effects: no     Have you had an eye exam in the past two years? yes    Do you see a dentist twice per year? yes    Do you have sleep apnea, excessive snoring or daytime " drowsiness?no          Today's PHQ-2 Score:   PHQ-2 ( 1999 [employer]) 2/7/2023 2/2/2022   Q1: Little interest or pleasure in doing things 0 0   Q2: Feeling down, depressed or hopeless 0 0   PHQ-2 Score 0 0   PHQ-2 Total Score (12-17 Years)- Positive if 3 or more points; Administer PHQ-A if positive - -       Abuse: Current or Past(Physical, Sexual or Emotional)- No  Do you feel safe in your environment? Yes        Social History     Tobacco Use     Smoking status: Former     Packs/day: 0.50     Years: 35.00     Pack years: 17.50     Types: Cigarettes     Smokeless tobacco: Never   Substance Use Topics     Alcohol use: Yes     Alcohol/week: 11.7 standard drinks     If you drink alcohol do you typically have >3 drinks per day or >7 drinks per week? Drinks 2 daily                      Last PSA:   [facility] PSA   Date Value Ref Range Status   02/11/2020 5.2 (H) < OR = 4.0 ng/mL Final     Comment:     The total PSA value from this assay system is   standardized against the WHO standard. The test   result will be approximately 20% lower when compared   to the equimolar-standardized total PSA ([place]   [place]). Comparison of serial PSA results should be   interpreted with this fact in mind.     This test was performed using the Siemens   chemiluminescent method. Values obtained from   different assay methods cannot be used  interchangeably. PSA levels, regardless of  value, should not be interpreted as absolute  evidence of the presence or absence of disease.         Reviewed orders with patient. Reviewed health maintenance and updated orders accordingly - Yes  BP Readings from Last 3 Encounters:   02/07/23 124/68   02/02/22 130/72   02/17/21 138/72    Wt Readings from Last 3 Encounters:   02/07/23 63 kg (139 lb)   02/02/22 66.3 kg (146 lb 3.2 oz)   02/17/21 68.6 kg (151 lb 3.2 oz)                  Patient Active Problem List   Diagnosis     Coronary atherosclerosis     Essential hypertension, benign     ACP (advance care  planning)     Health Care Home     COPD (chronic obstructive pulmonary disease) (H)     Mixed hyperlipidemia     Past Surgical History:   Procedure Laterality Date     EYE EXAM ESTABLISHED PT  12/06     HCL PSA, DIAGNOSTIC (TUMOR MARKER)  3/06    normal     REMOVAL OF SPERM DUCT(S)      Vasectomy     ZZC APPENDECTOMY       ZZC REPR ASD W BYPASS  5/97    One vessel angioplasty and stint     ZZHC COLONOSCOPY THRU STOMA, DIAGNOSTIC  6/05       Social History     Tobacco Use     Smoking status: Former     Packs/day: 0.50     Years: 35.00     Pack years: 17.50     Types: Cigarettes     Smokeless tobacco: Never   Substance Use Topics     Alcohol use: Yes     Alcohol/week: 11.7 standard drinks     Family History   Problem Relation Age of Onset     Alzheimer Disease Mother      Heart Disease Father      Cancer No family hx of      Diabetes No family hx of          Current Outpatient Medications   Medication Sig Dispense Refill     amLODIPine (NORVASC) 5 MG tablet Take 1 tablet (5 mg) by mouth daily 90 tablet 3     aspirin (ASA) 81 MG EC tablet Take 1 tablet (81 mg) by mouth daily       atenolol (TENORMIN) 50 MG tablet Take 1 tablet (50 mg) by mouth daily 90 tablet 3     budesonide-formoterol (SYMBICORT) 160-4.5 MCG/ACT Inhaler        simvastatin (ZOCOR) 20 MG tablet Take 1 tablet (20 mg) by mouth At Bedtime 90 tablet 3     umeclidinium (INCRUSE ELLIPTA) 62.5 MCG/INH inhaler Inhale 1 puff into the lungs daily       Allergies   Allergen Reactions     Cephalexin Rash     Recent Labs   Lab Test 02/02/22  0000 02/17/21  0000 02/27/20  0000 08/29/19  0000 03/14/19  0925 08/28/18  1040 02/27/18  1020   A1C  --  4.8  --   --   --   --   --    LDL 75 79 74   < > 82 64 74   HDL 89 100 93   < > 80 88 71   TRIG 47 59 62   < > 46 44 44   ALT  --   --   --   --  10 12 9   CR 0.82 0.80 0.80   < > 0.70 0.63* 0.61*   GFRESTIMATED  --   --   --   --  92 96 98   POTASSIUM 4.79 4.72 4.82   < > 4.4 4.8 5.0    < > = values in this interval not  "displayed.        Reviewed and updated as needed this visit by clinical staff   Tobacco  Allergies   Problems             Reviewed and updated as needed this visit by Provider                 Past Medical History:   Diagnosis Date     Coronary atherosclerosis of unspecified type of vessel, native or graft      Other and unspecified hyperlipidemia      Personal history of tobacco use, presenting hazards to health       Past Surgical History:   Procedure Laterality Date     EYE EXAM ESTABLISHED PT  12/06     HCL PSA, DIAGNOSTIC (TUMOR MARKER)  3/06    normal     REMOVAL OF SPERM DUCT(S)      Vasectomy     ZZC APPENDECTOMY       ZZC REPR ASD W BYPASS  5/97    One vessel angioplasty and stint     ZZ COLONOSCOPY THRU STOMA, DIAGNOSTIC  6/05       ROS:  CONSTITUTIONAL: NEGATIVE for fever, chills, change in weight  INTEGUMENTARY/SKIN: NEGATIVE for worrisome rashes, moles or lesions  EYES: NEGATIVE for vision changes or irritation  ENT: NEGATIVE for ear, mouth and throat problems  RESP: NEGATIVE for significant cough or SOB  CV: NEGATIVE for chest pain, palpitations or peripheral edema  GI: NEGATIVE for nausea, abdominal pain, heartburn, or change in bowel habits   male: negative for dysuria, hematuria, decreased urinary stream, erectile dysfunction, urethral discharge  MUSCULOSKELETAL: NEGATIVE for significant arthralgias or myalgia  NEURO: NEGATIVE for weakness, dizziness or paresthesias  PSYCHIATRIC: NEGATIVE for changes in mood or affect    OBJECTIVE:   /68 (BP Location: Right arm, Patient Position: Sitting, Cuff Size: Adult Regular)   Pulse 75   Temp 97.2  F (36.2  C) (Temporal)   Ht 1.702 m (5' 7\")   Wt 63 kg (139 lb)   SpO2 95%   BMI 21.77 kg/m    EXAM:  GENERAL: alert, no distress and elderly  EYES: Eyes grossly normal to inspection, PERRL and conjunctivae and sclerae normal  HENT: ear canals and TM's normal, nose and mouth without ulcers or lesions  NECK: no adenopathy, no asymmetry, masses, or " scars and thyroid normal to palpation  RESP: lungs clear to auscultation - no rales, rhonchi or wheezes  CV: regular rate and rhythm, normal S1 S2, no S3 or S4, no murmur, click or rub, no peripheral edema and peripheral pulses strong  ABDOMEN: soft, nontender, no hepatosplenomegaly, no masses and bowel sounds normal  MS: no gross musculoskeletal defects noted, no edema  SKIN: no suspicious lesions or rashes  NEURO: Normal strength and tone, mentation intact and speech normal  PSYCH: mentation appears normal, affect normal/bright    Diagnostic Test Results:  Labs reviewed in Epic    ASSESSMENT/PLAN:   (Z00.00) Routine general medical examination at a health care facility  (primary encounter diagnosis)  Comment: discussed preventitive healthcare   Plan: Continue to work on healthy diet and exercise, discussed healthy habits     (E78.2) Mixed hyperlipidemia  Comment: control uncertain  Plan: continue current medications at current doses pending labs    (I10) Essential hypertension, benign  Comment: well controlled  Plan: continue current medications at current doses     (I25.10) Atherosclerosis of native coronary artery of native heart without angina pectoris  Comment: stable symptomatically   Plan: continue current medications at current doses     (J43.9) Pulmonary emphysema, unspecified emphysema type (H)  Comment: control stable  Plan: continue current medications at current doses     (Z00.00) Encounter for annual wellness exam in Medicare patient  Comment: discussed preventitive healthcare   Plan: Continue to work on healthy diet and exercise, discussed healthy habits     Patient has been advised of split billing requirements and indicates understanding: Yes  COUNSELING:  Reviewed preventive health counseling, as reflected in patient instructions       Regular exercise       Healthy diet/nutrition       Vision screening       Immunizations    Recommend tetanus at pharmacy          Estimated body mass index is  "21.77 kg/m  as calculated from the following:    Height as of this encounter: 1.702 m (5' 7\").    Weight as of this encounter: 63 kg (139 lb).        He reports that he has quit smoking. His smoking use included cigarettes. He has a 17.50 pack-year smoking history. He has never used smokeless tobacco.      Counseling Resources:  ATP IV Guidelines  Pooled Cohorts Equation Calculator  FRAX Risk Assessment  ICSI Preventive Guidelines  Dietary Guidelines for Americans, 2010  USDA's MyPlate  ASA Prophylaxis  Lung CA Screening    Leonard Palacios MD  [facility]     "

## 2023-02-07 NOTE — PATIENT INSTRUCTIONS
Patient Education   Personalized Prevention Plan  You are due for the preventive services outlined below.  Your care team is available to assist you in scheduling these services.  If you have already completed any of these items, please share that information with your care team to update in your medical record.  Health Maintenance Due   Topic Date Due     COPD Action Plan  Never done     Annual Wellness Visit  01/04/2008     Colorectal Cancer Screening  04/19/2016     Diptheria Tetanus Pertussis (DTAP/TDAP/TD) Vaccine (3 - Td or Tdap) 07/02/2022     Comprehensive Metabolic Panel  08/02/2022     Cholesterol Lab  08/02/2022       Preventing Falls at Home  A person can fall for many reasons. Older adults may fall because reaction time slows down as we age. Your muscles and joints may get stiff, weak, or less flexible because of illness, medicines, or a physical condition.   Other health problems that make falls more likely include:     Arthritis    Dizziness or lightheadedness when you stand up (orthostatic hypotension)    History of a stroke    Dizziness    Anemia    Certain medicines taken for mental illness or to control blood pressure.    Problems with balance or gait    Bladder or urinary problems    History of falling    Changes in vision (vision impairment)    Changes in thinking skills and memory (cognitive impairment)  Injuries from a fall can include serious injuries such as broken bones, dislocated joints, internal bleeding and cuts. Injuries like these can limit your independence.   Prevention tips  To help prevent falls and fall-related injuries, follow the tips below.    Floors  To make floors safer:     Put nonskid pads under area rugs.    Remove small rugs.    Replace worn floor coverings.    Tack carpets firmly to each step on carpeted stairs. Put nonskid strips on the edges of uncarpeted stairs.    Keep floors and stairs free of clutter and cords.    Arrange furniture so there are clear  pathways.    Clean up any spills right away.  Bathrooms    To make bathrooms safer:     Install grab bars in the tub or shower.    Apply nonskid strips or put a nonskid rubber mat in the tub or shower.    Sit on a bath chair to bathe.    Use bathmats with nonskid backing.  Lighting  To improve visibility in your home:      Keep a flashlight in each room. Or put a lamp next to the bed within easy reach.    Put nightlights in the bedrooms, hallways, kitchen, and bathrooms.    Make sure all stairways have good lighting.    Take your time when going up and down stairs.    Put handrails on both sides of stairs and in walkways for more support. To prevent injury to your wrist or arm, don t use handrails to pull yourself up.    Install grab bars to pull yourself up.    Move or rearrange items that you use often. This will make them easier to find or reach.    Look at your home to find any safety hazards. Especially look at doorways, walkways, and the driveway. Remove or repair any safety problems that you find.  Other changes to make    Look around to find any safety hazards. Look closely at doorways, walkways, and the driveway. Remove or repair any safety problems that you find.    Wear shoes that fit well.    Take your time when going up and down stairs.    Put handrails on both sides of stairs and in walkways for more support. To prevent injury to your wrist or arm, don t use handrails to pull yourself up.    Install grab bars wherever needed to pull yourself up.    Arrange items that you use often. This will make them easier to find or reach.    [employer] last reviewed this educational content on 3/1/2020    0973-8404 [employer]. All rights reserved. This information is not intended as a substitute for professional medical care. Always follow your healthcare professional's instructions.

## 2023-05-25 ENCOUNTER — TELEPHONE (OUTPATIENT)
Dept: GERIATRICS | Facility: CLINIC | Age: 78
End: 2023-05-25
Payer: COMMERCIAL

## 2023-05-26 ENCOUNTER — TRANSITIONAL CARE UNIT VISIT (OUTPATIENT)
Dept: GERIATRICS | Facility: CLINIC | Age: 78
End: 2023-05-26
Payer: COMMERCIAL

## 2023-05-26 VITALS
HEART RATE: 69 BPM | SYSTOLIC BLOOD PRESSURE: 132 MMHG | TEMPERATURE: 97.9 F | WEIGHT: 138.89 LBS | RESPIRATION RATE: 16 BRPM | DIASTOLIC BLOOD PRESSURE: 66 MMHG | BODY MASS INDEX: 18.81 KG/M2 | HEIGHT: 72 IN | OXYGEN SATURATION: 97 %

## 2023-05-26 DIAGNOSIS — Z98.890 S/P AAA REPAIR: ICD-10-CM

## 2023-05-26 DIAGNOSIS — J44.9 CHRONIC OBSTRUCTIVE PULMONARY DISEASE, UNSPECIFIED COPD TYPE (H): ICD-10-CM

## 2023-05-26 DIAGNOSIS — D62 ANEMIA DUE TO BLOOD LOSS, ACUTE: ICD-10-CM

## 2023-05-26 DIAGNOSIS — I25.10 ATHEROSCLEROSIS OF NATIVE CORONARY ARTERY OF NATIVE HEART WITHOUT ANGINA PECTORIS: ICD-10-CM

## 2023-05-26 DIAGNOSIS — R53.81 PHYSICAL DECONDITIONING: ICD-10-CM

## 2023-05-26 DIAGNOSIS — I10 ESSENTIAL HYPERTENSION, BENIGN: ICD-10-CM

## 2023-05-26 DIAGNOSIS — K59.03 DRUG-INDUCED CONSTIPATION: ICD-10-CM

## 2023-05-26 DIAGNOSIS — I71.40 ABDOMINAL AORTIC ANEURYSM (AAA) WITHOUT RUPTURE, UNSPECIFIED PART (H): Primary | ICD-10-CM

## 2023-05-26 DIAGNOSIS — R60.0 LOCALIZED EDEMA: ICD-10-CM

## 2023-05-26 DIAGNOSIS — Z86.79 S/P AAA REPAIR: ICD-10-CM

## 2023-05-26 PROCEDURE — 99310 SBSQ NF CARE HIGH MDM 45: CPT | Performed by: NURSE PRACTITIONER

## 2023-05-26 RX ORDER — ACETAMINOPHEN 325 MG/1
650 TABLET ORAL EVERY 4 HOURS PRN
COMMUNITY

## 2023-05-26 RX ORDER — POLYETHYLENE GLYCOL 3350 17 G/17G
17 POWDER, FOR SOLUTION ORAL DAILY
COMMUNITY

## 2023-05-26 RX ORDER — SENNOSIDES A AND B 8.6 MG/1
1 TABLET, FILM COATED ORAL DAILY
COMMUNITY

## 2023-05-26 RX ORDER — FUROSEMIDE 20 MG
20 TABLET ORAL DAILY
COMMUNITY
End: 2023-06-19

## 2023-05-26 RX ORDER — BISOPROLOL FUMARATE 5 MG/1
5 TABLET, FILM COATED ORAL DAILY
COMMUNITY
End: 2023-06-19

## 2023-05-26 RX ORDER — IPRATROPIUM BROMIDE AND ALBUTEROL 20; 100 UG/1; UG/1
1 SPRAY, METERED RESPIRATORY (INHALATION) 4 TIMES DAILY PRN
COMMUNITY

## 2023-05-26 NOTE — PROGRESS NOTES
[facility] GERIATRICS    PRIMARY CARE PROVIDER AND CLINIC:  Leonard Palacios MD, [address], [id number] / [facility] [id number]  Chief Complaint   Patient presents with     Hospital F/U      [place] Medical Record Number:  [id number]  Place of Service where encounter took place:  [facility] ([facility]) [25]    Roberto Carlos Still  is a 78 year old  (1945), admitted to the above facility from  [facility] . Hospital stay 5/15/23 through 5/25/23..   HPI:    PMH of HTN, HLD, COPD on home O2 with tobacco abuse who presents after a fall.   AAA noted 7cm incidentally during trauma assessment: s/p AAA repair on 5/19/23  Anemia: Hgb 12--> 8.3 no transfusion required  LE edema: started on lasix  COPD: f/u with pulmonology as OP  BP soft post op: DC amlodipine and atenolol and started on bisoprolol and lasix.   On exam today: patient sitting up in w/c, alert, pleasant, denies pain or discomfort, he is on O2 at 2liters which he was on at home, denies increased SOB, cough or congestion, N/V/D or constipation, states he slept well last night.     CODE STATUS/ADVANCE DIRECTIVES DISCUSSION:  No Order  DNR / DNI  ALLERGIES:   Allergies   Allergen Reactions     Cephalexin Rash      PAST MEDICAL HISTORY:   Past Medical History:   Diagnosis Date     Coronary atherosclerosis of unspecified type of vessel, native or graft      Other and unspecified hyperlipidemia      Personal history of tobacco use, presenting hazards to health       PAST SURGICAL HISTORY:   has a past surgical history that includes REPR ASD W BYPASS (5/97); APPENDECTOMY; eye exam established pt (12/06); PSA, DIAGNOSTIC (TUMOR MARKER) (3/06); removal of sperm duct(s); and COLONOSCOPY THRU STOMA, DIAGNOSTIC (6/05).  FAMILY HISTORY: family history includes Alzheimer Disease in his mother; Heart Disease in his father.  SOCIAL HISTORY:   reports that he has quit smoking. His smoking use included cigarettes. He has a 17.50 pack-year smoking  history. He has never used smokeless tobacco. He reports current alcohol use of about 11.7 standard drinks of alcohol per week. He reports that he does not use drugs.  Patient's living condition: lives with spouse    Post Discharge Medication Reconciliation Status:   MED REC REQUIRED  Post Medication Reconciliation Status: discharge medications reconciled and changed, per note/orders       Current Outpatient Medications   Medication Sig     acetaminophen (TYLENOL) 325 MG tablet Take 650 mg by mouth every 4 hours as needed for mild pain     aspirin (ASA) 81 MG EC tablet Take 1 tablet (81 mg) by mouth daily     bisoprolol (ZEBETA) 5 MG tablet Take 5 mg by mouth daily     budesonide-formoterol (SYMBICORT) 160-4.5 MCG/ACT Inhaler Inhale 2 puffs into the lungs 2 times daily     furosemide (LASIX) 20 MG tablet Take 20 mg by mouth daily     ipratropium-albuterol (COMBIVENT RESPIMAT)  MCG/ACT inhaler Inhale 1 puff into the lungs 4 times daily as needed for shortness of breath     polyethylene glycol (MIRALAX) 17 g packet Take 17 g by mouth daily     senna (SENOKOT) 8.6 MG tablet Take 1 tablet by mouth daily     simvastatin (ZOCOR) 20 MG tablet Take 1 tablet (20 mg) by mouth At Bedtime     umeclidinium (INCRUSE ELLIPTA) 62.5 MCG/ACT inhaler Inhale 1 puff into the lungs daily     No current facility-administered medications for this visit.       ROS:  10 point ROS of systems including Constitutional, Eyes, Respiratory, Cardiovascular, Gastroenterology, Genitourinary, Integumentary, Musculoskeletal, Psychiatric were all negative except for pertinent positives noted in my HPI.    Vitals:  /66   Pulse 69   Temp 97.9  F (36.6  C)   Resp 16   Ht 1.829 m (6')   Wt 63 kg (138 lb 14.2 oz)   SpO2 97%   BMI 18.84 kg/m    Exam:  GENERAL APPEARANCE:  Alert, in no distress  ENT:  Mouth and posterior oropharynx normal, moist mucous membranes, [place]  EYES:  EOM, conjunctivae, lids, pupils and irises normal, PERRL  RESP:   respiratory effort and palpation of chest normal, lungs clear to auscultation , no respiratory distress, diminished breath sounds bases bilaterally  CV:  Palpation and auscultation of heart done , regular rate and rhythm, no murmur, rub, or gallop, peripheral edema 1+ in LE bilaterally  ABDOMEN:  normal bowel sounds, soft, nontender, no hepatosplenomegaly or other masses  M/S:   patient sitting up in w/c  SKIN:  Inspection of skin and subcutaneous tissue baseline  NEURO:   speech wnl  PSYCH:  affect and mood normal    Lab/Diagnostic data:  Recent labs in [facility] reviewed by me today.  and   Most Recent 3 CBC's:No lab results found.  Most Recent 3 BMP's:  Recent Labs   Lab Test 02/07/23  0000 02/02/22  0000 02/17/21  0000   .6 138.8 141.7   POTASSIUM 4.74 4.79 4.72   CHLORIDE 96.3* 98.4 99.8   CO2 35.0* 33.3* 32.8*   BUN 12  14.6 10  12.2 10  12.5   CR 0.82 0.82 0.80   MARGE 9.4 9.7 9.8   * 113* 127*       ASSESSMENT/PLAN:    (I71.40) Abdominal aortic aneurysm (AAA) without rupture, unspecified part (H)  (primary encounter diagnosis)  (Z98.890,  Z86.79) S/P AAA repair  (R53.81) Physical deconditioning  Comment: acute/ongoing  Plan: PT and OT, tylenol 650mg q 4 hours prn  F/u with vascular surgery as directed    (D62) Anemia due to blood loss, acute  Comment: acute/ongoing  Hgb 8.4 at discharge  Plan: Hgb on Tuesday    (I25.10) Atherosclerosis of native coronary artery of native heart without angina pectoris  (I10) Essential hypertension, benign  (R60.0) Localized edema  Comment: acute/ongoing  Plan: Bmp follow, continue Lasix 20mg QD, ASA 81mg QD, Bisoprolol 5mg QD      (J44.9) Chronic obstructive pulmonary disease, unspecified COPD type (H)  Comment: ongoing  Plan: monitor SaO2 at rest and with activity, continue incruse ellipta inhaler 62.5mcg/act 1 puff daily, symbicort 160/4.5mcg 2 puffs BID, combivent 1 puff QID prn  F/u with pulmonology as directed    (K59.03) Drug-induced constipation  Comment:  acute/ongoing  Plan: continue senokot 8.6mg QD, miralax 17g QD    Orders:  CBC and BMP on Tuesday      Total time spent with patient visit at the skilled nursing facility was 45 min including patient visit and review of past records. Greater than 50% of total time spent with counseling and coordinating care due to reviewed internal medicine notes, operative report, laboratory finding, medications and changes during hospitalization, discussed medications and hospitalization with patient    Electronically signed by:  Tonya Lynn Haase, APRN CNP

## 2023-05-26 NOTE — LETTER
5/26/2023        RE: Roberto Carlos Still  [address] [facility] [address]        M [facility] GERIATRICS    PRIMARY CARE PROVIDER AND CLINIC:  Leonard Palacios MD, [address], [facility] [address]  Chief Complaint   Patient presents with     Hospital F/U      [place] Medical Record Number:  [id number]  Place of Service where encounter took place:  [facility] [25]    Roberto Carlos Still  is a 78 year old  (1945), admitted to the above facility from  [facility] . Hospital stay 5/15/23 through 5/25/23..   HPI:    PMH of HTN, HLD, COPD on home O2 with tobacco abuse who presents after a fall.   AAA noted 7cm incidentally during trauma assessment: s/p AAA repair on 5/19/23  Anemia: Hgb 12--> 8.3 no transfusion required  LE edema: started on lasix  COPD: f/u with pulmonology as OP  BP soft post op: DC amlodipine and atenolol and started on bisoprolol and lasix.   On exam today: patient sitting up in w/c, alert, pleasant, denies pain or discomfort, he is on O2 at 2liters which he was on at home, denies increased SOB, cough or congestion, N/V/D or constipation, states he slept well last night.     CODE STATUS/ADVANCE DIRECTIVES DISCUSSION:  No Order  DNR / DNI  ALLERGIES:   Allergies   Allergen Reactions     Cephalexin Rash      PAST MEDICAL HISTORY:   Past Medical History:   Diagnosis Date     Coronary atherosclerosis of unspecified type of vessel, native or graft      Other and unspecified hyperlipidemia      Personal history of tobacco use, presenting hazards to health       PAST SURGICAL HISTORY:   has a past surgical history that includes REPR ASD W BYPASS (5/97); APPENDECTOMY; eye exam established pt (12/06); PSA, DIAGNOSTIC (TUMOR MARKER) (3/06); removal of sperm duct(s); and COLONOSCOPY THRU STOMA, DIAGNOSTIC (6/05).  FAMILY HISTORY: family history includes Alzheimer Disease in his mother; Heart Disease in his father.  SOCIAL HISTORY:    reports that he has quit smoking. His smoking use included cigarettes. He has a 17.50 pack-year smoking history. He has never used smokeless tobacco. He reports current alcohol use of about 11.7 standard drinks of alcohol per week. He reports that he does not use drugs.  Patient's living condition: lives with spouse    Post Discharge Medication Reconciliation Status:   MED REC REQUIRED  Post Medication Reconciliation Status: discharge medications reconciled and changed, per note/orders       Current Outpatient Medications   Medication Sig     acetaminophen (TYLENOL) 325 MG tablet Take 650 mg by mouth every 4 hours as needed for mild pain     aspirin (ASA) 81 MG EC tablet Take 1 tablet (81 mg) by mouth daily     bisoprolol (ZEBETA) 5 MG tablet Take 5 mg by mouth daily     budesonide-formoterol (SYMBICORT) 160-4.5 MCG/ACT Inhaler Inhale 2 puffs into the lungs 2 times daily     furosemide (LASIX) 20 MG tablet Take 20 mg by mouth daily     ipratropium-albuterol (COMBIVENT RESPIMAT)  MCG/ACT inhaler Inhale 1 puff into the lungs 4 times daily as needed for shortness of breath     polyethylene glycol (MIRALAX) 17 g packet Take 17 g by mouth daily     senna (SENOKOT) 8.6 MG tablet Take 1 tablet by mouth daily     simvastatin (ZOCOR) 20 MG tablet Take 1 tablet (20 mg) by mouth At Bedtime     umeclidinium (INCRUSE ELLIPTA) 62.5 MCG/ACT inhaler Inhale 1 puff into the lungs daily     amLODIPine (NORVASC) 5 MG tablet Take 1 tablet (5 mg) by mouth daily     atenolol (TENORMIN) 50 MG tablet Take 1 tablet (50 mg) by mouth daily     umeclidinium (INCRUSE ELLIPTA) 62.5 MCG/INH inhaler Inhale 1 puff into the lungs daily     No current facility-administered medications for this visit.       ROS:  10 point ROS of systems including Constitutional, Eyes, Respiratory, Cardiovascular, Gastroenterology, Genitourinary, Integumentary, Musculoskeletal, Psychiatric were all negative except for pertinent positives noted in my  HPI.    Vitals:  /66   Pulse 69   Temp 97.9  F (36.6  C)   Resp 16   Ht 1.829 m (6')   Wt 63 kg (138 lb 14.2 oz)   SpO2 97%   BMI 18.84 kg/m    Exam:  GENERAL APPEARANCE:  Alert, in no distress  ENT:  Mouth and posterior oropharynx normal, moist mucous membranes, [place]  EYES:  EOM, conjunctivae, lids, pupils and irises normal, PERRL  RESP:  respiratory effort and palpation of chest normal, lungs clear to auscultation , no respiratory distress, diminished breath sounds bases bilaterally  CV:  Palpation and auscultation of heart done , regular rate and rhythm, no murmur, rub, or gallop, peripheral edema 1+ in LE bilaterally  ABDOMEN:  normal bowel sounds, soft, nontender, no hepatosplenomegaly or other masses  M/S:   patient sitting up in w/c  SKIN:  Inspection of skin and subcutaneous tissue baseline  NEURO:   speech wnl  PSYCH:  affect and mood normal    Lab/Diagnostic data:  Recent labs in [facility] reviewed by me today.  and   Most Recent 3 CBC's:No lab results found.  Most Recent 3 BMP's:Recent Labs   Lab Test 02/07/23  0000 02/02/22  0000 02/17/21  0000   .6 138.8 141.7   POTASSIUM 4.74 4.79 4.72   CHLORIDE 96.3* 98.4 99.8   CO2 35.0* 33.3* 32.8*   BUN 12  14.6 10  12.2 10  12.5   CR 0.82 0.82 0.80   MARGE 9.4 9.7 9.8   * 113* 127*       ASSESSMENT/PLAN:    (I71.40) Abdominal aortic aneurysm (AAA) without rupture, unspecified part (H)  (primary encounter diagnosis)  (Z98.890,  Z86.79) S/P AAA repair  (R53.81) Physical deconditioning  Comment: acute/ongoing  Plan: PT and OT, tylenol 650mg q 4 hours prn  F/u with vascular surgery as directed    (D62) Anemia due to blood loss, acute  Comment: acute/ongoing  Hgb 8.4 at discharge  Plan: Hgb on Tuesday    (I25.10) Atherosclerosis of native coronary artery of native heart without angina pectoris  (I10) Essential hypertension, benign  (R60.0) Localized edema  Comment: acute/ongoing  Plan: Bmp follow, continue Lasix 20mg QD, ASA 81mg QD, Bisoprolol  5mg QD      (J44.9) Chronic obstructive pulmonary disease, unspecified COPD type (H)  Comment: ongoing  Plan: monitor SaO2 at rest and with activity, continue incruse ellipta inhaler 62.5mcg/act 1 puff daily, symbicort 160/4.5mcg 2 puffs BID, combivent 1 puff QID prn  F/u with pulmonology as directed    (K59.03) Drug-induced constipation  Comment: acute/ongoing  Plan: continue senokot 8.6mg QD, miralax 17g QD    Orders:  {fgsorders:[id number]}  ***    Total time spent with patient visit at the skilled nursing facility was {1/2/3/4/5:425932} including {1/2/3/4/5:748722}. Greater than 50% of total time spent with counseling and coordinating care due to ***.     Electronically signed by:  Naomy Gordillo CNA ***                      Sincerely,        Tonya Lynn Haase, APRN CNP

## 2023-05-30 ENCOUNTER — TRANSITIONAL CARE UNIT VISIT (OUTPATIENT)
Dept: GERIATRICS | Facility: CLINIC | Age: 78
End: 2023-05-30
Payer: COMMERCIAL

## 2023-05-30 ENCOUNTER — DOCUMENTATION ONLY (OUTPATIENT)
Dept: GERIATRICS | Facility: CLINIC | Age: 78
End: 2023-05-30

## 2023-05-30 VITALS
SYSTOLIC BLOOD PRESSURE: 126 MMHG | TEMPERATURE: 98.1 F | OXYGEN SATURATION: 92 % | DIASTOLIC BLOOD PRESSURE: 78 MMHG | RESPIRATION RATE: 18 BRPM | WEIGHT: 125.9 LBS | HEIGHT: 70 IN | BODY MASS INDEX: 18.02 KG/M2 | HEART RATE: 72 BPM

## 2023-05-30 DIAGNOSIS — K59.03 DRUG-INDUCED CONSTIPATION: ICD-10-CM

## 2023-05-30 DIAGNOSIS — I25.10 ATHEROSCLEROSIS OF NATIVE CORONARY ARTERY OF NATIVE HEART WITHOUT ANGINA PECTORIS: ICD-10-CM

## 2023-05-30 DIAGNOSIS — D62 ANEMIA DUE TO BLOOD LOSS, ACUTE: ICD-10-CM

## 2023-05-30 DIAGNOSIS — Z86.79 S/P AAA REPAIR: ICD-10-CM

## 2023-05-30 DIAGNOSIS — R53.81 PHYSICAL DECONDITIONING: ICD-10-CM

## 2023-05-30 DIAGNOSIS — J44.9 CHRONIC OBSTRUCTIVE PULMONARY DISEASE, UNSPECIFIED COPD TYPE (H): ICD-10-CM

## 2023-05-30 DIAGNOSIS — I71.40 ABDOMINAL AORTIC ANEURYSM (AAA) WITHOUT RUPTURE, UNSPECIFIED PART (H): Primary | ICD-10-CM

## 2023-05-30 DIAGNOSIS — R60.0 LOCALIZED EDEMA: ICD-10-CM

## 2023-05-30 DIAGNOSIS — I10 ESSENTIAL HYPERTENSION, BENIGN: ICD-10-CM

## 2023-05-30 DIAGNOSIS — Z98.890 S/P AAA REPAIR: ICD-10-CM

## 2023-05-30 PROCEDURE — 99310 SBSQ NF CARE HIGH MDM 45: CPT | Performed by: NURSE PRACTITIONER

## 2023-05-30 NOTE — PROGRESS NOTES
"[facility] GERIATRICS    Chief Complaint   Patient presents with     RECHECK     HPI:  Roberto Carlos Still is a 78 year old  (1945), who is being seen today for an episodic care visit at: [facility]) [25]. Today's concern is:   AAA s/p repair: on exam today patient is resting in bed, denies pain or discomfort, in therapy patient is walking up to 12 feet using a RW with CGA  CAD/HTN: /78, 120/68, 118/62 with HR 70 range, denies CP, palpitations, SOB weight on admission was 131lbs and current weight is 125.9lbs  Constipation: denies constipation, states bowels are working  Anemia: see labs    Allergies, and PMH/PSH reviewed in [facility] today.  REVIEW OF SYSTEMS:  10 point ROS of systems including Constitutional, Eyes, Respiratory, Cardiovascular, Gastroenterology, Genitourinary, Integumentary, Musculoskeletal, Psychiatric were all negative except for pertinent positives noted in my HPI.    Objective:   /78   Pulse 72   Temp 98.1  F (36.7  C)   Resp 18   Ht 1.778 m (5' 10\")   Wt 57.1 kg (125 lb 14.4 oz)   SpO2 92%   BMI 18.06 kg/m    GENERAL APPEARANCE:  Alert, in no distress  ENT:  Mouth and posterior oropharynx normal, moist mucous membranes, normal hearing acuity  EYES:  EOM, conjunctivae, lids, pupils and irises normal, PERRL  RESP:  respiratory effort and palpation of chest normal, lungs clear to auscultation , no respiratory distress  CV:  Palpation and auscultation of heart done , regular rate and rhythm, no murmur, rub, or gallop, peripheral edema trace+ in LE bilaterally  ABDOMEN:  normal bowel sounds, soft, nontender, no hepatosplenomegaly or other masses  M/S:   patient resting in bed  SKIN:  Inspection of skin and subcutaneous tissue baseline, did not visualize surgical incision  NEURO:   speech wnl  PSYCH:  affect and mood normal    Recent labs in [facility] reviewed by me today.  and   Most Recent 3 CBC's:No lab results found.  Most Recent 3 BMP's:Recent Labs   Lab Test " 02/07/23  0000 02/02/22  0000 02/17/21  0000   .6 138.8 141.7   POTASSIUM 4.74 4.79 4.72   CHLORIDE 96.3* 98.4 99.8   CO2 35.0* 33.3* 32.8*   BUN 12  14.6 10  12.2 10  12.5   CR 0.82 0.82 0.80   MARGE 9.4 9.7 9.8   * 113* 127*       Assessment/Plan:     (I71.40) Abdominal aortic aneurysm (AAA) without rupture, unspecified part (H)  (primary encounter diagnosis)  (Z98.890,  Z86.79) S/P AAA repair  (R53.81) Physical deconditioning  Comment: acute/ongoing, no change  Plan: PT and OT, tylenol 650mg q 4 hours prn  F/u with vascular surgery as directed     (D62) Anemia due to blood loss, acute  Comment: acute/ongoing, no change  Hgb 8.4 at discharge  Plan: Hgb pending for today     (I25.10) Atherosclerosis of native coronary artery of native heart without angina pectoris  (I10) Essential hypertension, benign  (R60.0) Localized edema  Comment: acute/ongoing, no change  Plan: Bmp follow, continue Lasix 20mg QD, ASA 81mg QD, Bisoprolol 5mg QD        (J44.9) Chronic obstructive pulmonary disease, unspecified COPD type (H)  Comment: ongoing, no change  Plan: monitor SaO2 at rest and with activity, continue incruse ellipta inhaler 62.5mcg/act 1 puff daily, symbicort 160/4.5mcg 2 puffs BID, combivent 1 puff QID prn  F/u with pulmonology as directed     (K59.03) Drug-induced constipation  Comment: acute/ongoing, no change  Plan: continue senokot 8.6mg QD, miralax 17g QD       MED REC REQUIRED  Post Medication Reconciliation Status: medication reconcilation previously completed during another office visit      Orders:  No new orders      Electronically signed by: Tonya Lynn Haase, JACKY CNP

## 2023-05-30 NOTE — LETTER
"    5/30/2023        RE: Roberto Carlos Still  [address] [facility] [address]        [facility]    Chief Complaint   Patient presents with     RECHECK     HPI:  Roberto Carlos Still is a 78 year old  (1945), who is being seen today for an episodic care visit at: [facility]) [25]. Today's concern is:   AAA s/p repair: on exam today patient is resting in bed, denies pain or discomfort, in therapy patient is walking up to 12 feet using a RW with CGA  CAD/HTN: /78, 120/68, 118/62 with HR 70 range, denies CP, palpitations, SOB weight on admission was 131lbs and current weight is 125.9lbs  Constipation: denies constipation, states bowels are working  Anemia: see labs    Allergies, and PMH/PSH reviewed in [facility] today.  REVIEW OF SYSTEMS:  10 point ROS of systems including Constitutional, Eyes, Respiratory, Cardiovascular, Gastroenterology, Genitourinary, Integumentary, Musculoskeletal, Psychiatric were all negative except for pertinent positives noted in my HPI.    Objective:   /78   Pulse 72   Temp 98.1  F (36.7  C)   Resp 18   Ht 1.778 m (5' 10\")   Wt 57.1 kg (125 lb 14.4 oz)   SpO2 92%   BMI 18.06 kg/m    GENERAL APPEARANCE:  Alert, in no distress  ENT:  Mouth and posterior oropharynx normal, moist mucous membranes, normal hearing acuity  EYES:  EOM, conjunctivae, lids, pupils and irises normal, PERRL  RESP:  respiratory effort and palpation of chest normal, lungs clear to auscultation , no respiratory distress  CV:  Palpation and auscultation of heart done , regular rate and rhythm, no murmur, rub, or gallop, peripheral edema trace+ in LE bilaterally  ABDOMEN:  normal bowel sounds, soft, nontender, no hepatosplenomegaly or other masses  M/S:   patient resting in bed  SKIN:  Inspection of skin and subcutaneous tissue baseline, did not visualize surgical incision  NEURO:   speech wnl  PSYCH:  affect and mood normal    Recent labs in [facility] reviewed by me today.  " and   Most Recent 3 CBC's:No lab results found.  Most Recent 3 BMP's:Recent Labs   Lab Test 02/07/23  0000 02/02/22  0000 02/17/21  0000   .6 138.8 141.7   POTASSIUM 4.74 4.79 4.72   CHLORIDE 96.3* 98.4 99.8   CO2 35.0* 33.3* 32.8*   BUN 12  14.6 10  12.2 10  12.5   CR 0.82 0.82 0.80   MARGE 9.4 9.7 9.8   * 113* 127*       Assessment/Plan:     (I71.40) Abdominal aortic aneurysm (AAA) without rupture, unspecified part (H)  (primary encounter diagnosis)  (Z98.890,  Z86.79) S/P AAA repair  (R53.81) Physical deconditioning  Comment: acute/ongoing, no change  Plan: PT and OT, tylenol 650mg q 4 hours prn  F/u with vascular surgery as directed     (D62) Anemia due to blood loss, acute  Comment: acute/ongoing, no change  Hgb 8.4 at discharge  Plan: Hgb pending for today     (I25.10) Atherosclerosis of native coronary artery of native heart without angina pectoris  (I10) Essential hypertension, benign  (R60.0) Localized edema  Comment: acute/ongoing, no change  Plan: Bmp follow, continue Lasix 20mg QD, ASA 81mg QD, Bisoprolol 5mg QD        (J44.9) Chronic obstructive pulmonary disease, unspecified COPD type (H)  Comment: ongoing, no change  Plan: monitor SaO2 at rest and with activity, continue incruse ellipta inhaler 62.5mcg/act 1 puff daily, symbicort 160/4.5mcg 2 puffs BID, combivent 1 puff QID prn  F/u with pulmonology as directed     (K59.03) Drug-induced constipation  Comment: acute/ongoing, no change  Plan: continue senokot 8.6mg QD, miralax 17g QD       MED REC REQUIRED  Post Medication Reconciliation Status: medication reconcilation previously completed during another office visit      Orders:  No new orders      Electronically signed by: Tonya Lynn Haase, APRN CNP             Sincerely,        Tonya Lynn Haase, APRN CNP

## 2023-06-01 ENCOUNTER — TRANSITIONAL CARE UNIT VISIT (OUTPATIENT)
Dept: GERIATRICS | Facility: CLINIC | Age: 78
End: 2023-06-01
Payer: COMMERCIAL

## 2023-06-01 VITALS
BODY MASS INDEX: 17.95 KG/M2 | WEIGHT: 125.4 LBS | SYSTOLIC BLOOD PRESSURE: 126 MMHG | HEIGHT: 70 IN | RESPIRATION RATE: 18 BRPM | TEMPERATURE: 97.5 F | OXYGEN SATURATION: 98 % | HEART RATE: 66 BPM | DIASTOLIC BLOOD PRESSURE: 78 MMHG

## 2023-06-01 DIAGNOSIS — I71.40 ABDOMINAL AORTIC ANEURYSM (AAA) WITHOUT RUPTURE, UNSPECIFIED PART (H): ICD-10-CM

## 2023-06-01 DIAGNOSIS — D62 ANEMIA DUE TO BLOOD LOSS, ACUTE: ICD-10-CM

## 2023-06-01 DIAGNOSIS — I25.10 ATHEROSCLEROSIS OF NATIVE CORONARY ARTERY OF NATIVE HEART WITHOUT ANGINA PECTORIS: ICD-10-CM

## 2023-06-01 DIAGNOSIS — I10 ESSENTIAL HYPERTENSION, BENIGN: ICD-10-CM

## 2023-06-01 DIAGNOSIS — J44.9 CHRONIC OBSTRUCTIVE PULMONARY DISEASE, UNSPECIFIED COPD TYPE (H): Primary | ICD-10-CM

## 2023-06-01 DIAGNOSIS — Z86.79 S/P AAA REPAIR: ICD-10-CM

## 2023-06-01 DIAGNOSIS — R60.0 LOCALIZED EDEMA: ICD-10-CM

## 2023-06-01 DIAGNOSIS — K59.03 DRUG-INDUCED CONSTIPATION: ICD-10-CM

## 2023-06-01 DIAGNOSIS — R53.81 PHYSICAL DECONDITIONING: ICD-10-CM

## 2023-06-01 DIAGNOSIS — Z98.890 S/P AAA REPAIR: ICD-10-CM

## 2023-06-01 PROCEDURE — 99310 SBSQ NF CARE HIGH MDM 45: CPT | Performed by: NURSE PRACTITIONER

## 2023-06-01 NOTE — LETTER
"    6/1/2023        RE: Roberto Carlos Still  [address] [address] [facility] [address]        [facility]    Chief Complaint   Patient presents with     RECHECK     HPI:  Roberto Carlos Still is a 78 year old  (1945), who is being seen today for an episodic care visit at: [facility]) [25]. Today's concern is:   AAA s/p repair: on exam today patient is resting in bed, denies pain or discomfort, in therapy patient is walking up to  [id number] using a RW with CGA, max assist with ADL's  CAD/HTN: /78, 111/73, 126/78 with HR 60-70 range, denies CP, palpitations, SOB weight on admission was 131lbs and current weight is 125.4lbs  Constipation: denies constipation, states bowels are working  Anemia: Hgb 8.1 on 5/30/23    Allergies, and PMH/PSH reviewed in [facility] today.  REVIEW OF SYSTEMS:  10 point ROS of systems including Constitutional, Eyes, Respiratory, Cardiovascular, Gastroenterology, Genitourinary, Integumentary, Musculoskeletal, Psychiatric were all negative except for pertinent positives noted in my HPI.    Objective:   /78   Pulse 66   Temp 97.5  F (36.4  C)   Resp 18   Ht 1.778 m (5' 10\")   Wt 56.9 kg (125 lb 6.4 oz)   SpO2 98%   BMI 17.99 kg/m    GENERAL APPEARANCE:  Alert, in no distress  ENT:  Mouth and posterior oropharynx normal, moist mucous membranes, [place]  EYES:  EOM, conjunctivae, lids, pupils and irises normal, PERRL  RESP:  respiratory effort and palpation of chest normal, lungs clear to auscultation , no respiratory distress  CV:  Palpation and auscultation of heart done , regular rate and rhythm, no murmur, rub, or gallop, no edema  ABDOMEN:  normal bowel sounds, soft, nontender, no hepatosplenomegaly or other masses  M/S:   patient resting in bed  SKIN:  Inspection of skin and subcutaneous tissue baseline  NEURO:   speech wnl  PSYCH:  affect and mood normal    Recent labs in [facility] reviewed by me today.  and   Most Recent 3 CBC's:No lab results " found.  Most Recent 3 BMP's:Recent Labs   Lab Test 02/07/23  0000 02/02/22  0000 02/17/21  0000   .6 138.8 141.7   POTASSIUM 4.74 4.79 4.72   CHLORIDE 96.3* 98.4 99.8   CO2 35.0* 33.3* 32.8*   BUN 12  14.6 10  12.2 10  12.5   CR 0.82 0.82 0.80   MARGE 9.4 9.7 9.8   * 113* 127*       Assessment/Plan:  (I71.40) Abdominal aortic aneurysm (AAA) without rupture, unspecified part (H)  (primary encounter diagnosis)  (Z98.890,  Z86.79) S/P AAA repair  (R53.81) Physical deconditioning  Comment: acute/ongoing, no change  Plan: PT and OT, tylenol 650mg q 4 hours prn  F/u with vascular surgery as directed     (D62) Anemia due to blood loss, acute  Comment: acute/ongoing, no change  Hgb 8.4 at discharge  Plan: Hgb 8.1 on 5/30/23, follow Hgb     (I25.10) Atherosclerosis of native coronary artery of native heart without angina pectoris  (I10) Essential hypertension, benign  (R60.0) Localized edema  Comment: acute/ongoing, no change  Plan: Bmp follow, continue Lasix 20mg QD, ASA 81mg QD, Bisoprolol 5mg QD        (J44.9) Chronic obstructive pulmonary disease, unspecified COPD type (H)  Comment: ongoing, no change  Plan: monitor SaO2 at rest and with activity, continue incruse ellipta inhaler 62.5mcg/act 1 puff daily, symbicort 160/4.5mcg 2 puffs BID, combivent 1 puff QID prn  F/u with pulmonology as directed     (K59.03) Drug-induced constipation  Comment: acute/ongoing, no change  Plan: continue senokot 8.6mg QD, miralax 17g QD    MED REC REQUIRED  Post Medication Reconciliation Status: medication reconcilation previously completed during another office visit      Orders:  No new orders      Electronically signed by: Tonya Lynn Haase, APRN CNP             Sincerely,        Tonya Lynn Haase, APRN CNP

## 2023-06-01 NOTE — PROGRESS NOTES
"[facility] GERIATRICS    Chief Complaint   Patient presents with     RECHECK     HPI:  Roberto Carlos Still is a 78 year old  (1945), who is being seen today for an episodic care visit at: [facility]) [25]. Today's concern is:   AAA s/p repair: on exam today patient is resting in bed, denies pain or discomfort, in therapy patient is walking up to  [id number] using a RW with CGA, max assist with ADL's  CAD/HTN: /78, 111/73, 126/78 with HR 60-70 range, denies CP, palpitations, SOB weight on admission was 131lbs and current weight is 125.4lbs  Constipation: denies constipation, states bowels are working  Anemia: Hgb 8.1 on 5/30/23    Allergies, and PMH/PSH reviewed in [facility] today.  REVIEW OF SYSTEMS:  10 point ROS of systems including Constitutional, Eyes, Respiratory, Cardiovascular, Gastroenterology, Genitourinary, Integumentary, Musculoskeletal, Psychiatric were all negative except for pertinent positives noted in my HPI.    Objective:   /78   Pulse 66   Temp 97.5  F (36.4  C)   Resp 18   Ht 1.778 m (5' 10\")   Wt 56.9 kg (125 lb 6.4 oz)   SpO2 98%   BMI 17.99 kg/m    GENERAL APPEARANCE:  Alert, in no distress  ENT:  Mouth and posterior oropharynx normal, moist mucous membranes, [place]  EYES:  EOM, conjunctivae, lids, pupils and irises normal, PERRL  RESP:  respiratory effort and palpation of chest normal, lungs clear to auscultation , no respiratory distress  CV:  Palpation and auscultation of heart done , regular rate and rhythm, no murmur, rub, or gallop, no edema  ABDOMEN:  normal bowel sounds, soft, nontender, no hepatosplenomegaly or other masses  M/S:   patient resting in bed  SKIN:  Inspection of skin and subcutaneous tissue baseline  NEURO:   speech wnl  PSYCH:  affect and mood normal    Recent labs in [facility] reviewed by me today.  and   Most Recent 3 CBC's:No lab results found.  Most Recent 3 BMP's:Recent Labs   Lab Test 02/07/23  0000 02/02/22  0000 02/17/21  0000   .6 " 138.8 141.7   POTASSIUM 4.74 4.79 4.72   CHLORIDE 96.3* 98.4 99.8   CO2 35.0* 33.3* 32.8*   BUN 12  14.6 10  12.2 10  12.5   CR 0.82 0.82 0.80   MARGE 9.4 9.7 9.8   * 113* 127*       Assessment/Plan:  (I71.40) Abdominal aortic aneurysm (AAA) without rupture, unspecified part (H)  (primary encounter diagnosis)  (Z98.890,  Z86.79) S/P AAA repair  (R53.81) Physical deconditioning  Comment: acute/ongoing, no change  Plan: PT and OT, tylenol 650mg q 4 hours prn  F/u with vascular surgery as directed     (D62) Anemia due to blood loss, acute  Comment: acute/ongoing, no change  Hgb 8.4 at discharge  Plan: Hgb 8.1 on 5/30/23, follow Hgb     (I25.10) Atherosclerosis of native coronary artery of native heart without angina pectoris  (I10) Essential hypertension, benign  (R60.0) Localized edema  Comment: acute/ongoing, no change  Plan: Bmp follow, continue Lasix 20mg QD, ASA 81mg QD, Bisoprolol 5mg QD        (J44.9) Chronic obstructive pulmonary disease, unspecified COPD type (H)  Comment: ongoing, no change  Plan: monitor SaO2 at rest and with activity, continue incruse ellipta inhaler 62.5mcg/act 1 puff daily, symbicort 160/4.5mcg 2 puffs BID, combivent 1 puff QID prn  F/u with pulmonology as directed     (K59.03) Drug-induced constipation  Comment: acute/ongoing, no change  Plan: continue senokot 8.6mg QD, miralax 17g QD    MED REC REQUIRED  Post Medication Reconciliation Status: medication reconcilation previously completed during another office visit      Orders:  No new orders      Electronically signed by: Tonya Lynn Haase, APRN CNP

## 2023-06-02 ENCOUNTER — TRANSITIONAL CARE UNIT VISIT (OUTPATIENT)
Dept: GERIATRICS | Facility: CLINIC | Age: 78
End: 2023-06-02
Payer: COMMERCIAL

## 2023-06-02 VITALS
DIASTOLIC BLOOD PRESSURE: 72 MMHG | HEART RATE: 70 BPM | TEMPERATURE: 98 F | HEIGHT: 70 IN | BODY MASS INDEX: 17.82 KG/M2 | WEIGHT: 124.5 LBS | SYSTOLIC BLOOD PRESSURE: 120 MMHG | OXYGEN SATURATION: 97 % | RESPIRATION RATE: 16 BRPM

## 2023-06-02 DIAGNOSIS — Z98.890 S/P AAA REPAIR: Primary | ICD-10-CM

## 2023-06-02 DIAGNOSIS — J44.9 CHRONIC OBSTRUCTIVE PULMONARY DISEASE, UNSPECIFIED COPD TYPE (H): ICD-10-CM

## 2023-06-02 DIAGNOSIS — D62 ANEMIA DUE TO BLOOD LOSS, ACUTE: ICD-10-CM

## 2023-06-02 DIAGNOSIS — I10 ESSENTIAL HYPERTENSION, BENIGN: ICD-10-CM

## 2023-06-02 DIAGNOSIS — Z86.79 S/P AAA REPAIR: Primary | ICD-10-CM

## 2023-06-02 PROCEDURE — 99305 1ST NF CARE MODERATE MDM 35: CPT | Performed by: INTERNAL MEDICINE

## 2023-06-02 NOTE — PROGRESS NOTES
"[facility] GERIATRICS    PRIMARY CARE PROVIDER AND CLINIC:  Leonard Palacios MD, [address], [facility] / [facility] [address]  Chief Complaint   Patient presents with     Hospital F/U      [place] Medical Record Number:  [id number]  Place of Service where encounter took place:  [facility] ([facility])     Roberto Carlos Still  is a 78 year old  (1945), admitted to the above facility from  [facility] . Hospital stay 5/15/23 through 5/25/23..       Hospital course was reviewed by me, is as per the hospital discharge summary and nurse practitioner note.    Patient is a past medical history of COPD, hypertension, coronary artery disease.  He was hospitalized for evaluation of a syncopal episode.  In the ER, he was found to have an infrarenal abdominal aortic aneurysm measuring 6.8 cm.  Echocardiogram revealed ejection fraction of 65 to 70%.  CT angiogram revealed no significant stenosis.  He ultimately underwent an EVAR on 5/19/2023.  Post procedure course complicated by persistent hypotension with subsequent lower extremity edema, improved with IV albumin and furosemide.  Respiratory status was stable postoperatively.    Patient reports feeling improved.  He is ambulating with a walker and therapy.  He denies cough, chest pain, shortness of breath, nausea, vomiting.  Appetite has been fair.  He is having regular bowel movements and is voiding without difficulty.  This morning, he states he feels \"wimpy,\" but does not elaborate.  He denies dizziness, headache, vision changes.    Most recent hemoglobin on 5/30/2023 was 8.1        CODE STATUS/ADVANCE DIRECTIVES DISCUSSION:  No Order  CPR/Full code   ALLERGIES:   Allergies   Allergen Reactions     Cephalexin Rash      PAST MEDICAL HISTORY:   Past Medical History:   Diagnosis Date     Coronary atherosclerosis of unspecified type of vessel, native or graft      Other and unspecified hyperlipidemia      Personal history of tobacco use, " presenting hazards to health       PAST SURGICAL HISTORY:   has a past surgical history that includes REPR ASD W BYPASS (5/97); APPENDECTOMY; eye exam established pt (12/06); PSA, DIAGNOSTIC (TUMOR MARKER) (3/06); removal of sperm duct(s); and COLONOSCOPY THRU STOMA, DIAGNOSTIC (6/05).  FAMILY HISTORY: family history includes Alzheimer Disease in his mother; Heart Disease in his father.  SOCIAL HISTORY:   reports that he has quit smoking. His smoking use included cigarettes. He has a 17.50 pack-year smoking history. He has never used smokeless tobacco. He reports current alcohol use of about 11.7 standard drinks of alcohol per week. He reports that he does not use drugs.  Patient's living condition: lives with spouse    Current medications were reviewed by me today:    Current Outpatient Medications   Medication Sig     acetaminophen (TYLENOL) 325 MG tablet Take 650 mg by mouth every 4 hours as needed for mild pain     aspirin (ASA) 81 MG EC tablet Take 1 tablet (81 mg) by mouth daily     bisoprolol (ZEBETA) 5 MG tablet Take 5 mg by mouth daily     budesonide-formoterol (SYMBICORT) 160-4.5 MCG/ACT Inhaler Inhale 2 puffs into the lungs 2 times daily     furosemide (LASIX) 20 MG tablet Take 20 mg by mouth daily     ipratropium-albuterol (COMBIVENT RESPIMAT)  MCG/ACT inhaler Inhale 1 puff into the lungs 4 times daily as needed for shortness of breath     polyethylene glycol (MIRALAX) 17 g packet Take 17 g by mouth daily     senna (SENOKOT) 8.6 MG tablet Take 1 tablet by mouth daily     simvastatin (ZOCOR) 20 MG tablet Take 1 tablet (20 mg) by mouth At Bedtime     umeclidinium (INCRUSE ELLIPTA) 62.5 MCG/ACT inhaler Inhale 1 puff into the lungs daily     No current facility-administered medications for this visit.       ROS:  10 point ROS of systems including Constitutional, Eyes, Respiratory, Cardiovascular, Gastroenterology, Genitourinary, Integumentary, Musculoskeletal, Psychiatric were all negative except for  "pertinent positives noted in my HPI.    Vitals:  /72   Pulse 70   Temp 98  F (36.7  C)   Resp 16   Ht 1.778 m (5' 10\")   Wt 56.5 kg (124 lb 8 oz)   SpO2 97%   BMI 17.86 kg/m    Exam:  Very pleasant, pale appearing male, lying in bed, in no distress, wearing oxygen  HEENT: Conjunctive a pale, oral mucosa moist  Neck supple  Lungs clear  CV RRR  Abdomen soft, nontender, nondistended  Extremities: Extensive bruising over arms.  No lower extremity edema  Neuro: Alert, fully oriented, pleasant.  Face symmetric.  No focal weakness.  Gait was not assessed by me today.    Lab/Diagnostic data:    Reviewed by me in epic      ASSESSMENT/PLAN:    (I71.40) Abdominal aortic aneurysm (AAA) without rupture, unspecified part (H)  (primary encounter diagnosis)  (Z98.890,  Z86.79) S/P AAA repair  (R53.81) Physical deconditioning  Comment: No complaints of abdominal pain.  Vital signs of been stable  Plan: PT and OT, tylenol 650mg q 4 hours prn  F/u with vascular surgery as directed     (D62) Anemia due to blood loss, acute  Comment: acute/ongoing, no change  Hgb 8.4 at discharge   Hgb 8.1 on 5/30/23  Plan: Monitor hemoglobin, GI status     (I25.10) Atherosclerosis of native coronary artery of native heart without angina pectoris  (I10) Essential hypertension, benign  (R60.0) Localized edema  Comment edema improved.  Plan:  continue Lasix 20mg QD, ASA 81mg QD, Bisoprolol 5mg every day, statin        (J44.9) Chronic obstructive pulmonary disease, unspecified COPD type (H), O2 dependent  Comment: ongoing, no change  Plan:, continue incruse ellipta inhaler 62.5mcg/act 1 puff daily, symbicort 160/4.5mcg 2 puffs BID, combivent 1 puff QID prn  Monitor respiratory status  F/u with pulmonology as directed      Jefferson Hardin MD      "

## 2023-06-02 NOTE — LETTER
"    6/2/2023        RE: Roberto Carlos Still  [address] [address] [facility] [address]        [facility] GERIATRICS    PRIMARY CARE PROVIDER AND CLINIC:  Leonard Palacios MD, [address], [facility] [address]  Chief Complaint   Patient presents with     Hospital F/U      [place] Medical Record Number:  [id number]  Place of Service where encounter took place:  [facility] ([facility])     Roberto Carlos Still  is a 78 year old  (1945), admitted to the above facility from  [facility] . Hospital stay 5/15/23 through 5/25/23..       Hospital course was reviewed by me, is as per the hospital discharge summary and nurse practitioner note.    Patient is a past medical history of COPD, hypertension, coronary artery disease.  He was hospitalized for evaluation of a syncopal episode.  In the ER, he was found to have an infrarenal abdominal aortic aneurysm measuring 6.8 cm.  Echocardiogram revealed ejection fraction of 65 to 70%.  CT angiogram revealed no significant stenosis.  He ultimately underwent an EVAR on 5/19/2023.  Post procedure course complicated by persistent hypotension with subsequent lower extremity edema, improved with IV albumin and furosemide.  Respiratory status was stable postoperatively.    Patient reports feeling improved.  He is ambulating with a walker and therapy.  He denies cough, chest pain, shortness of breath, nausea, vomiting.  Appetite has been fair.  He is having regular bowel movements and is voiding without difficulty.  This morning, he states he feels \"wimpy,\" but does not elaborate.  He denies dizziness, headache, vision changes.    Most recent hemoglobin on 5/30/2023 was 8.1        CODE STATUS/ADVANCE DIRECTIVES DISCUSSION:  No Order  CPR/Full code   ALLERGIES:   Allergies   Allergen Reactions     Cephalexin Rash      PAST MEDICAL HISTORY:   Past Medical History:   Diagnosis Date     Coronary atherosclerosis of unspecified type of vessel, " native or graft      Other and unspecified hyperlipidemia      Personal history of tobacco use, presenting hazards to health       PAST SURGICAL HISTORY:   has a past surgical history that includes REPR ASD W BYPASS (5/97); APPENDECTOMY; eye exam established pt (12/06); PSA, DIAGNOSTIC (TUMOR MARKER) (3/06); removal of sperm duct(s); and COLONOSCOPY THRU STOMA, DIAGNOSTIC (6/05).  FAMILY HISTORY: family history includes Alzheimer Disease in his mother; Heart Disease in his father.  SOCIAL HISTORY:   reports that he has quit smoking. His smoking use included cigarettes. He has a 17.50 pack-year smoking history. He has never used smokeless tobacco. He reports current alcohol use of about 11.7 standard drinks of alcohol per week. He reports that he does not use drugs.  Patient's living condition: lives with spouse    Current medications were reviewed by me today:    Current Outpatient Medications   Medication Sig     acetaminophen (TYLENOL) 325 MG tablet Take 650 mg by mouth every 4 hours as needed for mild pain     aspirin (ASA) 81 MG EC tablet Take 1 tablet (81 mg) by mouth daily     bisoprolol (ZEBETA) 5 MG tablet Take 5 mg by mouth daily     budesonide-formoterol (SYMBICORT) 160-4.5 MCG/ACT Inhaler Inhale 2 puffs into the lungs 2 times daily     furosemide (LASIX) 20 MG tablet Take 20 mg by mouth daily     ipratropium-albuterol (COMBIVENT RESPIMAT)  MCG/ACT inhaler Inhale 1 puff into the lungs 4 times daily as needed for shortness of breath     polyethylene glycol (MIRALAX) 17 g packet Take 17 g by mouth daily     senna (SENOKOT) 8.6 MG tablet Take 1 tablet by mouth daily     simvastatin (ZOCOR) 20 MG tablet Take 1 tablet (20 mg) by mouth At Bedtime     umeclidinium (INCRUSE ELLIPTA) 62.5 MCG/ACT inhaler Inhale 1 puff into the lungs daily     No current facility-administered medications for this visit.       ROS:  10 point ROS of systems including Constitutional, Eyes, Respiratory, Cardiovascular,  "Gastroenterology, Genitourinary, Integumentary, Musculoskeletal, Psychiatric were all negative except for pertinent positives noted in my HPI.    Vitals:  /72   Pulse 70   Temp 98  F (36.7  C)   Resp 16   Ht 1.778 m (5' 10\")   Wt 56.5 kg (124 lb 8 oz)   SpO2 97%   BMI 17.86 kg/m    Exam:  Very pleasant, pale appearing male, lying in bed, in no distress, wearing oxygen  HEENT: Conjunctive a pale, oral mucosa moist  Neck supple  Lungs clear  CV RRR  Abdomen soft, nontender, nondistended  Extremities: Extensive bruising over arms.  No lower extremity edema  Neuro: Alert, fully oriented, pleasant.  Face symmetric.  No focal weakness.  Gait was not assessed by me today.    Lab/Diagnostic data:    Reviewed by me in epic      ASSESSMENT/PLAN:    (I71.40) Abdominal aortic aneurysm (AAA) without rupture, unspecified part (H)  (primary encounter diagnosis)  (Z98.890,  Z86.79) S/P AAA repair  (R53.81) Physical deconditioning  Comment: No complaints of abdominal pain.  Vital signs of been stable  Plan: PT and OT, tylenol 650mg q 4 hours prn  F/u with vascular surgery as directed     (D62) Anemia due to blood loss, acute  Comment: acute/ongoing, no change  Hgb 8.4 at discharge   Hgb 8.1 on 5/30/23  Plan: Monitor hemoglobin, GI status     (I25.10) Atherosclerosis of native coronary artery of native heart without angina pectoris  (I10) Essential hypertension, benign  (R60.0) Localized edema  Comment edema improved.  Plan:  continue Lasix 20mg QD, ASA 81mg QD, Bisoprolol 5mg every day, statin        (J44.9) Chronic obstructive pulmonary disease, unspecified COPD type (H), O2 dependent  Comment: ongoing, no change  Plan:, continue incruse ellipta inhaler 62.5mcg/act 1 puff daily, symbicort 160/4.5mcg 2 puffs BID, combivent 1 puff QID prn  Monitor respiratory status  F/u with pulmonology as directed      Jefferson Hardin MD            Sincerely,        Jefferson Hardin MD      "

## 2023-06-05 ENCOUNTER — TRANSITIONAL CARE UNIT VISIT (OUTPATIENT)
Dept: GERIATRICS | Facility: CLINIC | Age: 78
End: 2023-06-05
Payer: COMMERCIAL

## 2023-06-05 ENCOUNTER — DOCUMENTATION ONLY (OUTPATIENT)
Dept: GERIATRICS | Facility: CLINIC | Age: 78
End: 2023-06-05

## 2023-06-05 VITALS
SYSTOLIC BLOOD PRESSURE: 132 MMHG | HEART RATE: 70 BPM | BODY MASS INDEX: 17.32 KG/M2 | HEIGHT: 70 IN | OXYGEN SATURATION: 91 % | WEIGHT: 121 LBS | DIASTOLIC BLOOD PRESSURE: 82 MMHG | RESPIRATION RATE: 18 BRPM | TEMPERATURE: 97.9 F

## 2023-06-05 DIAGNOSIS — R53.81 PHYSICAL DECONDITIONING: ICD-10-CM

## 2023-06-05 DIAGNOSIS — Z98.890 S/P AAA REPAIR: Primary | ICD-10-CM

## 2023-06-05 DIAGNOSIS — D62 ANEMIA DUE TO BLOOD LOSS, ACUTE: ICD-10-CM

## 2023-06-05 DIAGNOSIS — I10 ESSENTIAL HYPERTENSION, BENIGN: ICD-10-CM

## 2023-06-05 DIAGNOSIS — J44.9 CHRONIC OBSTRUCTIVE PULMONARY DISEASE, UNSPECIFIED COPD TYPE (H): ICD-10-CM

## 2023-06-05 DIAGNOSIS — Z86.79 S/P AAA REPAIR: Primary | ICD-10-CM

## 2023-06-05 DIAGNOSIS — I25.10 ATHEROSCLEROSIS OF NATIVE CORONARY ARTERY OF NATIVE HEART WITHOUT ANGINA PECTORIS: ICD-10-CM

## 2023-06-05 DIAGNOSIS — K59.03 DRUG-INDUCED CONSTIPATION: ICD-10-CM

## 2023-06-05 DIAGNOSIS — I71.40 ABDOMINAL AORTIC ANEURYSM (AAA) WITHOUT RUPTURE, UNSPECIFIED PART (H): ICD-10-CM

## 2023-06-05 DIAGNOSIS — R60.0 LOCALIZED EDEMA: ICD-10-CM

## 2023-06-05 PROCEDURE — 99310 SBSQ NF CARE HIGH MDM 45: CPT | Performed by: NURSE PRACTITIONER

## 2023-06-05 NOTE — PROGRESS NOTES
"[facility] GERIATRICS    Chief Complaint   Patient presents with     RECHECK     HPI:  Roberto Carlos Still is a 78 year old  (1945), who is being seen today for an episodic care visit at: [facility]) [25]. Today's concern is:   AAA s/p repair: on exam today patient is sitting up on edge of bed, denies pain or discomfort, in therapy patient is walking up to  [id number] using a RW with CGA, max assist with ADL's  CAD/HTN: /82, 144/72, 104/64 with HR 60-70 range, denies CP, palpitations, SOB weight on admission was 131lbs and current weight is 121lbs  Constipation: denies constipation, states bowels are working  Anemia: Hgb 8.1 on 5/30/23    Allergies, and PMH/PSH reviewed in [facility] today.  REVIEW OF SYSTEMS:  10 point ROS of systems including Constitutional, Eyes, Respiratory, Cardiovascular, Gastroenterology, Genitourinary, Integumentary, Musculoskeletal, Psychiatric were all negative except for pertinent positives noted in my HPI.    Objective:   /82   Pulse 70   Temp 97.9  F (36.6  C)   Resp 18   Ht 1.778 m (5' 10\")   Wt 54.9 kg (121 lb)   SpO2 91%   BMI 17.36 kg/m    GENERAL APPEARANCE:  Alert, in no distress  ENT:  Mouth and posterior oropharynx normal, moist mucous membranes, [place]  EYES:  EOM, conjunctivae, lids, pupils and irises normal, PERRL  RESP:  respiratory effort and palpation of chest normal, lungs clear to auscultation , no respiratory distress  CV:  Palpation and auscultation of heart done , regular rate and rhythm, no murmur, rub, or gallop, no edema  ABDOMEN:  normal bowel sounds, soft, nontender, no hepatosplenomegaly or other masses  M/S:   patient sitting up on edge of bed,   SKIN:  Inspection of skin and subcutaneous tissue baseline  NEURO:   speech wnl  PSYCH:  affect and mood normal    Recent labs in [facility] reviewed by me today.  and   Most Recent 3 CBC's:No lab results found.  Most Recent 3 BMP's:Recent Labs   Lab Test 02/07/23  0000 02/02/22  0000 02/17/21  0000 "   .6 138.8 141.7   POTASSIUM 4.74 4.79 4.72   CHLORIDE 96.3* 98.4 99.8   CO2 35.0* 33.3* 32.8*   BUN 12  14.6 10  12.2 10  12.5   CR 0.82 0.82 0.80   MARGE 9.4 9.7 9.8   * 113* 127*       Assessment/Plan:  (I71.40) Abdominal aortic aneurysm (AAA) without rupture, unspecified part (H)  (primary encounter diagnosis)  (Z98.890,  Z86.79) S/P AAA repair  (R53.81) Physical deconditioning  Comment: acute/ongoing, no change  Plan: PT and OT, tylenol 650mg q 4 hours prn  F/u with vascular surgery as directed     (D62) Anemia due to blood loss, acute  Comment: acute/ongoing, no change  Hgb 8.4 at discharge  Plan: Hgb 8.1 on 5/30/23, follow Hgb     (I25.10) Atherosclerosis of native coronary artery of native heart without angina pectoris  (I10) Essential hypertension, benign  (R60.0) Localized edema  Comment: acute/ongoing, no change  Plan: Bmp follow, continue Lasix 20mg QD, ASA 81mg QD, Bisoprolol 5mg QD        (J44.9) Chronic obstructive pulmonary disease, unspecified COPD type (H)  Comment: ongoing, no change  Plan: monitor SaO2 at rest and with activity, continue incruse ellipta inhaler 62.5mcg/act 1 puff daily, symbicort 160/4.5mcg 2 puffs BID, combivent 1 puff QID prn  F/u with pulmonology as directed     (K59.03) Drug-induced constipation  Comment: acute/ongoing, no change  Plan: continue senokot 8.6mg QD, miralax 17g QD    MED REC REQUIRED  Post Medication Reconciliation Status: medication reconcilation previously completed during another office visit      Orders:  No new orders      Electronically signed by: Tonya Lynn Haase, APRN CNP

## 2023-06-05 NOTE — LETTER
"    6/5/2023        RE: Roberto Carlos Still  [address] [facility] [address]        [facility]    Chief Complaint   Patient presents with     RECHECK     HPI:  Roberto Carlos Still is a 78 year old  (1945), who is being seen today for an episodic care visit at: [facility]) [25]. Today's concern is:   AAA s/p repair: on exam today patient is sitting up on edge of bed, denies pain or discomfort, in therapy patient is walking up to  [id number] using a RW with CGA, max assist with ADL's  CAD/HTN: /82, 144/72, 104/64 with HR 60-70 range, denies CP, palpitations, SOB weight on admission was 131lbs and current weight is 121lbs  Constipation: denies constipation, states bowels are working  Anemia: Hgb 8.1 on 5/30/23    Allergies, and PMH/PSH reviewed in [facility] today.  REVIEW OF SYSTEMS:  10 point ROS of systems including Constitutional, Eyes, Respiratory, Cardiovascular, Gastroenterology, Genitourinary, Integumentary, Musculoskeletal, Psychiatric were all negative except for pertinent positives noted in my HPI.    Objective:   /82   Pulse 70   Temp 97.9  F (36.6  C)   Resp 18   Ht 1.778 m (5' 10\")   Wt 54.9 kg (121 lb)   SpO2 91%   BMI 17.36 kg/m    GENERAL APPEARANCE:  Alert, in no distress  ENT:  Mouth and posterior oropharynx normal, moist mucous membranes, [place]  EYES:  EOM, conjunctivae, lids, pupils and irises normal, PERRL  RESP:  respiratory effort and palpation of chest normal, lungs clear to auscultation , no respiratory distress  CV:  Palpation and auscultation of heart done , regular rate and rhythm, no murmur, rub, or gallop, no edema  ABDOMEN:  normal bowel sounds, soft, nontender, no hepatosplenomegaly or other masses  M/S:   patient sitting up on edge of bed,   SKIN:  Inspection of skin and subcutaneous tissue baseline  NEURO:   speech wnl  PSYCH:  affect and mood normal    Recent labs in [facility] reviewed by me today.  and   Most Recent 3 CBC's:No lab " results found.  Most Recent 3 BMP's:Recent Labs   Lab Test 02/07/23  0000 02/02/22  0000 02/17/21  0000   .6 138.8 141.7   POTASSIUM 4.74 4.79 4.72   CHLORIDE 96.3* 98.4 99.8   CO2 35.0* 33.3* 32.8*   BUN 12  14.6 10  12.2 10  12.5   CR 0.82 0.82 0.80   MARGE 9.4 9.7 9.8   * 113* 127*       Assessment/Plan:  (I71.40) Abdominal aortic aneurysm (AAA) without rupture, unspecified part (H)  (primary encounter diagnosis)  (Z98.890,  Z86.79) S/P AAA repair  (R53.81) Physical deconditioning  Comment: acute/ongoing, no change  Plan: PT and OT, tylenol 650mg q 4 hours prn  F/u with vascular surgery as directed     (D62) Anemia due to blood loss, acute  Comment: acute/ongoing, no change  Hgb 8.4 at discharge  Plan: Hgb 8.1 on 5/30/23, follow Hgb     (I25.10) Atherosclerosis of native coronary artery of native heart without angina pectoris  (I10) Essential hypertension, benign  (R60.0) Localized edema  Comment: acute/ongoing, no change  Plan: Bmp follow, continue Lasix 20mg QD, ASA 81mg QD, Bisoprolol 5mg QD        (J44.9) Chronic obstructive pulmonary disease, unspecified COPD type (H)  Comment: ongoing, no change  Plan: monitor SaO2 at rest and with activity, continue incruse ellipta inhaler 62.5mcg/act 1 puff daily, symbicort 160/4.5mcg 2 puffs BID, combivent 1 puff QID prn  F/u with pulmonology as directed     (K59.03) Drug-induced constipation  Comment: acute/ongoing, no change  Plan: continue senokot 8.6mg QD, miralax 17g QD    MED REC REQUIRED  Post Medication Reconciliation Status: medication reconcilation previously completed during another office visit      Orders:  No new orders      Electronically signed by: Tonya Lynn Haase, APRN CNP             Sincerely,        Tonya Lynn Haase, APRN CNP

## 2023-06-08 ENCOUNTER — TRANSITIONAL CARE UNIT VISIT (OUTPATIENT)
Dept: GERIATRICS | Facility: CLINIC | Age: 78
End: 2023-06-08
Payer: COMMERCIAL

## 2023-06-08 VITALS
DIASTOLIC BLOOD PRESSURE: 77 MMHG | BODY MASS INDEX: 17.04 KG/M2 | OXYGEN SATURATION: 93 % | HEART RATE: 68 BPM | TEMPERATURE: 97.1 F | SYSTOLIC BLOOD PRESSURE: 119 MMHG | RESPIRATION RATE: 16 BRPM | WEIGHT: 119 LBS | HEIGHT: 70 IN

## 2023-06-08 DIAGNOSIS — I25.10 ATHEROSCLEROSIS OF NATIVE CORONARY ARTERY OF NATIVE HEART WITHOUT ANGINA PECTORIS: Primary | ICD-10-CM

## 2023-06-08 DIAGNOSIS — I71.40 ABDOMINAL AORTIC ANEURYSM (AAA) WITHOUT RUPTURE, UNSPECIFIED PART (H): ICD-10-CM

## 2023-06-08 DIAGNOSIS — R53.81 PHYSICAL DECONDITIONING: ICD-10-CM

## 2023-06-08 DIAGNOSIS — Z98.890 S/P AAA REPAIR: ICD-10-CM

## 2023-06-08 DIAGNOSIS — D62 ANEMIA DUE TO BLOOD LOSS, ACUTE: ICD-10-CM

## 2023-06-08 DIAGNOSIS — J44.9 CHRONIC OBSTRUCTIVE PULMONARY DISEASE, UNSPECIFIED COPD TYPE (H): ICD-10-CM

## 2023-06-08 DIAGNOSIS — Z86.79 S/P AAA REPAIR: ICD-10-CM

## 2023-06-08 DIAGNOSIS — I10 ESSENTIAL HYPERTENSION, BENIGN: ICD-10-CM

## 2023-06-08 DIAGNOSIS — K59.03 DRUG-INDUCED CONSTIPATION: ICD-10-CM

## 2023-06-08 DIAGNOSIS — R60.0 LOCALIZED EDEMA: ICD-10-CM

## 2023-06-08 PROCEDURE — 99310 SBSQ NF CARE HIGH MDM 45: CPT | Performed by: NURSE PRACTITIONER

## 2023-06-08 NOTE — PROGRESS NOTES
"[facility] GERIATRICS    Chief Complaint   Patient presents with     RECHECK     HPI:  Roberto Carlos Still is a 78 year old  (1945), who is being seen today for an episodic care visit at: [facility]) [25]. Today's concern is:   AAA s/p repair: on exam today patient is sitting up in w/c, denies pain or discomfort, in therapy patient is walking up to  [id number] using a RW with CGA, max assist with ADL's patient inconsistent in therapy, some days does much better than others, not motivated per therapist  CAD/HTN: /77, 126/68, 118/68 with HR 60-70 range, denies CP, palpitations, SOB weight on admission was 131lbs and current weight is 119lbs  Constipation: denies constipation, states bowels are working  Anemia: Hgb 8.1 on 5/30/23    Allergies, and PMH/PSH reviewed in [facility] today.  REVIEW OF SYSTEMS:  10 point ROS of systems including Constitutional, Eyes, Respiratory, Cardiovascular, Gastroenterology, Genitourinary, Integumentary, Musculoskeletal, Psychiatric were all negative except for pertinent positives noted in my HPI.    Objective:   /77   Pulse 68   Temp 97.1  F (36.2  C)   Resp 16   Ht 1.778 m (5' 10\")   Wt 54 kg (119 lb)   SpO2 93%   BMI 17.07 kg/m    GENERAL APPEARANCE:  Alert, in no distress, thin  ENT:  Mouth and posterior oropharynx normal, moist mucous membranes, [place]  EYES:  EOM, conjunctivae, lids, pupils and irises normal, PERRL  RESP:  respiratory effort and palpation of chest normal, lungs clear to auscultation , no respiratory distress  CV:  Palpation and auscultation of heart done , regular rate and rhythm, no murmur, rub, or gallop, no edema  ABDOMEN:  normal bowel sounds, soft, nontender, no hepatosplenomegaly or other masses  M/S:   patient sitting up in w/c  SKIN:  Inspection of skin and subcutaneous tissue baseline  NEURO:   speech wnl  PSYCH:  affect and mood normal    Recent labs in [facility] reviewed by me today.  and   Most Recent 3 CBC's:No lab results " found.  Most Recent 3 BMP's:Recent Labs   Lab Test 02/07/23  0000 02/02/22  0000 02/17/21  0000   .6 138.8 141.7   POTASSIUM 4.74 4.79 4.72   CHLORIDE 96.3* 98.4 99.8   CO2 35.0* 33.3* 32.8*   BUN 12  14.6 10  12.2 10  12.5   CR 0.82 0.82 0.80   MARGE 9.4 9.7 9.8   * 113* 127*       Assessment/Plan:  (I71.40) Abdominal aortic aneurysm (AAA) without rupture, unspecified part (H)  (primary encounter diagnosis)  (Z98.890,  Z86.79) S/P AAA repair  (R53.81) Physical deconditioning  Comment: acute/ongoing, no change  Plan: PT and OT, tylenol 650mg q 4 hours prn  F/u with vascular surgery as directed     (D62) Anemia due to blood loss, acute  Comment: acute/ongoing, no change  Hgb 8.4 at discharge  Plan: Hgb 8.1 on 5/30/23, follow Hgb     (I25.10) Atherosclerosis of native coronary artery of native heart without angina pectoris  (I10) Essential hypertension, benign  (R60.0) Localized edema  Comment: acute/ongoing, no change  Plan: Bmp follow, continue Lasix 20mg QD, ASA 81mg QD, Bisoprolol 5mg QD        (J44.9) Chronic obstructive pulmonary disease, unspecified COPD type (H)  Comment: ongoing, no change  Plan: monitor SaO2 at rest and with activity, continue incruse ellipta inhaler 62.5mcg/act 1 puff daily, symbicort 160/4.5mcg 2 puffs BID, combivent 1 puff QID prn  F/u with pulmonology as directed     (K59.03) Drug-induced constipation  Comment: acute/ongoing, no change  Plan: continue senokot 8.6mg QD, miralax 17g QD       MED REC REQUIRED  Post Medication Reconciliation Status: medication reconcilation previously completed during another office visit      Orders:  No new orders      Electronically signed by: Tonya Lynn Haase, JACKY CNP

## 2023-06-08 NOTE — LETTER
"    6/8/2023        RE: Roberto Carlos Still  [address] [address] [id number]        M [facility]    Chief Complaint   Patient presents with     RECHECK     HPI:  Roberto Carlos Still is a 78 year old  (1945), who is being seen today for an episodic care visit at: [facility]) [25]. Today's concern is:   AAA s/p repair: on exam today patient is sitting up in w/c, denies pain or discomfort, in therapy patient is walking up to  150eet using a RW with CGA, max assist with ADL's patient inconsistent in therapy, some days does much better than others, not motivated per therapist  CAD/HTN: /77, 126/68, 118/68 with HR 60-70 range, denies CP, palpitations, SOB weight on admission was 131lbs and current weight is 119lbs  Constipation: denies constipation, states bowels are working  Anemia: Hgb 8.1 on 5/30/23    Allergies, and PMH/PSH reviewed in [facility] today.  REVIEW OF SYSTEMS:  10 point ROS of systems including Constitutional, Eyes, Respiratory, Cardiovascular, Gastroenterology, Genitourinary, Integumentary, Musculoskeletal, Psychiatric were all negative except for pertinent positives noted in my HPI.    Objective:   /77   Pulse 68   Temp 97.1  F (36.2  C)   Resp 16   Ht 1.778 m (5' 10\")   Wt 54 kg (119 lb)   SpO2 93%   BMI 17.07 kg/m    GENERAL APPEARANCE:  Alert, in no distress, thin  ENT:  Mouth and posterior oropharynx normal, moist mucous membranes, [place]  EYES:  EOM, conjunctivae, lids, pupils and irises normal, PERRL  RESP:  respiratory effort and palpation of chest normal, lungs clear to auscultation , no respiratory distress  CV:  Palpation and auscultation of heart done , regular rate and rhythm, no murmur, rub, or gallop, no edema  ABDOMEN:  normal bowel sounds, soft, nontender, no hepatosplenomegaly or other masses  M/S:   patient sitting up in w/c  SKIN:  Inspection of skin and subcutaneous tissue baseline  NEURO:   speech wnl  PSYCH:  affect and mood " normal    Recent labs in [facility] reviewed by me today.  and   Most Recent 3 CBC's:No lab results found.  Most Recent 3 BMP's:Recent Labs   Lab Test 02/07/23  0000 02/02/22  0000 02/17/21  0000   .6 138.8 141.7   POTASSIUM 4.74 4.79 4.72   CHLORIDE 96.3* 98.4 99.8   CO2 35.0* 33.3* 32.8*   BUN 12  14.6 10  12.2 10  12.5   CR 0.82 0.82 0.80   MARGE 9.4 9.7 9.8   * 113* 127*       Assessment/Plan:  (I71.40) Abdominal aortic aneurysm (AAA) without rupture, unspecified part (H)  (primary encounter diagnosis)  (Z98.890,  Z86.79) S/P AAA repair  (R53.81) Physical deconditioning  Comment: acute/ongoing, no change  Plan: PT and OT, tylenol 650mg q 4 hours prn  F/u with vascular surgery as directed     (D62) Anemia due to blood loss, acute  Comment: acute/ongoing, no change  Hgb 8.4 at discharge  Plan: Hgb 8.1 on 5/30/23, follow Hgb     (I25.10) Atherosclerosis of native coronary artery of native heart without angina pectoris  (I10) Essential hypertension, benign  (R60.0) Localized edema  Comment: acute/ongoing, no change  Plan: Bmp follow, continue Lasix 20mg QD, ASA 81mg QD, Bisoprolol 5mg QD        (J44.9) Chronic obstructive pulmonary disease, unspecified COPD type (H)  Comment: ongoing, no change  Plan: monitor SaO2 at rest and with activity, continue incruse ellipta inhaler 62.5mcg/act 1 puff daily, symbicort 160/4.5mcg 2 puffs BID, combivent 1 puff QID prn  F/u with pulmonology as directed     (K59.03) Drug-induced constipation  Comment: acute/ongoing, no change  Plan: continue senokot 8.6mg QD, miralax 17g QD       MED REC REQUIRED  Post Medication Reconciliation Status: medication reconcilation previously completed during another office visit      Orders:  No new orders      Electronically signed by: Tonya Lynn Haase, APRN CNP             Sincerely,        Tonya Lynn Haase, APRN CNP

## 2023-06-13 ENCOUNTER — TRANSITIONAL CARE UNIT VISIT (OUTPATIENT)
Dept: GERIATRICS | Facility: CLINIC | Age: 78
End: 2023-06-13
Payer: COMMERCIAL

## 2023-06-13 VITALS
BODY MASS INDEX: 16.18 KG/M2 | TEMPERATURE: 97.7 F | HEART RATE: 98 BPM | DIASTOLIC BLOOD PRESSURE: 69 MMHG | WEIGHT: 113 LBS | SYSTOLIC BLOOD PRESSURE: 131 MMHG | RESPIRATION RATE: 18 BRPM | HEIGHT: 70 IN | OXYGEN SATURATION: 96 %

## 2023-06-13 DIAGNOSIS — I10 ESSENTIAL HYPERTENSION, BENIGN: ICD-10-CM

## 2023-06-13 DIAGNOSIS — Z98.890 S/P AAA REPAIR: ICD-10-CM

## 2023-06-13 DIAGNOSIS — I71.40 ABDOMINAL AORTIC ANEURYSM (AAA) WITHOUT RUPTURE, UNSPECIFIED PART (H): ICD-10-CM

## 2023-06-13 DIAGNOSIS — R53.81 PHYSICAL DECONDITIONING: ICD-10-CM

## 2023-06-13 DIAGNOSIS — I25.10 ATHEROSCLEROSIS OF NATIVE CORONARY ARTERY OF NATIVE HEART WITHOUT ANGINA PECTORIS: Primary | ICD-10-CM

## 2023-06-13 DIAGNOSIS — E44.0 MODERATE MALNUTRITION (H): ICD-10-CM

## 2023-06-13 DIAGNOSIS — R63.4 WEIGHT LOSS: ICD-10-CM

## 2023-06-13 DIAGNOSIS — R60.0 LOCALIZED EDEMA: ICD-10-CM

## 2023-06-13 DIAGNOSIS — D62 ANEMIA DUE TO BLOOD LOSS, ACUTE: ICD-10-CM

## 2023-06-13 DIAGNOSIS — J44.9 CHRONIC OBSTRUCTIVE PULMONARY DISEASE, UNSPECIFIED COPD TYPE (H): ICD-10-CM

## 2023-06-13 DIAGNOSIS — K59.03 DRUG-INDUCED CONSTIPATION: ICD-10-CM

## 2023-06-13 DIAGNOSIS — Z86.79 S/P AAA REPAIR: ICD-10-CM

## 2023-06-13 PROCEDURE — 99310 SBSQ NF CARE HIGH MDM 45: CPT | Performed by: NURSE PRACTITIONER

## 2023-06-13 NOTE — LETTER
"    6/13/2023        RE: Roberto Carlos Still  [address] [address] [address] [address]        M [facility]    Chief Complaint   Patient presents with     Nursing Home Acute     HPI:  Roberto Carlos Still is a 78 year old  (1945), who is being seen today for an episodic care visit at: [facility]) [25]. Today's concern is:   AAA s/p repair: on exam today patient is resting in bed, denies pain or discomfort, in therapy patient is walking 100-150 feet using a RW with CGA, max assist with ADL's patient inconsistent in therapy, some days does much better than others, not motivated per therapist  Discussed weight loss and lack of motivation with patient at length, patient feels he is doing well, he states his lack of appetite is because he does not like the food at the facility but family have brought food in and he does not eat food brought in either, patient denies feelings of depression or anxiety, I did offer medication to help with appetite but patient declined at this time.   CAD/HTN: /69, 116/68, 92/60  with HR 70 range, denies CP, palpitations, SOB weight on admission was 131lbs and current weight is 113lbs weight trending down, patient not eating well  Constipation: denies constipation, states bowels are working  Anemia: Hgb 8.6 on 6/5/23 trending up    Allergies, and PMH/PSH reviewed in [facility] today.  REVIEW OF SYSTEMS:  10 point ROS of systems including Constitutional, Eyes, Respiratory, Cardiovascular, Gastroenterology, Genitourinary, Integumentary, Musculoskeletal, Psychiatric were all negative except for pertinent positives noted in my HPI.    Objective:   /69   Pulse 98   Temp 97.7  F (36.5  C)   Resp 18   Ht 1.778 m (5' 10\")   Wt 51.3 kg (113 lb)   SpO2 96%   BMI 16.21 kg/m    GENERAL APPEARANCE:  Alert, in no distress, thin  ENT:  Mouth and posterior oropharynx normal, moist mucous membranes, normal hearing acuity  EYES:  EOM, conjunctivae, lids, pupils and " irises normal, PERRL  RESP:  respiratory effort and palpation of chest normal, lungs clear to auscultation , no respiratory distress  CV:  Palpation and auscultation of heart done , regular rate and rhythm, no murmur, rub, or gallop, no edema  ABDOMEN:  normal bowel sounds, soft, nontender, no hepatosplenomegaly or other masses  M/S:   patient resting in bed  SKIN:  Inspection of skin and subcutaneous tissue baseline  NEURO:   speech wnl  PSYCH:  affect and mood normal    Recent labs in [facility] reviewed by me today.  and   Most Recent 3 CBC's:No lab results found.  Most Recent 3 BMP's:Recent Labs   Lab Test 02/07/23  0000 02/02/22  0000 02/17/21  0000   .6 138.8 141.7   POTASSIUM 4.74 4.79 4.72   CHLORIDE 96.3* 98.4 99.8   CO2 35.0* 33.3* 32.8*   BUN 12  14.6 10  12.2 10  12.5   CR 0.82 0.82 0.80   MARGE 9.4 9.7 9.8   * 113* 127*       Assessment/Plan:  (I71.40) Abdominal aortic aneurysm (AAA) without rupture, unspecified part (H)  (primary encounter diagnosis)  (Z98.890,  Z86.79) S/P AAA repair  (R53.81) Physical deconditioning  Comment: acute/ongoing, no change  Plan: PT and OT, tylenol 650mg q 4 hours prn  F/u with vascular surgery as directed    (R63.4) weight loss  (E44.0) moderate malnutrition  Acute/ongoing   Weight loss of ~15lbs in 3 weeks  Plan: dietician to follow, increase dietary supplements, patient declined starting medication for appetite or antidepressant.      (D62) Anemia due to blood loss, acute  Comment: acute/ongoing, no change  Hgb 8.4 at discharge  Plan: Hgb 8.1 on 5/30/23, follow Hgb     (I25.10) Atherosclerosis of native coronary artery of native heart without angina pectoris  (I10) Essential hypertension, benign  (R60.0) Localized edema  Comment: acute/ongoing, no change  Plan: Bmp follow, continue Lasix 20mg QD, ASA 81mg QD, Bisoprolol 5mg QD        (J44.9) Chronic obstructive pulmonary disease, unspecified COPD type (H)  Comment: ongoing, no change  Plan: monitor SaO2 at rest  and with activity, continue incruse ellipta inhaler 62.5mcg/act 1 puff daily, symbicort 160/4.5mcg 2 puffs BID, combivent 1 puff QID prn  F/u with pulmonology as directed     (K59.03) Drug-induced constipation  Comment: acute/ongoing, no change  Plan: continue senokot 8.6mg QD, miralax 17g QD    MED REC REQUIRED  Post Medication Reconciliation Status: medication reconcilation previously completed during another office visit      Orders:  No change in orders      Electronically signed by: Tonya Lynn Haase, APRN CNP           Sincerely,        Tonya Lynn Haase, APRN CNP

## 2023-06-13 NOTE — PROGRESS NOTES
"[facility]    Chief Complaint   Patient presents with     Nursing Home Acute     HPI:  Roberto Carlos Still is a 78 year old  (1945), who is being seen today for an episodic care visit at: [facility]) [25]. Today's concern is:   AAA s/p repair: on exam today patient is resting in bed, denies pain or discomfort, in therapy patient is walking 100-150 feet using a RW with CGA, max assist with ADL's patient inconsistent in therapy, some days does much better than others, not motivated per therapist  Discussed weight loss and lack of motivation with patient at length, patient feels he is doing well, he states his lack of appetite is because he does not like the food at the facility but family have brought food in and he does not eat food brought in either, patient denies feelings of depression or anxiety, I did offer medication to help with appetite but patient declined at this time.   CAD/HTN: /69, 116/68, 92/60  with HR 70 range, denies CP, palpitations, SOB weight on admission was 131lbs and current weight is 113lbs weight trending down, patient not eating well  Constipation: denies constipation, states bowels are working  Anemia: Hgb 8.6 on 6/5/23 trending up    Allergies, and PMH/PSH reviewed in [facility] today.  REVIEW OF SYSTEMS:  10 point ROS of systems including Constitutional, Eyes, Respiratory, Cardiovascular, Gastroenterology, Genitourinary, Integumentary, Musculoskeletal, Psychiatric were all negative except for pertinent positives noted in my HPI.    Objective:   /69   Pulse 98   Temp 97.7  F (36.5  C)   Resp 18   Ht 1.778 m (5' 10\")   Wt 51.3 kg (113 lb)   SpO2 96%   BMI 16.21 kg/m    GENERAL APPEARANCE:  Alert, in no distress, thin  ENT:  Mouth and posterior oropharynx normal, moist mucous membranes, normal hearing acuity  EYES:  EOM, conjunctivae, lids, pupils and irises normal, PERRL  RESP:  respiratory effort and palpation of chest normal, lungs clear to " auscultation , no respiratory distress  CV:  Palpation and auscultation of heart done , regular rate and rhythm, no murmur, rub, or gallop, no edema  ABDOMEN:  normal bowel sounds, soft, nontender, no hepatosplenomegaly or other masses  M/S:   patient resting in bed  SKIN:  Inspection of skin and subcutaneous tissue baseline  NEURO:   speech wnl  PSYCH:  affect and mood normal    Recent labs in [facility] reviewed by me today.  and   Most Recent 3 CBC's:No lab results found.  Most Recent 3 BMP's:Recent Labs   Lab Test 02/07/23  0000 02/02/22  0000 02/17/21  0000   .6 138.8 141.7   POTASSIUM 4.74 4.79 4.72   CHLORIDE 96.3* 98.4 99.8   CO2 35.0* 33.3* 32.8*   BUN 12  14.6 10  12.2 10  12.5   CR 0.82 0.82 0.80   MARGE 9.4 9.7 9.8   * 113* 127*       Assessment/Plan:  (I71.40) Abdominal aortic aneurysm (AAA) without rupture, unspecified part (H)  (primary encounter diagnosis)  (Z98.890,  Z86.79) S/P AAA repair  (R53.81) Physical deconditioning  Comment: acute/ongoing, no change  Plan: PT and OT, tylenol 650mg q 4 hours prn  F/u with vascular surgery as directed    (R63.4) weight loss  (E44.0) moderate malnutrition  Acute/ongoing   Weight loss of ~15lbs in 3 weeks  Plan: dietician to follow, increase dietary supplements, patient declined starting medication for appetite or antidepressant.      (D62) Anemia due to blood loss, acute  Comment: acute/ongoing, no change  Hgb 8.4 at discharge  Plan: Hgb 8.1 on 5/30/23, follow Hgb     (I25.10) Atherosclerosis of native coronary artery of native heart without angina pectoris  (I10) Essential hypertension, benign  (R60.0) Localized edema  Comment: acute/ongoing, no change  Plan: Bmp follow, continue Lasix 20mg QD, ASA 81mg QD, Bisoprolol 5mg QD        (J44.9) Chronic obstructive pulmonary disease, unspecified COPD type (H)  Comment: ongoing, no change  Plan: monitor SaO2 at rest and with activity, continue incruse ellipta inhaler 62.5mcg/act 1 puff daily, symbicort  160/4.5mcg 2 puffs BID, combivent 1 puff QID prn  F/u with pulmonology as directed     (K59.03) Drug-induced constipation  Comment: acute/ongoing, no change  Plan: continue senokot 8.6mg QD, miralax 17g QD    MED REC REQUIRED  Post Medication Reconciliation Status: medication reconcilation previously completed during another office visit      Orders:  No change in orders      Electronically signed by: Tonya Lynn Haase, APRN CNP

## 2023-06-15 ENCOUNTER — TRANSITIONAL CARE UNIT VISIT (OUTPATIENT)
Dept: GERIATRICS | Facility: CLINIC | Age: 78
End: 2023-06-15
Payer: COMMERCIAL

## 2023-06-15 VITALS
WEIGHT: 114 LBS | HEART RATE: 70 BPM | SYSTOLIC BLOOD PRESSURE: 97 MMHG | RESPIRATION RATE: 18 BRPM | HEIGHT: 70 IN | TEMPERATURE: 97.8 F | BODY MASS INDEX: 16.32 KG/M2 | OXYGEN SATURATION: 97 % | DIASTOLIC BLOOD PRESSURE: 57 MMHG

## 2023-06-15 DIAGNOSIS — K59.03 DRUG-INDUCED CONSTIPATION: ICD-10-CM

## 2023-06-15 DIAGNOSIS — I25.10 ATHEROSCLEROSIS OF NATIVE CORONARY ARTERY OF NATIVE HEART WITHOUT ANGINA PECTORIS: Primary | ICD-10-CM

## 2023-06-15 DIAGNOSIS — R63.4 WEIGHT LOSS: ICD-10-CM

## 2023-06-15 DIAGNOSIS — I10 ESSENTIAL HYPERTENSION, BENIGN: ICD-10-CM

## 2023-06-15 DIAGNOSIS — Z86.79 S/P AAA REPAIR: ICD-10-CM

## 2023-06-15 DIAGNOSIS — I71.40 ABDOMINAL AORTIC ANEURYSM (AAA) WITHOUT RUPTURE, UNSPECIFIED PART (H): ICD-10-CM

## 2023-06-15 DIAGNOSIS — R53.81 PHYSICAL DECONDITIONING: ICD-10-CM

## 2023-06-15 DIAGNOSIS — J44.9 CHRONIC OBSTRUCTIVE PULMONARY DISEASE, UNSPECIFIED COPD TYPE (H): ICD-10-CM

## 2023-06-15 DIAGNOSIS — Z98.890 S/P AAA REPAIR: ICD-10-CM

## 2023-06-15 DIAGNOSIS — E44.0 MODERATE MALNUTRITION (H): ICD-10-CM

## 2023-06-15 DIAGNOSIS — D62 ANEMIA DUE TO BLOOD LOSS, ACUTE: ICD-10-CM

## 2023-06-15 PROCEDURE — 99310 SBSQ NF CARE HIGH MDM 45: CPT | Performed by: NURSE PRACTITIONER

## 2023-06-15 NOTE — PROGRESS NOTES
"[facility]    Chief Complaint   Patient presents with     RECHECK     HPI:  Roberto Carlos Still is a 78 year old  (1945), who is being seen today for an episodic care visit at: [facility]) [25]. Today's concern is:   AAA s/p repair: on exam today patient is resting in bed, denies pain or discomfort, in therapy patient is walking 100-150 feet using a RW with CGA, therapy states patient has had decline and weakness over past 1-2 weeks in TCU, max assist with ADL's patient inconsistent in therapy, weight loss noted, discussed at length with patient, he states he will eat once he returns home, he does not like the food here in [facility]   Discussed starting an antidepressant, patient states his mood is good, he does not feel he has any signs of depression, states he is feeling well, declines medication changes  CAD/HTN: /68, 97/57, 122/76  with HR 70 range, denies CP, palpitations, SOB weight on admission was 131lbs and current weight is 113lbs weight trending down, patient not eating well  Constipation: denies constipation, states bowels are working  Anemia: Hgb 8.6 on 6/5/23 trending up    Allergies, and PMH/PSH reviewed in [facility] today.  REVIEW OF SYSTEMS:  10 point ROS of systems including Constitutional, Eyes, Respiratory, Cardiovascular, Gastroenterology, Genitourinary, Integumentary, Musculoskeletal, Psychiatric were all negative except for pertinent positives noted in my HPI.    Objective:   BP 97/57   Pulse 70   Temp 97.8  F (36.6  C)   Resp 18   Ht 1.778 m (5' 10\")   Wt 51.7 kg (114 lb)   SpO2 97%   BMI 16.36 kg/m    GENERAL APPEARANCE:  Alert, in no distress  ENT:  Mouth and posterior oropharynx normal, moist mucous membranes, normal hearing acuity  EYES:  EOM, conjunctivae, lids, pupils and irises normal, PERRL  RESP:  respiratory effort and palpation of chest normal, lungs clear to auscultation , no respiratory distress, diminished breath sounds bases bilaterally  CV:  " Palpation and auscultation of heart done , regular rate and rhythm, no murmur, rub, or gallop, no edema  ABDOMEN:  normal bowel sounds, soft, nontender, no hepatosplenomegaly or other masses  M/S:   patient resting in bed  SKIN:  Inspection of skin and subcutaneous tissue baseline, did not visualize surgical incision  NEURO:   speech wnl  PSYCH:  affect and mood normal    Recent labs in [facility] reviewed by me today.  and   Most Recent 3 CBC's:No lab results found.  Most Recent 3 BMP's:Recent Labs   Lab Test 02/07/23  0000 02/02/22  0000 02/17/21  0000   .6 138.8 141.7   POTASSIUM 4.74 4.79 4.72   CHLORIDE 96.3* 98.4 99.8   CO2 35.0* 33.3* 32.8*   BUN 12  14.6 10  12.2 10  12.5   CR 0.82 0.82 0.80   MARGE 9.4 9.7 9.8   * 113* 127*       Assessment/Plan:  (I71.40) Abdominal aortic aneurysm (AAA) without rupture, unspecified part (H)  (primary encounter diagnosis)  (Z98.890,  Z86.79) S/P AAA repair  (R53.81) Physical deconditioning  Comment: acute/ongoing, no change  Plan: PT and OT, tylenol 650mg q 4 hours prn  F/u with vascular surgery on 6/14/23 ordered ensure BID no other changes in orders     (R63.4) weight loss  (E44.0) moderate malnutrition  Acute/ongoing, no change  Weight loss of ~15lbs in 3 weeks  Plan: dietician to follow, increase dietary supplements, patient declined starting medication for appetite or antidepressant.      (D62) Anemia due to blood loss, acute  Comment: acute/ongoing, no change  Hgb 8.4 at discharge  Plan: Hgb 8.6 on 6/5/23, follow Hgb     (I25.10) Atherosclerosis of native coronary artery of native heart without angina pectoris  (I10) Essential hypertension, benign  Comment: acute/ongoing, no change  Plan: Bmp follow, continue Lasix 20mg QD, ASA 81mg QD, Bisoprolol 5mg QD        (J44.9) Chronic obstructive pulmonary disease, unspecified COPD type (H)  Comment: ongoing, no change  Plan: monitor SaO2 at rest and with activity, continue incruse ellipta inhaler 62.5mcg/act 1 puff  daily, symbicort 160/4.5mcg 2 puffs BID, combivent 1 puff QID prn  F/u with pulmonology as directed     (K59.03) Drug-induced constipation  Comment: acute/ongoing, no change  Plan: continue senokot 8.6mg QD, miralax 17g QD       MED REC REQUIRED  Post Medication Reconciliation Status: medication reconcilation previously completed during another office visit      Orders:  No new orders      Wheelchair Documentation  Size: 18 x 16 standard  Corresponding cushion: Yes: comfort curve  Standard foot rests: Yes  Elevating leg rests: No  Arm rests: Yes: full length  Lap tray: No  Dose the patient use oxygen? Yes: 1-4liters   Is the patient able to propel wheelchair? Yes If no why not? n/a And is there someone who can?yes  1. The patient has mobility limitations that impairs their ability to participate in one or more mobility related activities: Toileting, Feeding, Grooming and Bathing.  The wheelchair is suitable and necessary for use in the patient's home.  2. The patient's mobility limitations cannot be safely resolved by using a cane/walker:Yes    Reason why a cane or walker will not meet the patient's needs. (ie: balance, tolerance, level of assistance) requires assistance d/t balance  3. The patients home has adequate access to use a manual wheelchair:Yes  4. The use of a manual wheelchair on a regular basis will improve the patients ability to participate in mobility related ADL's at home:Yes  5. The patient is willing to use a manual wheelchair at home:Yes  6. The patient has adequate upper body strength and the mental capability to safely use a manual wheelchair and/or has a caregiver that is able to assist: Yes  7. Does the patient have a lower extremity injury or edema?Yes  Reason for Type of Wheelchair  Patient weight: 0 lbs 0 oz      Electronically signed by: Tonya Lynn Haase, APRN CNP

## 2023-06-15 NOTE — LETTER
"    6/15/2023        RE: Roberto Carlos Still  [address] [address]        [facility] GERIATRICS    Chief Complaint   Patient presents with     RECHECK     HPI:  Roberto Carlos Still is a 78 year old  (1945), who is being seen today for an episodic care visit at: [facility]) [25]. Today's concern is:   AAA s/p repair: on exam today patient is resting in bed, denies pain or discomfort, in therapy patient is walking 100-150 feet using a RW with CGA, therapy states patient has had decline and weakness over past 1-2 weeks in [place], max assist with ADL's patient inconsistent in therapy, weight loss noted, discussed at length with patient, he states he will eat once he returns home, he does not like the food here in [place]   Discussed starting an antidepressant, patient states his mood is good, he does not feel he has any signs of depression, states he is feeling well, declines medication changes  CAD/HTN: /68, 97/57, 122/76  with HR 70 range, denies CP, palpitations, SOB weight on admission was 131lbs and current weight is 113lbs weight trending down, patient not eating well  Constipation: denies constipation, states bowels are working  Anemia: Hgb 8.6 on 6/5/23 trending up    Allergies, and PMH/PSH reviewed in [facility] today.  REVIEW OF SYSTEMS:  10 point ROS of systems including Constitutional, Eyes, Respiratory, Cardiovascular, Gastroenterology, Genitourinary, Integumentary, Musculoskeletal, Psychiatric were all negative except for pertinent positives noted in my HPI.    Objective:   BP 97/57   Pulse 70   Temp 97.8  F (36.6  C)   Resp 18   Ht 1.778 m (5' 10\")   Wt 51.7 kg (114 lb)   SpO2 97%   BMI 16.36 kg/m    GENERAL APPEARANCE:  Alert, in no distress  ENT:  Mouth and posterior oropharynx normal, moist mucous membranes, normal hearing acuity  EYES:  EOM, conjunctivae, lids, pupils and irises normal, PERRL  RESP:  respiratory effort and palpation of chest normal, lungs " clear to auscultation , no respiratory distress, diminished breath sounds bases bilaterally  CV:  Palpation and auscultation of heart done , regular rate and rhythm, no murmur, rub, or gallop, no edema  ABDOMEN:  normal bowel sounds, soft, nontender, no hepatosplenomegaly or other masses  M/S:   patient resting in bed  SKIN:  Inspection of skin and subcutaneous tissue baseline, did not visualize surgical incision  NEURO:   speech wnl  PSYCH:  affect and mood normal    Recent labs in [facility] reviewed by me today.  and   Most Recent 3 CBC's:No lab results found.  Most Recent 3 BMP's:Recent Labs   Lab Test 02/07/23  0000 02/02/22  0000 02/17/21  0000   .6 138.8 141.7   POTASSIUM 4.74 4.79 4.72   CHLORIDE 96.3* 98.4 99.8   CO2 35.0* 33.3* 32.8*   BUN 12  14.6 10  12.2 10  12.5   CR 0.82 0.82 0.80   MARGE 9.4 9.7 9.8   * 113* 127*       Assessment/Plan:  (I71.40) Abdominal aortic aneurysm (AAA) without rupture, unspecified part (H)  (primary encounter diagnosis)  (Z98.890,  Z86.79) S/P AAA repair  (R53.81) Physical deconditioning  Comment: acute/ongoing, no change  Plan: PT and OT, tylenol 650mg q 4 hours prn  F/u with vascular surgery on 6/14/23 ordered ensure BID no other changes in orders     (R63.4) weight loss  (E44.0) moderate malnutrition  Acute/ongoing, no change  Weight loss of ~15lbs in 3 weeks  Plan: dietician to follow, increase dietary supplements, patient declined starting medication for appetite or antidepressant.      (D62) Anemia due to blood loss, acute  Comment: acute/ongoing, no change  Hgb 8.4 at discharge  Plan: Hgb 8.6 on 6/5/23, follow Hgb     (I25.10) Atherosclerosis of native coronary artery of native heart without angina pectoris  (I10) Essential hypertension, benign  Comment: acute/ongoing, no change  Plan: Bmp follow, continue Lasix 20mg QD, ASA 81mg QD, Bisoprolol 5mg QD        (J44.9) Chronic obstructive pulmonary disease, unspecified COPD type (H)  Comment: ongoing, no  change  Plan: monitor SaO2 at rest and with activity, continue incruse ellipta inhaler 62.5mcg/act 1 puff daily, symbicort 160/4.5mcg 2 puffs BID, combivent 1 puff QID prn  F/u with pulmonology as directed     (K59.03) Drug-induced constipation  Comment: acute/ongoing, no change  Plan: continue senokot 8.6mg QD, miralax 17g QD       MED REC REQUIRED  Post Medication Reconciliation Status: medication reconcilation previously completed during another office visit      Orders:  No new orders      Wheelchair Documentation  Size: 18 x 16 standard  Corresponding cushion: Yes: comfort curve  Standard foot rests: Yes  Elevating leg rests: No  Arm rests: Yes: full length  Lap tray: No  Dose the patient use oxygen? Yes: 1-4liters   Is the patient able to propel wheelchair? Yes If no why not? n/a And is there someone who can?yes  1. The patient has mobility limitations that impairs their ability to participate in one or more mobility related activities: Toileting, Feeding, Grooming and Bathing.  The wheelchair is suitable and necessary for use in the patient's home.  2. The patient's mobility limitations cannot be safely resolved by using a cane/walker:Yes    Reason why a cane or walker will not meet the patient's needs. (ie: balance, tolerance, level of assistance) requires assistance d/t balance  3. The patients home has adequate access to use a manual wheelchair:Yes  4. The use of a manual wheelchair on a regular basis will improve the patients ability to participate in mobility related ADL's at home:Yes  5. The patient is willing to use a manual wheelchair at home:Yes  6. The patient has adequate upper body strength and the mental capability to safely use a manual wheelchair and/or has a caregiver that is able to assist: Yes  7. Does the patient have a lower extremity injury or edema?Yes  Reason for Type of Wheelchair  Patient weight: 0 lbs 0 oz      Electronically signed by: Tonya Lynn Haase, APRN CNP              Sincerely,        Tonya Lynn Haase, APRN CNP

## 2023-06-19 ENCOUNTER — TRANSITIONAL CARE UNIT VISIT (OUTPATIENT)
Dept: GERIATRICS | Facility: CLINIC | Age: 78
End: 2023-06-19
Payer: COMMERCIAL

## 2023-06-19 VITALS
SYSTOLIC BLOOD PRESSURE: 104 MMHG | TEMPERATURE: 98 F | HEART RATE: 76 BPM | HEIGHT: 60 IN | DIASTOLIC BLOOD PRESSURE: 70 MMHG | OXYGEN SATURATION: 98 % | BODY MASS INDEX: 22.58 KG/M2 | WEIGHT: 115 LBS

## 2023-06-19 DIAGNOSIS — R63.4 WEIGHT LOSS: ICD-10-CM

## 2023-06-19 DIAGNOSIS — D62 ANEMIA DUE TO BLOOD LOSS, ACUTE: ICD-10-CM

## 2023-06-19 DIAGNOSIS — Z86.79 S/P AAA REPAIR: ICD-10-CM

## 2023-06-19 DIAGNOSIS — Z98.890 S/P AAA REPAIR: ICD-10-CM

## 2023-06-19 DIAGNOSIS — I25.10 ATHEROSCLEROSIS OF NATIVE CORONARY ARTERY OF NATIVE HEART WITHOUT ANGINA PECTORIS: ICD-10-CM

## 2023-06-19 DIAGNOSIS — R53.81 PHYSICAL DECONDITIONING: ICD-10-CM

## 2023-06-19 DIAGNOSIS — I71.40 ABDOMINAL AORTIC ANEURYSM (AAA) WITHOUT RUPTURE, UNSPECIFIED PART (H): Primary | ICD-10-CM

## 2023-06-19 DIAGNOSIS — I10 ESSENTIAL HYPERTENSION, BENIGN: ICD-10-CM

## 2023-06-19 DIAGNOSIS — E44.0 MODERATE MALNUTRITION (H): ICD-10-CM

## 2023-06-19 DIAGNOSIS — J44.9 CHRONIC OBSTRUCTIVE PULMONARY DISEASE, UNSPECIFIED COPD TYPE (H): ICD-10-CM

## 2023-06-19 DIAGNOSIS — K59.03 DRUG-INDUCED CONSTIPATION: ICD-10-CM

## 2023-06-19 PROCEDURE — 99310 SBSQ NF CARE HIGH MDM 45: CPT | Performed by: NURSE PRACTITIONER

## 2023-06-19 NOTE — LETTER
"    6/19/2023        RE: Roberto Carlos Still  [address] [address] [address]        [facility] GERIATRICS    Chief Complaint   Patient presents with     RECHECK     HPI:  Roberto Carlos Still is a 78 year old  (1945), who is being seen today for an episodic care visit at: [facility]) [25]. Today's concern is:   AAA s/p repair: on exam today patient is resting in bed, denies pain or discomfort, states he is feeling well,  in therapy patient is walking 100-150 feet using a RW with CGA, he is finished with therapy, wife is looking for placement in shelter max assist with ADL's patient inconsistent in therapy, weight loss noted, discussed at length with patient and wife at bedside, offered antidepressant remeron for appetite stimulant patient states he wants to discuss with wife, states he is not depressed, explained the use of remeron for appetite stimulant  CAD/HTN: /70, 120/70, 104/64  with HR 70 range, denies CP, palpitations, SOB weight on admission was 131lbs and current weight is 115lbs weight up 2 lbs since last week  Constipation: denies constipation, states bowels are working  Anemia: Hgb 8.6 on 6/5/23 trending up    Allergies, and PMH/PSH reviewed in [facility] today.  REVIEW OF SYSTEMS:  10 point ROS of systems including Constitutional, Eyes, Respiratory, Cardiovascular, Gastroenterology, Genitourinary, Integumentary, Musculoskeletal, Psychiatric were all negative except for pertinent positives noted in my HPI.    Objective:   /70   Pulse 76   Temp 98  F (36.7  C)   Ht (!) 0.051 m (2\")   Wt 52.2 kg (115 lb)   .8 cm (70\")   SpO2 98%   BMI 51445.46 kg/m    GENERAL APPEARANCE:  Alert, in no distress, thin  ENT:  Mouth and posterior oropharynx normal, moist mucous membranes, normal hearing acuity  EYES:  EOM, conjunctivae, lids, pupils and irises normal, PERRL  RESP:  respiratory effort and palpation of chest normal, lungs clear to auscultation , no respiratory " distress  CV:  Palpation and auscultation of heart done , regular rate and rhythm, no murmur, rub, or gallop, no edema  ABDOMEN:  normal bowel sounds, soft, nontender, no hepatosplenomegaly or other masses  M/S:   patient resting in bed  SKIN:  Inspection of skin and subcutaneous tissue baseline, did not visualize surgical incision  NEURO:   speech wnl  PSYCH:  affect and mood normal    Recent labs in [facility] reviewed by me today.  and   Most Recent 3 CBC's:No lab results found.  Most Recent 3 BMP's:Recent Labs   Lab Test 02/07/23  0000 02/02/22  0000 02/17/21  0000   .6 138.8 141.7   POTASSIUM 4.74 4.79 4.72   CHLORIDE 96.3* 98.4 99.8   CO2 35.0* 33.3* 32.8*   BUN 12  14.6 10  12.2 10  12.5   CR 0.82 0.82 0.80   MARGE 9.4 9.7 9.8   * 113* 127*       Assessment/Plan:  I71.40) Abdominal aortic aneurysm (AAA) without rupture, unspecified part (H)  (primary encounter diagnosis)  (Z98.890,  Z86.79) S/P AAA repair  (R53.81) Physical deconditioning  Comment: acute/ongoing, no change  Plan: PT and OT, tylenol 650mg q 4 hours prn  F/u with vascular surgery on 6/14/23 ordered ensure BID no other changes in orders     (R63.4) weight loss  (E44.0) moderate malnutrition  Acute/ongoing, no change  Weight loss of ~15lbs in 3 weeks  Plan: dietician to follow, increase dietary supplements, patient discussed starting remeron with patient and wife, they are going to discuss     (D62) Anemia due to blood loss, acute  Comment: acute/ongoing, no change  Hgb 8.4 at discharge  Plan: Hgb 8.6 on 6/5/23, follow Hgb     (I25.10) Atherosclerosis of native coronary artery of native heart without angina pectoris  (I10) Essential hypertension, benign  Comment: acute/ongoing, no change  Plan: Bmp follow, ASA 81mg QD, DC Lasix and Bisoprolol         (J44.9) Chronic obstructive pulmonary disease, unspecified COPD type (H)  Comment: ongoing, no change  Plan: monitor SaO2 at rest and with activity, continue incruse ellipta inhaler  62.5mcg/act 1 puff daily, symbicort 160/4.5mcg 2 puffs BID, combivent 1 puff QID prn  F/u with pulmonology as directed     (K59.03) Drug-induced constipation  Comment: acute/ongoing, no change  Plan: continue senokot 8.6mg QD, miralax 17g QD    MED REC REQUIRED  Post Medication Reconciliation Status: medication reconcilation previously completed during another office visit      Orders:  YEFRI lasix and   YEFRI bisoprolol    Electronically signed by: Tonya Lynn Haase, APRN CNP             Sincerely,        Tonya Lynn Haase, APRN CNP

## 2023-06-19 NOTE — PROGRESS NOTES
"[facility] GERIATRICS    Chief Complaint   Patient presents with     RECHECK     HPI:  Roberto Carlos Still is a 78 year old  (1945), who is being seen today for an episodic care visit at: [facility]) [25]. Today's concern is:   AAA s/p repair: on exam today patient is resting in bed, denies pain or discomfort, states he is feeling well,  in therapy patient is walking 100-150 feet using a RW with CGA, he is finished with therapy, wife is looking for placement in [place] max assist with ADL's patient inconsistent in therapy, weight loss noted, discussed at length with patient and wife at bedside, offered antidepressant remeron for appetite stimulant patient states he wants to discuss with wife, states he is not depressed, explained the use of remeron for appetite stimulant  CAD/HTN: /70, 120/70, 104/64  with HR 70 range, denies CP, palpitations, SOB weight on admission was 131lbs and current weight is 115lbs weight up 2 lbs since last week  Constipation: denies constipation, states bowels are working  Anemia: Hgb 8.6 on 6/5/23 trending up    Allergies, and PMH/PSH reviewed in [facility] today.  REVIEW OF SYSTEMS:  10 point ROS of systems including Constitutional, Eyes, Respiratory, Cardiovascular, Gastroenterology, Genitourinary, Integumentary, Musculoskeletal, Psychiatric were all negative except for pertinent positives noted in my HPI.    Objective:   /70   Pulse 76   Temp 98  F (36.7  C)   Ht (!) 0.051 m (2\")   Wt 52.2 kg (115 lb)   .8 cm (70\")   SpO2 98%   BMI 69348.46 kg/m    GENERAL APPEARANCE:  Alert, in no distress, thin  ENT:  Mouth and posterior oropharynx normal, moist mucous membranes, normal hearing acuity  EYES:  EOM, conjunctivae, lids, pupils and irises normal, PERRL  RESP:  respiratory effort and palpation of chest normal, lungs clear to auscultation , no respiratory distress  CV:  Palpation and auscultation of heart done , regular rate and rhythm, no murmur, rub, or " gallop, no edema  ABDOMEN:  normal bowel sounds, soft, nontender, no hepatosplenomegaly or other masses  M/S:   patient resting in bed  SKIN:  Inspection of skin and subcutaneous tissue baseline, did not visualize surgical incision  NEURO:   speech wnl  PSYCH:  affect and mood normal    Recent labs in [facility] reviewed by me today.  and   Most Recent 3 CBC's:No lab results found.  Most Recent 3 BMP's:Recent Labs   Lab Test 02/07/23  0000 02/02/22  0000 02/17/21  0000   .6 138.8 141.7   POTASSIUM 4.74 4.79 4.72   CHLORIDE 96.3* 98.4 99.8   CO2 35.0* 33.3* 32.8*   BUN 12  14.6 10  12.2 10  12.5   CR 0.82 0.82 0.80   MARGE 9.4 9.7 9.8   * 113* 127*       Assessment/Plan:  I71.40) Abdominal aortic aneurysm (AAA) without rupture, unspecified part (H)  (primary encounter diagnosis)  (Z98.890,  Z86.79) S/P AAA repair  (R53.81) Physical deconditioning  Comment: acute/ongoing, no change  Plan: PT and OT, tylenol 650mg q 4 hours prn  F/u with vascular surgery on 6/14/23 ordered ensure BID no other changes in orders     (R63.4) weight loss  (E44.0) moderate malnutrition  Acute/ongoing, no change  Weight loss of ~15lbs in 3 weeks  Plan: dietician to follow, increase dietary supplements, patient discussed starting remeron with patient and wife, they are going to discuss     (D62) Anemia due to blood loss, acute  Comment: acute/ongoing, no change  Hgb 8.4 at discharge  Plan: Hgb 8.6 on 6/5/23, follow Hgb     (I25.10) Atherosclerosis of native coronary artery of native heart without angina pectoris  (I10) Essential hypertension, benign  Comment: acute/ongoing, no change  Plan: Bmp follow, ASA 81mg QD, DC Lasix and Bisoprolol         (J44.9) Chronic obstructive pulmonary disease, unspecified COPD type (H)  Comment: ongoing, no change  Plan: monitor SaO2 at rest and with activity, continue incruse ellipta inhaler 62.5mcg/act 1 puff daily, symbicort 160/4.5mcg 2 puffs BID, combivent 1 puff QID prn  F/u with pulmonology as  directed     (K59.03) Drug-induced constipation  Comment: acute/ongoing, no change  Plan: continue senokot 8.6mg QD, miralax 17g QD    MED REC REQUIRED  Post Medication Reconciliation Status: medication reconcilation previously completed during another office visit      Orders:  YEFRI lasix and   YEFRI bisoprolol    Electronically signed by: Tonya Lynn Haase, APRN CNP

## 2023-06-22 NOTE — PROGRESS NOTES
"[facility] GERIATRICS    Chief Complaint   Patient presents with     Nursing Home Acute     HPI:  Roberto Carlos Still is a 78 year old  (1945), who is being seen today for an episodic care visit at: [facility]) [25]. Today's concern is:   AAA s/p repair: on exam today patient is resting in bed, denies pain or discomfort, states he is feeling well,  in therapy patient is walking 100-150 feet using a RW with CGA, patient is no longer doing therapy, [place]   CAD/HTN: BP 98/56, 98/65, 99/66  with HR 70 range, denies CP, palpitations, SOB weight on admission was 131lbs and current weight is 115.5lbs weight trending up slightly  Failure to thrive and weight loss: weight loss as above, started on remeron 6/21/23  Constipation: denies constipation, states bowels are working  Anemia: Hgb 8.6 on 6/5/23 trending up    Allergies, and PMH/PSH reviewed in [facility] today.  REVIEW OF SYSTEMS:  10 point ROS of systems including Constitutional, Eyes, Respiratory, Cardiovascular, Gastroenterology, Genitourinary, Integumentary, Musculoskeletal, Psychiatric were all negative except for pertinent positives noted in my HPI.    Objective:   BP 98/56   Pulse 94   Temp 98  F (36.7  C)   Resp 18   Ht 1.778 m (5' 10\")   Wt 52.4 kg (115 lb 8 oz)   SpO2 98%   BMI 16.57 kg/m    GENERAL APPEARANCE:  Alert, in no distress  ENT:  Mouth and posterior oropharynx normal, moist mucous membranes, normal hearing acuity  EYES:  EOM, conjunctivae, lids, pupils and irises normal, PERRL  RESP:  respiratory effort and palpation of chest normal, lungs clear to auscultation , no respiratory distress  CV:  Palpation and auscultation of heart done , regular rate and rhythm, no murmur, rub, or gallop, no edema  ABDOMEN:  normal bowel sounds, soft, nontender, no hepatosplenomegaly or other masses  M/S:   patient resting in bed  SKIN:  Inspection of skin and subcutaneous tissue baseline  NEURO:   speech wnl  PSYCH:  " affect and mood normal    Recent labs in [facility] reviewed by me today.  and   Most Recent 3 CBC's:No lab results found.  Most Recent 3 BMP's:Recent Labs   Lab Test 02/07/23  0000 02/02/22  0000 02/17/21  0000   .6 138.8 141.7   POTASSIUM 4.74 4.79 4.72   CHLORIDE 96.3* 98.4 99.8   CO2 35.0* 33.3* 32.8*   BUN 12  14.6 10  12.2 10  12.5   CR 0.82 0.82 0.80   MARGE 9.4 9.7 9.8   * 113* 127*       Assessment/Plan:  I71.40) Abdominal aortic aneurysm (AAA) without rupture, unspecified part (H)  (primary encounter diagnosis)  (Z98.890,  Z86.79) S/P AAA repair  (R53.81) Physical deconditioning  Comment: acute/ongoing, no change  Plan: PT and OT, tylenol 650mg q 4 hours prn  F/u with vascular surgery on 6/14/23 ordered ensure BID no other changes in orders     (R63.4) weight loss  (E44.0) moderate malnutrition  Acute/ongoing  Weight loss of ~15lbs in 3 weeks  Plan: dietician to follow, increase dietary supplements,  Started on remeron 7.5mg QD on 6/21/23     (D62) Anemia due to blood loss, acute  Comment: acute/ongoing, no change  Hgb 8.4 at discharge  Plan: Hgb 8.6 on 6/5/23, follow Hgb     (I25.10) Atherosclerosis of native coronary artery of native heart without angina pectoris  (I10) Essential hypertension, benign  Comment: acute/ongoing, no change  Plan: Bmp follow, ASA 81mg QD, no further Lasix and Bisoprolol         (J44.9) Chronic obstructive pulmonary disease, unspecified COPD type (H)  Comment: ongoing, no change  Plan: monitor SaO2 at rest and with activity, continue incruse ellipta inhaler 62.5mcg/act 1 puff daily, symbicort 160/4.5mcg 2 puffs BID, combivent 1 puff QID prn  F/u with pulmonology as directed     (K59.03) Drug-induced constipation  Comment: acute/ongoing, no change  Plan: continue senokot 8.6mg QD, miralax 17g QD    MED REC REQUIRED  Post Medication Reconciliation Status: medication reconcilation previously completed during another office visit      Orders:  remeron 7.5mg Qhs  BMP and CBC  on Monday      Electronically signed by: Tonya Lynn Haase, APRN CNP

## 2023-06-23 ENCOUNTER — TRANSITIONAL CARE UNIT VISIT (OUTPATIENT)
Dept: GERIATRICS | Facility: CLINIC | Age: 78
End: 2023-06-23
Payer: COMMERCIAL

## 2023-06-23 VITALS
SYSTOLIC BLOOD PRESSURE: 98 MMHG | BODY MASS INDEX: 16.54 KG/M2 | HEART RATE: 94 BPM | DIASTOLIC BLOOD PRESSURE: 56 MMHG | HEIGHT: 70 IN | OXYGEN SATURATION: 98 % | WEIGHT: 115.5 LBS | RESPIRATION RATE: 18 BRPM | TEMPERATURE: 98 F

## 2023-06-23 DIAGNOSIS — R53.81 PHYSICAL DECONDITIONING: ICD-10-CM

## 2023-06-23 DIAGNOSIS — D62 ANEMIA DUE TO BLOOD LOSS, ACUTE: ICD-10-CM

## 2023-06-23 DIAGNOSIS — R63.4 WEIGHT LOSS: ICD-10-CM

## 2023-06-23 DIAGNOSIS — I10 ESSENTIAL HYPERTENSION, BENIGN: ICD-10-CM

## 2023-06-23 DIAGNOSIS — I71.40 ABDOMINAL AORTIC ANEURYSM (AAA) WITHOUT RUPTURE, UNSPECIFIED PART (H): Primary | ICD-10-CM

## 2023-06-23 DIAGNOSIS — K59.03 DRUG-INDUCED CONSTIPATION: ICD-10-CM

## 2023-06-23 DIAGNOSIS — I25.10 ATHEROSCLEROSIS OF NATIVE CORONARY ARTERY OF NATIVE HEART WITHOUT ANGINA PECTORIS: ICD-10-CM

## 2023-06-23 DIAGNOSIS — J44.9 CHRONIC OBSTRUCTIVE PULMONARY DISEASE, UNSPECIFIED COPD TYPE (H): ICD-10-CM

## 2023-06-23 DIAGNOSIS — E44.0 MODERATE MALNUTRITION (H): ICD-10-CM

## 2023-06-23 DIAGNOSIS — Z98.890 S/P AAA REPAIR: ICD-10-CM

## 2023-06-23 DIAGNOSIS — Z86.79 S/P AAA REPAIR: ICD-10-CM

## 2023-06-23 PROCEDURE — 99310 SBSQ NF CARE HIGH MDM 45: CPT | Performed by: NURSE PRACTITIONER

## 2023-06-23 RX ORDER — MIRTAZAPINE 7.5 MG/1
7.5 TABLET, FILM COATED ORAL AT BEDTIME
Start: 2023-06-23 | End: 2023-06-27

## 2023-06-23 NOTE — LETTER
"    6/23/2023        RE: Roberto Carlos Still  [address] [address] [address]        [facility]    Chief Complaint   Patient presents with     Nursing Home Acute     HPI:  Roberto Carlos Still is a 78 year old  (1945), who is being seen today for an episodic care visit at: [facility]) [25]. Today's concern is:   AAA s/p repair: on exam today patient is resting in bed, denies pain or discomfort, states he is feeling well,  in therapy patient is walking 100-150 feet using a RW with CGA, patient is no longer doing therapy, wife is looking for placement in [place]   CAD/HTN: BP 98/56, 98/65, 99/66  with HR 70 range, denies CP, palpitations, SOB weight on admission was 131lbs and current weight is 115.5lbs weight trending up slightly  Failure to thrive and weight loss: weight loss as above, started on remeron 6/21/23  Constipation: denies constipation, states bowels are working  Anemia: Hgb 8.6 on 6/5/23 trending up    Allergies, and PMH/PSH reviewed in [facility] today.  REVIEW OF SYSTEMS:  10 point ROS of systems including Constitutional, Eyes, Respiratory, Cardiovascular, Gastroenterology, Genitourinary, Integumentary, Musculoskeletal, Psychiatric were all negative except for pertinent positives noted in my HPI.    Objective:   BP 98/56   Pulse 94   Temp 98  F (36.7  C)   Resp 18   Ht 1.778 m (5' 10\")   Wt 52.4 kg (115 lb 8 oz)   SpO2 98%   BMI 16.57 kg/m    GENERAL APPEARANCE:  Alert, in no distress  ENT:  Mouth and posterior oropharynx normal, moist mucous membranes, normal hearing acuity  EYES:  EOM, conjunctivae, lids, pupils and irises normal, PERRL  RESP:  respiratory effort and palpation of chest normal, lungs clear to auscultation , no respiratory distress  CV:  Palpation and auscultation of heart done , regular rate and rhythm, no murmur, rub, or gallop, no edema  ABDOMEN:  normal bowel sounds, soft, nontender, no hepatosplenomegaly or other masses  M/S:   patient " resting in bed  SKIN:  Inspection of skin and subcutaneous tissue baseline  NEURO:   speech wnl  PSYCH:  affect and mood normal    Recent labs in [facility] reviewed by me today.  and   Most Recent 3 CBC's:No lab results found.  Most Recent 3 BMP's:Recent Labs   Lab Test 02/07/23  0000 02/02/22  0000 02/17/21  0000   .6 138.8 141.7   POTASSIUM 4.74 4.79 4.72   CHLORIDE 96.3* 98.4 99.8   CO2 35.0* 33.3* 32.8*   BUN 12  14.6 10  12.2 10  12.5   CR 0.82 0.82 0.80   MARGE 9.4 9.7 9.8   * 113* 127*       Assessment/Plan:  I71.40) Abdominal aortic aneurysm (AAA) without rupture, unspecified part (H)  (primary encounter diagnosis)  (Z98.890,  Z86.79) S/P AAA repair  (R53.81) Physical deconditioning  Comment: acute/ongoing, no change  Plan: PT and OT, tylenol 650mg q 4 hours prn  F/u with vascular surgery on 6/14/23 ordered ensure BID no other changes in orders     (R63.4) weight loss  (E44.0) moderate malnutrition  Acute/ongoing  Weight loss of ~15lbs in 3 weeks  Plan: dietician to follow, increase dietary supplements,  Started on remeron 7.5mg QD on 6/21/23     (D62) Anemia due to blood loss, acute  Comment: acute/ongoing, no change  Hgb 8.4 at discharge  Plan: Hgb 8.6 on 6/5/23, follow Hgb     (I25.10) Atherosclerosis of native coronary artery of native heart without angina pectoris  (I10) Essential hypertension, benign  Comment: acute/ongoing, no change  Plan: Bmp follow, ASA 81mg QD, no further Lasix and Bisoprolol         (J44.9) Chronic obstructive pulmonary disease, unspecified COPD type (H)  Comment: ongoing, no change  Plan: monitor SaO2 at rest and with activity, continue incruse ellipta inhaler 62.5mcg/act 1 puff daily, symbicort 160/4.5mcg 2 puffs BID, combivent 1 puff QID prn  F/u with pulmonology as directed     (K59.03) Drug-induced constipation  Comment: acute/ongoing, no change  Plan: continue senokot 8.6mg QD, miralax 17g QD    MED REC REQUIRED  Post Medication Reconciliation Status: medication  reconcilation previously completed during another office visit      Orders:  remeron 7.5mg Qhs  BMP and CBC on Monday      Electronically signed by: Tonya Lynn Haase, APRN CNP           Sincerely,        Tonya Lynn Haase, APRN CNP

## 2023-06-26 ENCOUNTER — TELEPHONE (OUTPATIENT)
Dept: GERIATRICS | Facility: CLINIC | Age: 78
End: 2023-06-26
Payer: COMMERCIAL

## 2023-06-26 NOTE — PROGRESS NOTES
"[facility] GERIATRICS    Chief Complaint   Patient presents with     Nursing Home Acute     HPI:  Roberto Carlos Still is a 78 year old  (1945), who is being seen today for an episodic care visit at: [facility]) [25]. Today's concern is:   AAA s/p repair: on exam today patient is resting in bed, denies pain or discomfort, states he is feeling well, patient is walking 100-150 feet using a RW with CGA with nursing, no further therapy, wife is looking for placement in [place]   CAD/HTN: /66, 102/64, 128/76 with HR 80 range, denies CP, palpitations, SOB weight on admission was 131lbs and current weight is 116.8lbs weight trending up slightly  Failure to thrive and weight loss: weight loss as above, increase remeron to 15mg qhs   Constipation: denies constipation, states bowels are working  Anemia: Hgb 8.8 on 6/22/23 trending up slowly    Allergies, and PMH/PSH reviewed in [facility] today.  REVIEW OF SYSTEMS:  10 point ROS of systems including Constitutional, Eyes, Respiratory, Cardiovascular, Gastroenterology, Genitourinary, Integumentary, Musculoskeletal, Psychiatric were all negative except for pertinent positives noted in my HPI.    Objective:   /66   Pulse 90   Temp 97.4  F (36.3  C)   Resp 16   Ht 1.778 m (5' 10\")   Wt 52.2 kg (115 lb)   SpO2 92%   BMI 16.50 kg/m    GENERAL APPEARANCE:  Alert, in no distress, thin  ENT:  Mouth and posterior oropharynx normal, moist mucous membranes, normal hearing acuity  EYES:  EOM, conjunctivae, lids, pupils and irises normal, PERRL  RESP:  respiratory effort and palpation of chest normal, lungs clear to auscultation , no respiratory distress  CV:  Palpation and auscultation of heart done , regular rate and rhythm, no murmur, rub, or gallop, no edema  ABDOMEN:  normal bowel sounds, soft, nontender, no hepatosplenomegaly or other masses  M/S:   Examination of:   patient resting in bed  .  SKIN:  Inspection of skin and subcutaneous tissue baseline, did " not visualize incisions or wounds  NEURO:   speech wnl  PSYCH:  affect and mood normal    Recent labs in [facility] reviewed by me today.  and   Most Recent 3 CBC's:No lab results found.  Most Recent 3 BMP's:Recent Labs   Lab Test 02/07/23  0000 02/02/22  0000 02/17/21  0000   .6 138.8 141.7   POTASSIUM 4.74 4.79 4.72   CHLORIDE 96.3* 98.4 99.8   CO2 35.0* 33.3* 32.8*   BUN 12  14.6 10  12.2 10  12.5   CR 0.82 0.82 0.80   MARGE 9.4 9.7 9.8   * 113* 127*       Assessment/Plan:  I71.40) Abdominal aortic aneurysm (AAA) without rupture, unspecified part (H)  (primary encounter diagnosis)  (Z98.890,  Z86.79) S/P AAA repair  (R53.81) Physical deconditioning  Comment: acute/ongoing, no change  Plan: PT and OT, tylenol 650mg q 4 hours prn  F/u with vascular surgery on 6/14/23 ordered ensure BID no other changes in orders     (R63.4) weight loss  (E44.0) moderate malnutrition  Acute/ongoing  Weight loss of ~15lbs in 3 weeks  Plan: dietician to follow, continue dietary supplements,  Increase remeron to 15mg QD on 6/21/23     (D62) Anemia due to blood loss, acute  Comment: acute/ongoing, no change  Hgb 8.4 at discharge  Plan: Hgb 8.8 on 6/22/23, follow Hgb     (I25.10) Atherosclerosis of native coronary artery of native heart without angina pectoris  (I10) Essential hypertension, benign  Comment: acute/ongoing, no change  Plan: Bmp follow, ASA 81mg QD, no further Lasix and Bisoprolol         (J44.9) Chronic obstructive pulmonary disease, unspecified COPD type (H)  Comment: ongoing, no change  Plan: monitor SaO2 at rest and with activity, continue incruse ellipta inhaler 62.5mcg/act 1 puff daily, symbicort 160/4.5mcg 2 puffs BID, combivent 1 puff QID prn  F/u with pulmonology as directed     (K59.03) Drug-induced constipation  Comment: acute/ongoing, no change  Plan: continue senokot 8.6mg QD, miralax 17g QD    MED REC REQUIRED  Post Medication Reconciliation Status: medication reconcilation previously completed during  another office visit      Orders:  Increase remeron to 15mg qhs      Electronically signed by: Tonya Lynn Haase, APRN CNP

## 2023-06-27 ENCOUNTER — TRANSITIONAL CARE UNIT VISIT (OUTPATIENT)
Dept: GERIATRICS | Facility: CLINIC | Age: 78
End: 2023-06-27
Payer: COMMERCIAL

## 2023-06-27 VITALS
BODY MASS INDEX: 16.46 KG/M2 | HEIGHT: 70 IN | OXYGEN SATURATION: 92 % | SYSTOLIC BLOOD PRESSURE: 116 MMHG | RESPIRATION RATE: 16 BRPM | DIASTOLIC BLOOD PRESSURE: 66 MMHG | HEART RATE: 90 BPM | WEIGHT: 115 LBS | TEMPERATURE: 97.4 F

## 2023-06-27 DIAGNOSIS — R63.4 WEIGHT LOSS: ICD-10-CM

## 2023-06-27 DIAGNOSIS — Z86.79 S/P AAA REPAIR: ICD-10-CM

## 2023-06-27 DIAGNOSIS — R53.81 PHYSICAL DECONDITIONING: ICD-10-CM

## 2023-06-27 DIAGNOSIS — E44.0 MODERATE MALNUTRITION (H): ICD-10-CM

## 2023-06-27 DIAGNOSIS — Z98.890 S/P AAA REPAIR: ICD-10-CM

## 2023-06-27 DIAGNOSIS — K59.03 DRUG-INDUCED CONSTIPATION: ICD-10-CM

## 2023-06-27 DIAGNOSIS — I71.40 ABDOMINAL AORTIC ANEURYSM (AAA) WITHOUT RUPTURE, UNSPECIFIED PART (H): Primary | ICD-10-CM

## 2023-06-27 DIAGNOSIS — I10 ESSENTIAL HYPERTENSION, BENIGN: ICD-10-CM

## 2023-06-27 DIAGNOSIS — I25.10 ATHEROSCLEROSIS OF NATIVE CORONARY ARTERY OF NATIVE HEART WITHOUT ANGINA PECTORIS: ICD-10-CM

## 2023-06-27 DIAGNOSIS — D62 ANEMIA DUE TO BLOOD LOSS, ACUTE: ICD-10-CM

## 2023-06-27 DIAGNOSIS — J44.9 CHRONIC OBSTRUCTIVE PULMONARY DISEASE, UNSPECIFIED COPD TYPE (H): ICD-10-CM

## 2023-06-27 PROCEDURE — 99310 SBSQ NF CARE HIGH MDM 45: CPT | Performed by: NURSE PRACTITIONER

## 2023-06-27 RX ORDER — MIRTAZAPINE 7.5 MG/1
15 TABLET, FILM COATED ORAL AT BEDTIME
Start: 2023-06-27

## 2023-06-27 NOTE — TELEPHONE ENCOUNTER
[facility] Geriatrics Telephone Encounter    HPI: 77 yo PMH HTN< HLD, COPD on home O2 with tobacco use, falls    Reason for Call: Nursing reporting lab work, remarkable values include Hgb 8.9, SOdim 147, Cr 0.91. No new symptoms to report. Chronically poor oral intakes. VSS    Onset: unchanged     Symptoms: NA     Interventions Tried: encourage fluids     A/P: routine lab work, relatively stable, Cr more or less at baseline, looks dry with mild hyponatremia.     Imaging Ordered: No    Labs Ordered: Yes    Medication Ordered:  No     Sent to ED:  No    Orders  Roberto Carlos Still  1945     1. Continue to push fluids  2. Recheck BMP 6/28 dx: hypernatremia    JACKY Ramos CNP on 6/26/2023 at 7:47 PM

## 2023-06-27 NOTE — LETTER
"    6/27/2023        RE: Roberto Carlos Still  [address] [address] [address]        [facility]    Chief Complaint   Patient presents with     Nursing Home Acute     HPI:  Roberto Carlos Still is a 78 year old  (1945), who is being seen today for an episodic care visit at: [facility]) [25]. Today's concern is:   AAA s/p repair: on exam today patient is resting in bed, denies pain or discomfort, states he is feeling well, patient is walking 100-150 feet using a RW with CGA with nursing, no further therapy, [facility]   CAD/HTN: /66, 102/64, 128/76 with HR 80 range, denies CP, palpitations, SOB weight on admission was 131lbs and current weight is 116.8lbs weight trending up slightly  Failure to thrive and weight loss: weight loss as above, increase remeron to 15mg qhs   Constipation: denies constipation, states bowels are working  Anemia: Hgb 8.8 on 6/22/23 trending up slowly    Allergies, and PMH/PSH reviewed in [facility] today.  REVIEW OF SYSTEMS:  10 point ROS of systems including Constitutional, Eyes, Respiratory, Cardiovascular, Gastroenterology, Genitourinary, Integumentary, Musculoskeletal, Psychiatric were all negative except for pertinent positives noted in my HPI.    Objective:   /66   Pulse 90   Temp 97.4  F (36.3  C)   Resp 16   Ht 1.778 m (5' 10\")   Wt 52.2 kg (115 lb)   SpO2 92%   BMI 16.50 kg/m    GENERAL APPEARANCE:  Alert, in no distress, thin  ENT:  Mouth and posterior oropharynx normal, moist mucous membranes, normal hearing acuity  EYES:  EOM, conjunctivae, lids, pupils and irises normal, PERRL  RESP:  respiratory effort and palpation of chest normal, lungs clear to auscultation , no respiratory distress  CV:  Palpation and auscultation of heart done , regular rate and rhythm, no murmur, rub, or gallop, no edema  ABDOMEN:  normal bowel sounds, soft, nontender, no hepatosplenomegaly or other masses  M/S:   " Examination of:   patient resting in bed  .  SKIN:  Inspection of skin and subcutaneous tissue baseline, did not visualize incisions or wounds  NEURO:   speech wnl  PSYCH:  affect and mood normal    Recent labs in [facility] reviewed by me today.  and   Most Recent 3 CBC's:No lab results found.  Most Recent 3 BMP's:Recent Labs   Lab Test 02/07/23  0000 02/02/22  0000 02/17/21  0000   .6 138.8 141.7   POTASSIUM 4.74 4.79 4.72   CHLORIDE 96.3* 98.4 99.8   CO2 35.0* 33.3* 32.8*   BUN 12  14.6 10  12.2 10  12.5   CR 0.82 0.82 0.80   MARGE 9.4 9.7 9.8   * 113* 127*       Assessment/Plan:  I71.40) Abdominal aortic aneurysm (AAA) without rupture, unspecified part (H)  (primary encounter diagnosis)  (Z98.890,  Z86.79) S/P AAA repair  (R53.81) Physical deconditioning  Comment: acute/ongoing, no change  Plan: PT and OT, tylenol 650mg q 4 hours prn  F/u with vascular surgery on 6/14/23 ordered ensure BID no other changes in orders     (R63.4) weight loss  (E44.0) moderate malnutrition  Acute/ongoing  Weight loss of ~15lbs in 3 weeks  Plan: dietician to follow, continue dietary supplements,  Increase remeron to 15mg QD on 6/21/23     (D62) Anemia due to blood loss, acute  Comment: acute/ongoing, no change  Hgb 8.4 at discharge  Plan: Hgb 8.8 on 6/22/23, follow Hgb     (I25.10) Atherosclerosis of native coronary artery of native heart without angina pectoris  (I10) Essential hypertension, benign  Comment: acute/ongoing, no change  Plan: Bmp follow, ASA 81mg QD, no further Lasix and Bisoprolol         (J44.9) Chronic obstructive pulmonary disease, unspecified COPD type (H)  Comment: ongoing, no change  Plan: monitor SaO2 at rest and with activity, continue incruse ellipta inhaler 62.5mcg/act 1 puff daily, symbicort 160/4.5mcg 2 puffs BID, combivent 1 puff QID prn  F/u with pulmonology as directed     (K59.03) Drug-induced constipation  Comment: acute/ongoing, no change  Plan: continue senokot 8.6mg QD, miralax 17g  QD    MED REC REQUIRED  Post Medication Reconciliation Status: medication reconcilation previously completed during another office visit      Orders:  Increase remeron to 15mg qhs      Electronically signed by: Tonya Lynn Haase, APRN CNP           Sincerely,        Tonya Lynn Haase, APRN CNP

## 2023-06-28 ENCOUNTER — TELEPHONE (OUTPATIENT)
Dept: GERIATRICS | Facility: CLINIC | Age: 78
End: 2023-06-28
Payer: COMMERCIAL

## 2023-06-28 NOTE — TELEPHONE ENCOUNTER
[facility] Geriatrics Lab Note     Provider: Tonya Haase, APRN CNP  Facility: [facility] Facility Type:  TCU    Allergies   Allergen Reactions     Cephalexin Rash       Labs Reviewed by provider: Heme 2, BMP     Verbal Order/Direction given by Provider: No new orders.      Provider giving Order:  Tonya Haase, APRN CNP    Verbal Order given to: Asher([phone])    Artur Ray RN

## 2023-06-29 ENCOUNTER — TRANSITIONAL CARE UNIT VISIT (OUTPATIENT)
Dept: GERIATRICS | Facility: CLINIC | Age: 78
End: 2023-06-29
Payer: COMMERCIAL

## 2023-06-29 VITALS
HEIGHT: 70 IN | SYSTOLIC BLOOD PRESSURE: 118 MMHG | RESPIRATION RATE: 18 BRPM | BODY MASS INDEX: 15.6 KG/M2 | DIASTOLIC BLOOD PRESSURE: 70 MMHG | TEMPERATURE: 98.1 F | WEIGHT: 109 LBS | OXYGEN SATURATION: 93 % | HEART RATE: 99 BPM

## 2023-06-29 DIAGNOSIS — E44.0 MODERATE MALNUTRITION (H): ICD-10-CM

## 2023-06-29 DIAGNOSIS — J44.9 CHRONIC OBSTRUCTIVE PULMONARY DISEASE, UNSPECIFIED COPD TYPE (H): ICD-10-CM

## 2023-06-29 DIAGNOSIS — I25.10 ATHEROSCLEROSIS OF NATIVE CORONARY ARTERY OF NATIVE HEART WITHOUT ANGINA PECTORIS: ICD-10-CM

## 2023-06-29 DIAGNOSIS — I10 ESSENTIAL HYPERTENSION, BENIGN: ICD-10-CM

## 2023-06-29 DIAGNOSIS — K59.03 DRUG-INDUCED CONSTIPATION: ICD-10-CM

## 2023-06-29 DIAGNOSIS — Z98.890 S/P AAA REPAIR: ICD-10-CM

## 2023-06-29 DIAGNOSIS — Z71.89 ADVANCED DIRECTIVES, COUNSELING/DISCUSSION: ICD-10-CM

## 2023-06-29 DIAGNOSIS — R63.4 WEIGHT LOSS: ICD-10-CM

## 2023-06-29 DIAGNOSIS — D62 ANEMIA DUE TO BLOOD LOSS, ACUTE: ICD-10-CM

## 2023-06-29 DIAGNOSIS — R53.81 PHYSICAL DECONDITIONING: ICD-10-CM

## 2023-06-29 DIAGNOSIS — I71.40 ABDOMINAL AORTIC ANEURYSM (AAA) WITHOUT RUPTURE, UNSPECIFIED PART (H): Primary | ICD-10-CM

## 2023-06-29 DIAGNOSIS — Z86.79 S/P AAA REPAIR: ICD-10-CM

## 2023-06-29 PROCEDURE — 99310 SBSQ NF CARE HIGH MDM 45: CPT | Performed by: NURSE PRACTITIONER

## 2023-06-29 NOTE — LETTER
"    6/29/2023        RE: Roberto Carlos Still  [address] [address] [address]        M [facility] GERIATRICS    Chief Complaint   Patient presents with     RECHECK     HPI:  Roberto Carlos Still is a 78 year old  (1945), who is being seen today for an episodic care visit at: [facility]) [25]. Today's concern is:   AAA s/p repair: on exam today patient is resting in bed, denies pain or discomfort, patient is not in therapy, on a walking program with nursing but is walking very little, wife is looking for placement in [place]   Advanced directives discussion: spoke at length to wife on phone, discussed with patient at bedside ongoing weight loss, goals moving forward, patient and family have agreed to consult hospice  CAD/HTN: /70, 116/76, 102/64 with HR 80 range, denies CP, palpitations, SOB weight on admission was 131lbs and current weight is 109lbs trending down  Failure to thrive and weight loss: ongoing weight loss, trial of remeron wife requested to DC after one week she felt the medication was making appetite worse, hospice referral placed  Constipation: denies constipation, states bowels are working  Anemia: Hgb 8.9 on 6/26/23 trending up slowly    Allergies, and PMH/PSH reviewed in [facility] today.  REVIEW OF SYSTEMS:  10 point ROS of systems including Constitutional, Eyes, Respiratory, Cardiovascular, Gastroenterology, Genitourinary, Integumentary, Musculoskeletal, Psychiatric were all negative except for pertinent positives noted in my HPI.    Objective:   /70   Pulse 99   Temp 98.1  F (36.7  C)   Resp 18   Ht 1.778 m (5' 10\")   Wt 49.4 kg (109 lb)   SpO2 93%   BMI 15.64 kg/m    GENERAL APPEARANCE:  Alert, in no distress, thin  ENT:  Mouth and posterior oropharynx normal, moist mucous membranes, normal hearing acuity  EYES:  EOM, conjunctivae, lids, pupils and irises normal, PERRL  RESP:  respiratory effort and palpation of chest normal, lungs clear to auscultation , " no respiratory distress  CV:  Palpation and auscultation of heart done , regular rate and rhythm, no murmur, rub, or gallop, no edema  ABDOMEN:  normal bowel sounds, soft, nontender, no hepatosplenomegaly or other masses  M/S:   patient resting in bed  SKIN:  Inspection of skin and subcutaneous tissue baseline, did not visualize surgical incision  NEURO:   speech wnl  PSYCH:  affect and mood normal    Recent labs in [facility] reviewed by me today.  and   Most Recent 3 CBC's:No lab results found.  Most Recent 3 BMP's:Recent Labs   Lab Test 02/07/23  0000 02/02/22  0000 02/17/21  0000   .6 138.8 141.7   POTASSIUM 4.74 4.79 4.72   CHLORIDE 96.3* 98.4 99.8   CO2 35.0* 33.3* 32.8*   BUN 12  14.6 10  12.2 10  12.5   CR 0.82 0.82 0.80   MARGE 9.4 9.7 9.8   * 113* 127*       Assessment/Plan:  I71.40) Abdominal aortic aneurysm (AAA) without rupture, unspecified part (H)  (primary encounter diagnosis)  (Z98.890,  Z86.79) S/P AAA repair  (R53.81) Physical deconditioning  Comment: acute/ongoing, no change  Plan: PT and OT, tylenol 650mg q 4 hours prn  F/u with vascular surgery on 6/14/23 ordered ensure BID no other changes in orders     (R63.4) weight loss  (E44.0) moderate malnutrition  Acute/ongoing patient weight down to 109.7lbs  Weight loss of ~20lbs in 4 weeks  Plan: dietician to follow, continue dietary supplements,  Patient wife requested DC remeron    (Z71.89) advanced directives discussion  See above discussed at length ongoing weight loss, poor appetite, trialed remeron wife requested DC after one week.   Plan: OK for hospice referral     (D62) Anemia due to blood loss, acute  Comment: acute/ongoing, no change  Hgb 8.4 at discharge  Plan: Hgb 8.6 on 6/28/23, follow Hgb     (I25.10) Atherosclerosis of native coronary artery of native heart without angina pectoris  (I10) Essential hypertension, benign  Comment: acute/ongoing, no change  Plan: Bmp follow, ASA 81mg QD, no further Lasix and Bisoprolol          (J44.9) Chronic obstructive pulmonary disease, unspecified COPD type (H)  Comment: ongoing, no change  Plan: monitor SaO2 at rest and with activity, continue incruse ellipta inhaler 62.5mcg/act 1 puff daily, symbicort 160/4.5mcg 2 puffs BID, combivent 1 puff QID prn  F/u with pulmonology as directed     (K59.03) Drug-induced constipation  Comment: acute/ongoing, no change  Plan: continue senokot 8.6mg QD, miralax 17g QD     MED REC REQUIRED  Post Medication Reconciliation Status: medication reconcilation previously completed during another office visit          MED REC REQUIRED  Post Medication Reconciliation Status: medication reconcilation previously completed during another office visit      Orders:  OK for hospice consult      Electronically signed by: Tonya Lynn Haase, APRN CNP             Sincerely,        Tonya Lynn Haase, APRN CNP

## 2023-06-29 NOTE — PROGRESS NOTES
"[facility] GERIATRICS    Chief Complaint   Patient presents with     RECHECK     HPI:  Roberto Carlos Still is a 78 year old  (1945), who is being seen today for an episodic care visit at: [facility]) [25]. Today's concern is:   AAA s/p repair: on exam today patient is resting in bed, denies pain or discomfort, patient is not in therapy, on a walking program with nursing but is walking very little, wife is looking for placement in [place]   Advanced directives discussion: spoke at length to wife on phone, discussed with patient at bedside ongoing weight loss, goals moving forward, patient and family have agreed to consult hospice  CAD/HTN: /70, 116/76, 102/64 with HR 80 range, denies CP, palpitations, SOB weight on admission was 131lbs and current weight is 109lbs trending down  Failure to thrive and weight loss: ongoing weight loss, trial of remeron wife requested to DC after one week she felt the medication was making appetite worse, hospice referral placed  Constipation: denies constipation, states bowels are working  Anemia: Hgb 8.9 on 6/26/23 trending up slowly    Allergies, and PMH/PSH reviewed in [facility] today.  REVIEW OF SYSTEMS:  10 point ROS of systems including Constitutional, Eyes, Respiratory, Cardiovascular, Gastroenterology, Genitourinary, Integumentary, Musculoskeletal, Psychiatric were all negative except for pertinent positives noted in my HPI.    Objective:   /70   Pulse 99   Temp 98.1  F (36.7  C)   Resp 18   Ht 1.778 m (5' 10\")   Wt 49.4 kg (109 lb)   SpO2 93%   BMI 15.64 kg/m    GENERAL APPEARANCE:  Alert, in no distress, thin  ENT:  Mouth and posterior oropharynx normal, moist mucous membranes, normal hearing acuity  EYES:  EOM, conjunctivae, lids, pupils and irises normal, PERRL  RESP:  respiratory effort and palpation of chest normal, lungs clear to auscultation , no respiratory distress  CV:  Palpation and auscultation of heart done , regular rate and rhythm, " no murmur, rub, or gallop, no edema  ABDOMEN:  normal bowel sounds, soft, nontender, no hepatosplenomegaly or other masses  M/S:   patient resting in bed  SKIN:  Inspection of skin and subcutaneous tissue baseline, did not visualize surgical incision  NEURO:   speech wnl  PSYCH:  affect and mood normal    Recent labs in [facility] reviewed by me today.  and   Most Recent 3 CBC's:No lab results found.  Most Recent 3 BMP's:Recent Labs   Lab Test 02/07/23  0000 02/02/22  0000 02/17/21  0000   .6 138.8 141.7   POTASSIUM 4.74 4.79 4.72   CHLORIDE 96.3* 98.4 99.8   CO2 35.0* 33.3* 32.8*   BUN 12  14.6 10  12.2 10  12.5   CR 0.82 0.82 0.80   MARGE 9.4 9.7 9.8   * 113* 127*       Assessment/Plan:  I71.40) Abdominal aortic aneurysm (AAA) without rupture, unspecified part (H)  (primary encounter diagnosis)  (Z98.890,  Z86.79) S/P AAA repair  (R53.81) Physical deconditioning  Comment: acute/ongoing, no change  Plan: PT and OT, tylenol 650mg q 4 hours prn  F/u with vascular surgery on 6/14/23 ordered ensure BID no other changes in orders     (R63.4) weight loss  (E44.0) moderate malnutrition  Acute/ongoing patient weight down to 109.7lbs  Weight loss of ~20lbs in 4 weeks  Plan: dietician to follow, continue dietary supplements,  Patient wife requested DC remeron    (Z71.89) advanced directives discussion  See above discussed at length ongoing weight loss, poor appetite, trialed remeron wife requested DC after one week.   Plan: OK for hospice referral     (D62) Anemia due to blood loss, acute  Comment: acute/ongoing, no change  Hgb 8.4 at discharge  Plan: Hgb 8.6 on 6/28/23, follow Hgb     (I25.10) Atherosclerosis of native coronary artery of native heart without angina pectoris  (I10) Essential hypertension, benign  Comment: acute/ongoing, no change  Plan: Bmp follow, ASA 81mg QD, no further Lasix and Bisoprolol         (J44.9) Chronic obstructive pulmonary disease, unspecified COPD type (H)  Comment: ongoing, no  change  Plan: monitor SaO2 at rest and with activity, continue incruse ellipta inhaler 62.5mcg/act 1 puff daily, symbicort 160/4.5mcg 2 puffs BID, combivent 1 puff QID prn  F/u with pulmonology as directed     (K59.03) Drug-induced constipation  Comment: acute/ongoing, no change  Plan: continue senokot 8.6mg QD, miralax 17g QD     MED REC REQUIRED  Post Medication Reconciliation Status: medication reconcilation previously completed during another office visit          MED REC REQUIRED  Post Medication Reconciliation Status: medication reconcilation previously completed during another office visit      Orders:  OK for hospice consult      Electronically signed by: Tonya Lynn Haase, APRN CNP

## 2023-07-05 ENCOUNTER — TRANSITIONAL CARE UNIT VISIT (OUTPATIENT)
Dept: GERIATRICS | Facility: CLINIC | Age: 78
End: 2023-07-05
Payer: COMMERCIAL

## 2023-07-05 VITALS
RESPIRATION RATE: 16 BRPM | WEIGHT: 109 LBS | BODY MASS INDEX: 15.6 KG/M2 | OXYGEN SATURATION: 97 % | HEIGHT: 70 IN | SYSTOLIC BLOOD PRESSURE: 107 MMHG | HEART RATE: 98 BPM | DIASTOLIC BLOOD PRESSURE: 62 MMHG | TEMPERATURE: 97.4 F

## 2023-07-05 DIAGNOSIS — I10 ESSENTIAL HYPERTENSION, BENIGN: ICD-10-CM

## 2023-07-05 DIAGNOSIS — R53.81 PHYSICAL DECONDITIONING: ICD-10-CM

## 2023-07-05 DIAGNOSIS — Z86.79 S/P AAA REPAIR: ICD-10-CM

## 2023-07-05 DIAGNOSIS — R63.4 WEIGHT LOSS: ICD-10-CM

## 2023-07-05 DIAGNOSIS — I71.40 ABDOMINAL AORTIC ANEURYSM (AAA) WITHOUT RUPTURE, UNSPECIFIED PART (H): Primary | ICD-10-CM

## 2023-07-05 DIAGNOSIS — I25.10 ATHEROSCLEROSIS OF NATIVE CORONARY ARTERY OF NATIVE HEART WITHOUT ANGINA PECTORIS: ICD-10-CM

## 2023-07-05 DIAGNOSIS — Z98.890 S/P AAA REPAIR: ICD-10-CM

## 2023-07-05 DIAGNOSIS — D62 ANEMIA DUE TO BLOOD LOSS, ACUTE: ICD-10-CM

## 2023-07-05 DIAGNOSIS — J44.9 CHRONIC OBSTRUCTIVE PULMONARY DISEASE, UNSPECIFIED COPD TYPE (H): ICD-10-CM

## 2023-07-05 DIAGNOSIS — E44.0 MODERATE MALNUTRITION (H): ICD-10-CM

## 2023-07-05 DIAGNOSIS — K59.03 DRUG-INDUCED CONSTIPATION: ICD-10-CM

## 2023-07-05 PROCEDURE — 99310 SBSQ NF CARE HIGH MDM 45: CPT | Performed by: NURSE PRACTITIONER

## 2023-07-05 NOTE — LETTER
"    7/5/2023        RE: Roberto Carlos Still  [address] [address] [address]        [facility]    Chief Complaint   Patient presents with     RECHECK     HPI:  Roberto Carlos Still is a 78 year old  (1945), who is being seen today for an episodic care visit at: [facility]) [25]. Today's concern is:   AAA s/p repair: on exam today patient is resting in bed, denies pain or discomfort, patient is not in therapy, on a walking program with nursing but is walking very little, wife continues to look for placement for patient  CAD/HTN: /62, 100/68, 108/70 with HR 90 range, denies CP, palpitations, SOB weight on admission was 131lbs and current weight is 109.9 lbs   Failure to thrive and weight loss: ongoing weight loss, patient states he is eating the food that tastes good to him, wife is bringing in foods, he is drinking supplements  Constipation: denies constipation, states bowels are working  Anemia: Hgb 8.9 on 6/26/23 trending up slowly    Allergies, and PMH/PSH reviewed in [facility] today.  REVIEW OF SYSTEMS:  10 point ROS of systems including Constitutional, Eyes, Respiratory, Cardiovascular, Gastroenterology, Genitourinary, Integumentary, Musculoskeletal, Psychiatric were all negative except for pertinent positives noted in my HPI.    Objective:   /62   Pulse 98   Temp 97.4  F (36.3  C)   Resp 16   Ht 1.778 m (5' 10\")   Wt 49.4 kg (109 lb)   SpO2 97%   BMI 15.64 kg/m    GENERAL APPEARANCE:  Alert, in no distress, thin  ENT:  Mouth and posterior oropharynx normal, moist mucous membranes, normal hearing acuity  EYES:  EOM, conjunctivae, lids, pupils and irises normal, PERRL  RESP:  respiratory effort and palpation of chest normal, lungs clear to auscultation , no respiratory distress  CV:  Palpation and auscultation of heart done , regular rate and rhythm, no murmur, rub, or gallop, no edema  ABDOMEN:  normal bowel sounds, soft, nontender, no hepatosplenomegaly " or other masses  M/S:   patient resting in bed  SKIN:  Inspection of skin and subcutaneous tissue baseline  NEURO:   speech wnl  PSYCH:  affect and mood normal    Recent labs in [facility] reviewed by me today.  and   Most Recent 3 CBC's:No lab results found.  Most Recent 3 BMP's:Recent Labs   Lab Test 02/07/23  0000 02/02/22  0000 02/17/21  0000   .6 138.8 141.7   POTASSIUM 4.74 4.79 4.72   CHLORIDE 96.3* 98.4 99.8   CO2 35.0* 33.3* 32.8*   BUN 12  14.6 10  12.2 10  12.5   CR 0.82 0.82 0.80   MARGE 9.4 9.7 9.8   * 113* 127*       Assessment/Plan:  I71.40) Abdominal aortic aneurysm (AAA) without rupture, unspecified part (H)  (primary encounter diagnosis)  (Z98.890,  Z86.79) S/P AAA repair  (R53.81) Physical deconditioning  Comment: acute/ongoing, no change  Plan: PT and OT, tylenol 650mg q 4 hours prn  F/u with vascular surgery as directed     (R63.4) weight loss  (E44.0) moderate malnutrition  Acute/ongoing patient weight down to 109.7lbs  Weight loss of ~20lbs in 4 weeks  Plan: dietician to follow, continue dietary supplements,  Hospice consult ordered 6/28/23      (D62) Anemia due to blood loss, acute  Comment: acute/ongoing, no change  Hgb 8.4 at discharge  Plan: Hgb 8.6 on 6/28/23, follow Hgb     (I25.10) Atherosclerosis of native coronary artery of native heart without angina pectoris  (I10) Essential hypertension, benign  Comment: acute/ongoing, no change  Plan: Bmp follow, ASA 81mg QD, no further Lasix and Bisoprolol         (J44.9) Chronic obstructive pulmonary disease, unspecified COPD type (H)  Comment: ongoing, no change  Plan: monitor SaO2 at rest and with activity, continue incruse ellipta inhaler 62.5mcg/act 1 puff daily, symbicort 160/4.5mcg 2 puffs BID, combivent 1 puff QID prn  F/u with pulmonology as directed     (K59.03) Drug-induced constipation  Comment: acute/ongoing, no change  Plan: continue senokot 8.6mg QD, miralax 17g QD    MED REC REQUIRED  Post Medication Reconciliation Status:  medication reconcilation previously completed during another office visit      Orders:   no new orders      Electronically signed by: Tonya Lynn Haase, APRN CNP             Sincerely,        Tonya Lynn Haase, APRN CNP

## 2023-07-05 NOTE — PROGRESS NOTES
"[facility] GERIATRICS    Chief Complaint   Patient presents with     RECHECK     HPI:  Roberto Carlos Still is a 78 year old  (1945), who is being seen today for an episodic care visit at: [facility]) [25]. Today's concern is:   AAA s/p repair: on exam today patient is resting in bed, denies pain or discomfort, patient is not in therapy, on a walking program with nursing but is walking very little, wife continues to look for placement for patient  CAD/HTN: /62, 100/68, 108/70 with HR 90 range, denies CP, palpitations, SOB weight on admission was 131lbs and current weight is 109.9 lbs   Failure to thrive and weight loss: ongoing weight loss, patient states he is eating the food that tastes good to him, wife is bringing in foods, he is drinking supplements  Constipation: denies constipation, states bowels are working  Anemia: Hgb 8.9 on 6/26/23 trending up slowly    Allergies, and PMH/PSH reviewed in [facility] today.  REVIEW OF SYSTEMS:  10 point ROS of systems including Constitutional, Eyes, Respiratory, Cardiovascular, Gastroenterology, Genitourinary, Integumentary, Musculoskeletal, Psychiatric were all negative except for pertinent positives noted in my HPI.    Objective:   /62   Pulse 98   Temp 97.4  F (36.3  C)   Resp 16   Ht 1.778 m (5' 10\")   Wt 49.4 kg (109 lb)   SpO2 97%   BMI 15.64 kg/m    GENERAL APPEARANCE:  Alert, in no distress, thin  ENT:  Mouth and posterior oropharynx normal, moist mucous membranes, normal hearing acuity  EYES:  EOM, conjunctivae, lids, pupils and irises normal, PERRL  RESP:  respiratory effort and palpation of chest normal, lungs clear to auscultation , no respiratory distress  CV:  Palpation and auscultation of heart done , regular rate and rhythm, no murmur, rub, or gallop, no edema  ABDOMEN:  normal bowel sounds, soft, nontender, no hepatosplenomegaly or other masses  M/S:   patient resting in bed  SKIN:  Inspection of skin and subcutaneous tissue " baseline  NEURO:   speech wnl  PSYCH:  affect and mood normal    Recent labs in [facility] reviewed by me today.  and   Most Recent 3 CBC's:No lab results found.  Most Recent 3 BMP's:Recent Labs   Lab Test 02/07/23  0000 02/02/22  0000 02/17/21  0000   .6 138.8 141.7   POTASSIUM 4.74 4.79 4.72   CHLORIDE 96.3* 98.4 99.8   CO2 35.0* 33.3* 32.8*   BUN 12  14.6 10  12.2 10  12.5   CR 0.82 0.82 0.80   MARGE 9.4 9.7 9.8   * 113* 127*       Assessment/Plan:  I71.40) Abdominal aortic aneurysm (AAA) without rupture, unspecified part (H)  (primary encounter diagnosis)  (Z98.890,  Z86.79) S/P AAA repair  (R53.81) Physical deconditioning  Comment: acute/ongoing, no change  Plan: PT and OT, tylenol 650mg q 4 hours prn  F/u with vascular surgery as directed     (R63.4) weight loss  (E44.0) moderate malnutrition  Acute/ongoing patient weight down to 109.7lbs  Weight loss of ~20lbs in 4 weeks  Plan: dietician to follow, continue dietary supplements,  Hospice consult ordered 6/28/23      (D62) Anemia due to blood loss, acute  Comment: acute/ongoing, no change  Hgb 8.4 at discharge  Plan: Hgb 8.6 on 6/28/23, follow Hgb     (I25.10) Atherosclerosis of native coronary artery of native heart without angina pectoris  (I10) Essential hypertension, benign  Comment: acute/ongoing, no change  Plan: Bmp follow, ASA 81mg QD, no further Lasix and Bisoprolol         (J44.9) Chronic obstructive pulmonary disease, unspecified COPD type (H)  Comment: ongoing, no change  Plan: monitor SaO2 at rest and with activity, continue incruse ellipta inhaler 62.5mcg/act 1 puff daily, symbicort 160/4.5mcg 2 puffs BID, combivent 1 puff QID prn  F/u with pulmonology as directed     (K59.03) Drug-induced constipation  Comment: acute/ongoing, no change  Plan: continue senokot 8.6mg QD, miralax 17g QD    MED REC REQUIRED  Post Medication Reconciliation Status: medication reconcilation previously completed during another office visit      Orders:   no new  orders      Electronically signed by: Tonya Lynn Haase, APRN CNP

## 2023-07-12 ENCOUNTER — TRANSITIONAL CARE UNIT VISIT (OUTPATIENT)
Dept: GERIATRICS | Facility: CLINIC | Age: 78
End: 2023-07-12
Payer: COMMERCIAL

## 2023-07-12 VITALS
RESPIRATION RATE: 18 BRPM | BODY MASS INDEX: 16.03 KG/M2 | HEIGHT: 70 IN | TEMPERATURE: 98.1 F | WEIGHT: 112 LBS | SYSTOLIC BLOOD PRESSURE: 134 MMHG | OXYGEN SATURATION: 97 % | HEART RATE: 98 BPM | DIASTOLIC BLOOD PRESSURE: 72 MMHG

## 2023-07-12 DIAGNOSIS — Z86.79 S/P AAA REPAIR: ICD-10-CM

## 2023-07-12 DIAGNOSIS — I71.40 ABDOMINAL AORTIC ANEURYSM (AAA) WITHOUT RUPTURE, UNSPECIFIED PART (H): Primary | ICD-10-CM

## 2023-07-12 DIAGNOSIS — Z98.890 S/P AAA REPAIR: ICD-10-CM

## 2023-07-12 DIAGNOSIS — I25.10 ATHEROSCLEROSIS OF NATIVE CORONARY ARTERY OF NATIVE HEART WITHOUT ANGINA PECTORIS: ICD-10-CM

## 2023-07-12 DIAGNOSIS — I10 ESSENTIAL HYPERTENSION, BENIGN: ICD-10-CM

## 2023-07-12 DIAGNOSIS — R53.81 PHYSICAL DECONDITIONING: ICD-10-CM

## 2023-07-12 DIAGNOSIS — D62 ANEMIA DUE TO BLOOD LOSS, ACUTE: ICD-10-CM

## 2023-07-12 DIAGNOSIS — E44.0 MODERATE MALNUTRITION (H): ICD-10-CM

## 2023-07-12 DIAGNOSIS — R63.4 WEIGHT LOSS: ICD-10-CM

## 2023-07-12 DIAGNOSIS — J44.9 CHRONIC OBSTRUCTIVE PULMONARY DISEASE, UNSPECIFIED COPD TYPE (H): ICD-10-CM

## 2023-07-12 DIAGNOSIS — K59.03 DRUG-INDUCED CONSTIPATION: ICD-10-CM

## 2023-07-12 PROCEDURE — 99310 SBSQ NF CARE HIGH MDM 45: CPT | Performed by: NURSE PRACTITIONER

## 2023-07-12 NOTE — PROGRESS NOTES
"[facility] GERIATRICS    Chief Complaint   Patient presents with     RECHECK     HPI:  Roberto Carlos Still is a 78 year old  (1945), who is being seen today for an episodic care visit at: [facility]) [25]. Today's concern is:   AAA s/p repair: on exam today patient is resting in bed, denies pain or discomfort, patient is not in therapy, on a walking program with nursing but is walking very little, wife continues to look for placement for patient  CAD/HTN: /63, 134/72, 132/68  with HR 80-90 range, denies CP, palpitations, SOB weight on admission was 131lbs and current weight is 112 lbs weight starting to slowly trend up  Failure to thrive and weight loss: ongoing weight loss, patient states he is eating the food that tastes good to him, wife is bringing in foods, he is drinking supplements weight slowly trending up as above  Constipation: denies constipation, states bowels are working  Anemia: Hgb 8.9 on 6/26/23 trending up slowly    Allergies, and PMH/PSH reviewed in [facility] today.  REVIEW OF SYSTEMS:  10 point ROS of systems including Constitutional, Eyes, Respiratory, Cardiovascular, Gastroenterology, Genitourinary, Integumentary, Musculoskeletal, Psychiatric were all negative except for pertinent positives noted in my HPI.    Objective:   /72   Pulse 98   Temp 98.1  F (36.7  C)   Resp 18   Ht 1.778 m (5' 10\")   Wt 50.8 kg (112 lb)   SpO2 97%   BMI 16.07 kg/m    GENERAL APPEARANCE:  Alert, in no distress, thin  ENT:  Mouth and posterior oropharynx normal, moist mucous membranes, normal hearing acuity  EYES:  EOM, conjunctivae, lids, pupils and irises normal, PERRL  RESP:  respiratory effort and palpation of chest normal, lungs clear to auscultation , no respiratory distress, diminished breath sounds bases bilaterally  CV:  Palpation and auscultation of heart done , regular rate and rhythm, no murmur, rub, or gallop, no edema  ABDOMEN:  normal bowel sounds, soft, nontender, no " hepatosplenomegaly or other masses  M/S:   patient resting in bed  SKIN:  Inspection of skin and subcutaneous tissue baseline, did not visualize surgical incision  NEURO:   speech wnl  PSYCH:  affect and mood normal    Recent labs in [facility] reviewed by me today.  and   Most Recent 3 CBC's:No lab results found.  Most Recent 3 BMP's:Recent Labs   Lab Test 02/07/23  0000 02/02/22  0000 02/17/21  0000   .6 138.8 141.7   POTASSIUM 4.74 4.79 4.72   CHLORIDE 96.3* 98.4 99.8   CO2 35.0* 33.3* 32.8*   BUN 12  14.6 10  12.2 10  12.5   CR 0.82 0.82 0.80   MARGE 9.4 9.7 9.8   * 113* 127*       Assessment/Plan:  I71.40) Abdominal aortic aneurysm (AAA) without rupture, unspecified part (H)  (primary encounter diagnosis)  (Z98.890,  Z86.79) S/P AAA repair  (R53.81) Physical deconditioning  Comment: acute/ongoing, no change  Plan: PT and OT, tylenol 650mg q 4 hours prn  Vascular surgery appt 7/11/23 no new orders f/u in one month     (R63.4) weight loss  (E44.0) moderate malnutrition  Acute/ongoing patient weight slowly trending up, currently 112lbs   Weight loss of ~20lbs in 4 weeks  Plan: dietician to follow, continue dietary supplements,  Hospice consult ordered 6/28/23      (D62) Anemia due to blood loss, acute  Comment: acute/ongoing, no change  Hgb 8.4 at discharge  Plan: Hgb 8.6 on 6/28/23, follow Hgb     (I25.10) Atherosclerosis of native coronary artery of native heart without angina pectoris  (I10) Essential hypertension, benign  Comment: acute/ongoing, no change  Plan: Bmp follow, ASA 81mg QD, no further Lasix and Bisoprolol         (J44.9) Chronic obstructive pulmonary disease, unspecified COPD type (H)  Comment: ongoing, no change  Plan: monitor SaO2 at rest and with activity, continue incruse ellipta inhaler 62.5mcg/act 1 puff daily, symbicort 160/4.5mcg 2 puffs BID, combivent 1 puff QID prn  F/u with pulmonology as directed     (K59.03) Drug-induced constipation  Comment: acute/ongoing, no  change  Plan: continue senokot 8.6mg QD, miralax 17g QD    MED REC REQUIRED  Post Medication Reconciliation Status: medication reconcilation previously completed during another office visit      Orders:  No new orders      Electronically signed by: Tonya Lynn Haase, APRN CNP

## 2023-07-12 NOTE — LETTER
"    7/12/2023        RE: Roberto Carlos Still  [address] [address]        [facility]    Chief Complaint   Patient presents with     RECHECK     HPI:  Roberto Carlos Still is a 78 year old  (1945), who is being seen today for an episodic care visit at: [facility]) [25]. Today's concern is:   AAA s/p repair: on exam today patient is resting in bed, denies pain or discomfort, patient is not in therapy, on a walking program with nursing but is walking very little, wife continues to look for placement for patient  CAD/HTN: /63, 134/72, 132/68  with HR 80-90 range, denies CP, palpitations, SOB weight on admission was 131lbs and current weight is 112 lbs weight starting to slowly trend up  Failure to thrive and weight loss: ongoing weight loss, patient states he is eating the food that tastes good to him, wife is bringing in foods, he is drinking supplements weight slowly trending up as above  Constipation: denies constipation, states bowels are working  Anemia: Hgb 8.9 on 6/26/23 trending up slowly    Allergies, and PMH/PSH reviewed in [facility] today.  REVIEW OF SYSTEMS:  10 point ROS of systems including Constitutional, Eyes, Respiratory, Cardiovascular, Gastroenterology, Genitourinary, Integumentary, Musculoskeletal, Psychiatric were all negative except for pertinent positives noted in my HPI.    Objective:   /72   Pulse 98   Temp 98.1  F (36.7  C)   Resp 18   Ht 1.778 m (5' 10\")   Wt 50.8 kg (112 lb)   SpO2 97%   BMI 16.07 kg/m    GENERAL APPEARANCE:  Alert, in no distress, thin  ENT:  Mouth and posterior oropharynx normal, moist mucous membranes, normal hearing acuity  EYES:  EOM, conjunctivae, lids, pupils and irises normal, PERRL  RESP:  respiratory effort and palpation of chest normal, lungs clear to auscultation , no respiratory distress, diminished breath sounds bases bilaterally  CV:  Palpation and auscultation of heart done , regular rate and " rhythm, no murmur, rub, or gallop, no edema  ABDOMEN:  normal bowel sounds, soft, nontender, no hepatosplenomegaly or other masses  M/S:   patient resting in bed  SKIN:  Inspection of skin and subcutaneous tissue baseline, did not visualize surgical incision  NEURO:   speech wnl  PSYCH:  affect and mood normal    Recent labs in [facility] reviewed by me today.  and   Most Recent 3 CBC's:No lab results found.  Most Recent 3 BMP's:Recent Labs   Lab Test 02/07/23  0000 02/02/22  0000 02/17/21  0000   .6 138.8 141.7   POTASSIUM 4.74 4.79 4.72   CHLORIDE 96.3* 98.4 99.8   CO2 35.0* 33.3* 32.8*   BUN 12  14.6 10  12.2 10  12.5   CR 0.82 0.82 0.80   MARGE 9.4 9.7 9.8   * 113* 127*       Assessment/Plan:  I71.40) Abdominal aortic aneurysm (AAA) without rupture, unspecified part (H)  (primary encounter diagnosis)  (Z98.890,  Z86.79) S/P AAA repair  (R53.81) Physical deconditioning  Comment: acute/ongoing, no change  Plan: PT and OT, tylenol 650mg q 4 hours prn  Vascular surgery appt 7/11/23 no new orders f/u in one month     (R63.4) weight loss  (E44.0) moderate malnutrition  Acute/ongoing patient weight slowly trending up, currently 112lbs   Weight loss of ~20lbs in 4 weeks  Plan: dietician to follow, continue dietary supplements,  Hospice consult ordered 6/28/23      (D62) Anemia due to blood loss, acute  Comment: acute/ongoing, no change  Hgb 8.4 at discharge  Plan: Hgb 8.6 on 6/28/23, follow Hgb     (I25.10) Atherosclerosis of native coronary artery of native heart without angina pectoris  (I10) Essential hypertension, benign  Comment: acute/ongoing, no change  Plan: Bmp follow, ASA 81mg QD, no further Lasix and Bisoprolol         (J44.9) Chronic obstructive pulmonary disease, unspecified COPD type (H)  Comment: ongoing, no change  Plan: monitor SaO2 at rest and with activity, continue incruse ellipta inhaler 62.5mcg/act 1 puff daily, symbicort 160/4.5mcg 2 puffs BID, combivent 1 puff QID prn  F/u with  pulmonology as directed     (K59.03) Drug-induced constipation  Comment: acute/ongoing, no change  Plan: continue senokot 8.6mg QD, miralax 17g QD    MED REC REQUIRED  Post Medication Reconciliation Status: medication reconcilation previously completed during another office visit      Orders:  No new orders      Electronically signed by: Tonya Lynn Haase, APRN CNP             Sincerely,        Tonya Lynn Haase, APRN CNP

## 2023-07-18 ENCOUNTER — DISCHARGE SUMMARY NURSING HOME (OUTPATIENT)
Dept: GERIATRICS | Facility: CLINIC | Age: 78
End: 2023-07-18
Payer: COMMERCIAL

## 2023-07-18 VITALS
OXYGEN SATURATION: 97 % | HEART RATE: 80 BPM | SYSTOLIC BLOOD PRESSURE: 120 MMHG | HEIGHT: 70 IN | WEIGHT: 110 LBS | DIASTOLIC BLOOD PRESSURE: 66 MMHG | BODY MASS INDEX: 15.75 KG/M2 | RESPIRATION RATE: 16 BRPM | TEMPERATURE: 98.3 F

## 2023-07-18 DIAGNOSIS — J44.9 CHRONIC OBSTRUCTIVE PULMONARY DISEASE, UNSPECIFIED COPD TYPE (H): ICD-10-CM

## 2023-07-18 DIAGNOSIS — K59.03 DRUG-INDUCED CONSTIPATION: ICD-10-CM

## 2023-07-18 DIAGNOSIS — R63.4 WEIGHT LOSS: ICD-10-CM

## 2023-07-18 DIAGNOSIS — Z86.79 S/P AAA REPAIR: ICD-10-CM

## 2023-07-18 DIAGNOSIS — I10 ESSENTIAL HYPERTENSION, BENIGN: ICD-10-CM

## 2023-07-18 DIAGNOSIS — Z98.890 S/P AAA REPAIR: ICD-10-CM

## 2023-07-18 DIAGNOSIS — I71.40 ABDOMINAL AORTIC ANEURYSM (AAA) WITHOUT RUPTURE, UNSPECIFIED PART (H): Primary | ICD-10-CM

## 2023-07-18 DIAGNOSIS — E44.0 MODERATE MALNUTRITION (H): ICD-10-CM

## 2023-07-18 DIAGNOSIS — D62 ANEMIA DUE TO BLOOD LOSS, ACUTE: ICD-10-CM

## 2023-07-18 DIAGNOSIS — R53.81 PHYSICAL DECONDITIONING: ICD-10-CM

## 2023-07-18 DIAGNOSIS — I25.10 ATHEROSCLEROSIS OF NATIVE CORONARY ARTERY OF NATIVE HEART WITHOUT ANGINA PECTORIS: ICD-10-CM

## 2023-07-18 PROCEDURE — 99316 NF DSCHRG MGMT 30 MIN+: CPT | Performed by: NURSE PRACTITIONER

## 2023-07-18 NOTE — LETTER
7/18/2023        RE: Roberto Carlos Still  [address]  [place] [address]        [facility] GERIATRICS DISCHARGE SUMMARY  PATIENT'S NAME: Roberto Carlos Still  YOB: 1945  MEDICAL RECORD NUMBER:  [id number]  [facility]    PRIMARY CARE PROVIDER AND CLINIC RESPONSIBLE AFTER TRANSFER:   Leonard Palacios MD, [address], [facility] [address]    Assisted Living: [facility]     Transferring providers: Tonya Lynn Haase, JACKY CNP, Jefferson Hardin MD  Recent Hospitalization/ED:  [facility]  stay 5/15/23 to 5/25/23.  Date of SNF Admission: May 25, 2023  Date of SNF (anticipated) Discharge: July 19, 2023  Discharged to: new assisted living for patient [facility]  Cognitive Scores: BIMS: 12/15 and Short blessed: 12/28  Physical Function: Wheelchair dependent  DME: Wheelchair    CODE STATUS/ADVANCE DIRECTIVES DISCUSSION:  No Order   ALLERGIES: Cephalexin    NURSING FACILITY COURSE   Medication Changes/Rationale:     See assessment and plan    Summary of nursing facility stay:   Patient initially progressed to walking up to 100 feet using a RW but declined functionally throughout TCU stay, not eating well losing weight, needs assist with ADL's and toileting, [facility] [place]    Discharge Medications:  MED REC REQUIRED  Post Medication Reconciliation Status: medication reconcilation previously completed during another office visit       Current Outpatient Medications   Medication Sig Dispense Refill     acetaminophen (TYLENOL) 325 MG tablet Take 650 mg by mouth every 4 hours as needed for mild pain       aspirin (ASA) 81 MG EC tablet Take 1 tablet (81 mg) by mouth daily       budesonide-formoterol (SYMBICORT) 160-4.5 MCG/ACT Inhaler Inhale 2 puffs into the lungs 2 times daily 10.2 g 1     ipratropium-albuterol (COMBIVENT RESPIMAT)   "MCG/ACT inhaler Inhale 1 puff into the lungs 4 times daily as needed for shortness of breath       mirtazapine (REMERON) 7.5 MG tablet Take 2 tablets (15 mg) by mouth At Bedtime       polyethylene glycol (MIRALAX) 17 g packet Take 17 g by mouth daily       senna (SENOKOT) 8.6 MG tablet Take 1 tablet by mouth daily       simvastatin (ZOCOR) 20 MG tablet Take 1 tablet (20 mg) by mouth At Bedtime 90 tablet 3     umeclidinium (INCRUSE ELLIPTA) 62.5 MCG/ACT inhaler Inhale 1 puff into the lungs daily 30 each 3          Controlled medications:   not applicable/none     Past Medical History:   Past Medical History:   Diagnosis Date     Coronary atherosclerosis of unspecified type of vessel, native or graft      Other and unspecified hyperlipidemia      Personal history of tobacco use, presenting hazards to health      Physical Exam:   Vitals: /66   Pulse 80   Temp 98.3  F (36.8  C)   Resp 16   Ht 1.778 m (5' 10\")   Wt 49.9 kg (110 lb)   SpO2 97%   BMI 15.78 kg/m    BMI: Body mass index is 15.78 kg/m .  GENERAL APPEARANCE:  Alert, in no distress, thin  ENT:  Mouth and posterior oropharynx normal, moist mucous membranes, normal hearing acuity  EYES:  EOM, conjunctivae, lids, pupils and irises normal, PERRL  RESP:  respiratory effort and palpation of chest normal, lungs clear to auscultation , no respiratory distress, diminished breath sounds bases bilaterally  CV:  Palpation and auscultation of heart done , regular rate and rhythm, no murmur, rub, or gallop, no edema  ABDOMEN:  normal bowel sounds, soft, nontender, no hepatosplenomegaly or other masses  M/S:   patient sitting up in w/c  SKIN:  Inspection of skin and subcutaneous tissue baseline  NEURO:   speech wnl  PSYCH:  affect and mood normal     SNF labs: Recent labs in [facility] reviewed by me today.  and Most Recent 3 CBC's:No lab results found.  Most Recent 3 BMP's:Recent Labs   Lab Test 02/07/23  0000 02/02/22  0000 02/17/21  0000   .6 138.8 141.7 "   POTASSIUM 4.74 4.79 4.72   CHLORIDE 96.3* 98.4 99.8   CO2 35.0* 33.3* 32.8*   BUN 12  14.6 10  12.2 10  12.5   CR 0.82 0.82 0.80   MARGE 9.4 9.7 9.8   * 113* 127*       Assessment/Plan:  I71.40) Abdominal aortic aneurysm (AAA) without rupture, unspecified part (H)  (primary encounter diagnosis)  (Z98.890,  Z86.79) S/P AAA repair  (R53.81) Physical deconditioning  Comment: ongoing  Plan: PT and OT, tylenol 650mg q 4 hours prn  Vascular surgery appt 7/11/23     (R63.4) weight loss  (E44.0) moderate malnutrition  Acute/ongoing patient weight slowly trending up, currently 112lbs   Weight loss of ~20lbs in 4 weeks  Plan: dietician to follow, continue dietary supplements,  [facility]      (D62) Anemia due to blood loss, acute  Comment: ongoing  Hgb 8.4 at discharge  Plan: Hgb 8.6 on 6/28/23, no further labs, patient on hospice     (I25.10) Atherosclerosis of native coronary artery of native heart without angina pectoris  (I10) Essential hypertension, benign  Comment: ongoing  Plan: continue  ASA 81mg QD, no further Lasix and Bisoprolol         (J44.9) Chronic obstructive pulmonary disease, unspecified COPD type (H)  Comment: ongoing  Plan: continue incruse ellipta inhaler 62.5mcg/act 1 puff daily, symbicort 160/4.5mcg 2 puffs BID, combivent 1 puff QID prn, on continuous oxygen  F/u with pulmonology as directed     (K59.03) Drug-induced constipation  Comment: ongoing  Plan: continue senokot 8.6mg QD, miralax 17g QD    DISCHARGE PLAN:    Follow up labs: No labs orders/due    Medical Follow Up:      Follow up with primary care provider in 1-2 weeks    [facility] scheduled appointments:       Discharge Services: [facility]    Discharge Instructions Verbalized to Patient at Discharge:     None    TOTAL DISCHARGE TIME:   Greater than 30 minutes  Electronically signed by:  Tonya Lynn Haase, APRN [facility]     Home care Face to Face documentation done in [facility] attached to Home care orders  for [facility]. , Documentation of Face to Face and Certification for Home Health Services    I certify that patient: Roberto Carlos Still is under my care and that I, or a nurse practitioner or physician's assistant working with me, had a face-to-face encounter that meets the physician face-to-face encounter requirements with this patient on: 7/18/2023.    This encounter with the patient was in whole, or in part, for the following medical condition, which is the primary reason for home health care: weight loss, CAD  .    I certify that, based on my findings, the following services are medically necessary home health services: [facility].    My clinical findings support the need for the above services because: [facility] care, comfort care    Further, I certify that my clinical findings support that this patient is homebound (i.e. absences from home require considerable and taxing effort and are for medical reasons or [employer] services or infrequently or of short duration when for other reasons) because: Requires assistance of another person or specialized equipment to access medical services because patient: Requires supervision of another for safe transfer. and  w/c dependent..    Based on the above findings. I certify that this patient is confined to the home and needs intermittent skilled nursing care, physical therapy and/or speech therapy.  The patient is under my care, and I have initiated the establishment of the plan of care.  This patient will be followed by a physician who will periodically review the plan of care.  Physician/Provider to provide follow up care: Leonard Palacios    Attending hospital physician (the Medicare certified [facility] provider): Tonya Lynn Haase, APRN CNP  Physician Signature: See electronic signature associated with these discharge orders.  Date: 7/18/2023                  Sincerely,        Tonya Lynn Haase, APRN CNP

## 2024-03-16 ENCOUNTER — HEALTH MAINTENANCE LETTER (OUTPATIENT)
Age: 79
End: 2024-03-16

## 2024-07-30 ENCOUNTER — TRANSCRIBE ORDERS (OUTPATIENT)
Dept: OTHER | Age: 79
End: 2024-07-30

## 2024-07-30 DIAGNOSIS — I51.9 HEART DISEASE, UNSPECIFIED: Primary | ICD-10-CM

## 2024-08-02 ENCOUNTER — MEDICAL CORRESPONDENCE (OUTPATIENT)
Dept: HEALTH INFORMATION MANAGEMENT | Facility: CLINIC | Age: 79
End: 2024-08-02
Payer: COMMERCIAL

## 2024-08-19 ENCOUNTER — HOSPITAL ENCOUNTER (OUTPATIENT)
Dept: CARDIOLOGY | Facility: CLINIC | Age: 79
Discharge: HOME | End: 2024-08-19
Attending: PHYSICAL MEDICINE & REHABILITATION | Admitting: PHYSICAL MEDICINE & REHABILITATION
Payer: OTHER GOVERNMENT

## 2024-08-19 DIAGNOSIS — I51.9 HEART DISEASE, UNSPECIFIED: ICD-10-CM

## 2024-08-19 LAB — LVEF ECHO: NORMAL

## 2024-08-19 PROCEDURE — 93306 TTE W/DOPPLER COMPLETE: CPT

## 2024-08-19 PROCEDURE — 93306 TTE W/DOPPLER COMPLETE: CPT | Mod: 26 | Performed by: INTERNAL MEDICINE

## 2024-08-27 ENCOUNTER — LAB REQUISITION (OUTPATIENT)
Dept: LAB | Facility: CLINIC | Age: 79
End: 2024-08-27
Payer: COMMERCIAL

## 2024-08-27 DIAGNOSIS — J44.9 CHRONIC OBSTRUCTIVE PULMONARY DISEASE, UNSPECIFIED (H): ICD-10-CM

## 2024-09-06 ENCOUNTER — APPOINTMENT (OUTPATIENT)
Dept: URBAN - METROPOLITAN AREA CLINIC 255 | Age: 79
Setting detail: DERMATOLOGY
End: 2024-09-09

## 2024-09-06 DIAGNOSIS — D84.9 IMMUNODEFICIENCY, UNSPECIFIED: ICD-10-CM

## 2024-09-06 PROBLEM — C44.42 SQUAMOUS CELL CARCINOMA OF SKIN OF SCALP AND NECK: Status: ACTIVE | Noted: 2024-09-06

## 2024-09-06 PROCEDURE — 11626 EXC S/N/H/F/G MAL+MRG >4 CM: CPT

## 2024-09-06 PROCEDURE — OTHER COUNSELING: OTHER

## 2024-09-06 PROCEDURE — OTHER EXCISION: OTHER

## 2024-09-06 PROCEDURE — 12044 INTMD RPR N-HF/GENIT7.6-12.5: CPT

## 2024-09-06 PROCEDURE — OTHER MIPS QUALITY: OTHER

## 2024-09-06 NOTE — PROCEDURE: EXCISION
Body Location Override (Optional - Billing Will Still Be Based On Selected Body Map Location If Applicable): Left Trapezial Neck
Lab: [id number]
Surgeon (Optional): Dr. Vanna Wolff MD
Size Of Lesion In Cm: 4.2
X Size Of Lesion In Cm (Optional): 3.5
Size Of Margin In Cm: 0.4
Anesthesia Volume In Cc: 15
Was An Eye Clamp Used?: No
Eye Clamp Note Details: An eye clamp was used during the procedure.
Excision Method: Elliptical
Saucerization Depth: dermis and superficial adipose tissue
Repair Type: Intermediate
Intermediate / Complex Repair - Final Wound Length In Cm: 8
Suturegard Retention Suture: 2-0 Nylon
Retention Suture Bite Size: 3 mm
Length To Time In Minutes Device Was In Place: 10
Number Of Hemigard Strips Per Side: 1
Undermining Type: Entire Wound
Debridement Text: The wound edges were debrided prior to proceeding with the closure to facilitate wound healing.
Helical Rim Text: The closure involved the helical rim.
Vermilion Border Text: The closure involved the vermilion border.
Nostril Rim Text: The closure involved the nostril rim.
Retention Suture Text: Retention sutures were placed to support the closure and prevent dehiscence.
Primary Defect Length (In Cm): 0
Graft Donor Site Bandage (Optional-Leave Blank If You Don't Want In Note): Steri-strips and a pressure bandage were applied to the donor site.
Epidermal Closure Graft Donor Site (Optional): simple interrupted
Suture Removal: 14 days
Excision Depth: adipose tissue
Scalpel Size: 15 blade
Anesthesia Type: 1% lidocaine with epinephrine and a 1:10 solution of 8.4% sodium bicarbonate
Hemostasis: Electrocautery
Estimated Blood Loss (Cc): minimal
Repair Depth: use same depth as excision depth
Anesthesia Type: 1% lidocaine with epinephrine
Anesthesia Volume In Cc: 6
Deep Sutures: 4-0 PGCL
Epidermal Sutures: 4-0 Polypropylene
Epidermal Closure: running
Wound Care: Petrolatum
Dressing: pressure dressing with telfa
Suturegard Intro: Intraoperative tissue expansion was performed, utilizing the SUTUREGARD device, in order to reduce wound tension.
Suturegard Body: The suture ends were repeatedly re-tightened and re-clamped to achieve the desired tissue expansion.
Hemigard Intro: Due to skin fragility and wound tension, it was decided to use HEMIGARD adhesive retention suture devices to permit a linear closure. The skin was cleaned and dried for a 6cm distance away from the wound. Excessive hair, if present, was removed to allow for adhesion.
Hemigard Postcare Instructions: The HEMIGARD strips are to remain completely dry for at least 5-7 days.
Positioning (Leave Blank If You Do Not Want): The patient was placed in a comfortable position exposing the surgical site.
Pre-Excision Curettage Text (Leave Blank If You Do Not Want): Prior to drawing the surgical margin the visible lesion was removed with curettage to clearly define the lesion size.
Complex Repair Preamble Text (Leave Blank If You Do Not Want): Extensive wide undermining was performed.
Intermediate Repair Preamble Text (Leave Blank If You Do Not Want): Undermining was performed with blunt dissection.
Curvilinear Excision Additional Text (Leave Blank If You Do Not Want): The margin was drawn around the clinically apparent lesion.  A curvilinear shape was then drawn on the skin incorporating the lesion and margins.  Incisions were then made along these lines to the appropriate tissue plane and the lesion was extirpated.
Fusiform Excision Additional Text (Leave Blank If You Do Not Want): The margin was drawn around the clinically apparent lesion.  A fusiform shape was then drawn on the skin incorporating the lesion and margins.  Incisions were then made along these lines to the appropriate tissue plane and the lesion was extirpated.
Elliptical Excision Additional Text (Leave Blank If You Do Not Want): The margin was drawn around the clinically apparent lesion.  An elliptical shape was then drawn on the skin incorporating the lesion and margins.  Incisions were then made along these lines to the appropriate tissue plane and the lesion was extirpated.
Saucerization Excision Additional Text (Leave Blank If You Do Not Want): The margin was drawn around the clinically apparent lesion.  Incisions were then made along these lines, in a tangential fashion, to the appropriate tissue plane and the lesion was extirpated.
Slit Excision Additional Text (Leave Blank If You Do Not Want): A linear line was drawn on the skin overlying the lesion. An incision was made slowly until the lesion was visualized.  Once visualized, the lesion was removed with blunt dissection.
Excisional Biopsy Additional Text (Leave Blank If You Do Not Want): The margin was drawn around the clinically apparent lesion. An elliptical shape was then drawn on the skin incorporating the lesion and margins.  Incisions were then made along these lines to the appropriate tissue plane and the lesion was extirpated.
Perilesional Excision Additional Text (Leave Blank If You Do Not Want): The margin was drawn around the clinically apparent lesion. Incisions were then made along these lines to the appropriate tissue plane and the lesion was extirpated.
Repair Performed By Another Provider Text (Leave Blank If You Do Not Want): After the tissue was excised the defect was repaired by another provider.
No Repair - Repaired With Adjacent Surgical Defect Text (Leave Blank If You Do Not Want): After the excision the defect was repaired concurrently with another surgical defect which was in close approximation.
Adjacent Tissue Transfer Text: The defect edges were debeveled with a #15 scalpel blade. Given the location of the defect and the proximity to free margins an adjacent tissue transfer was deemed most appropriate. Using a sterile surgical marker, an appropriate flap was drawn incorporating the defect and placing the expected incisions within the relaxed skin tension lines where possible. The area thus outlined was incised deep to adipose tissue with a #15 scalpel blade. The skin margins were undermined to an appropriate distance in all directions utilizing iris scissors and carried over to close the primary defect.
Advancement Flap (Single) Text: The defect edges were debeveled with a #15 scalpel blade. Given the location of the defect and the proximity to free margins a single advancement flap was deemed most appropriate. Using a sterile surgical marker, an appropriate advancement flap was drawn incorporating the defect and placing the expected incisions within the relaxed skin tension lines where possible. The area thus outlined was incised deep to adipose tissue with a #15 scalpel blade. The skin margins were undermined to an appropriate distance in all directions utilizing iris scissors. Following this, the designed flap was advanced and carried over into the primary defect and sutured into place.
Advancement Flap (Double) Text: The defect edges were debeveled with a #15 scalpel blade. Given the location of the defect and the proximity to free margins a double advancement flap was deemed most appropriate. Using a sterile surgical marker, the appropriate advancement flaps were drawn incorporating the defect and placing the expected incisions within the relaxed skin tension lines where possible. The area thus outlined was incised deep to adipose tissue with a #15 scalpel blade. The skin margins were undermined to an appropriate distance in all directions utilizing iris scissors. Following this, the designed flaps were advanced and carried over into the primary defect and sutured into place.
Burow's Advancement Flap Text: The defect edges were debeveled with a #15 scalpel blade. Given the location of the defect and the proximity to free margins a Burow's advancement flap was deemed most appropriate. Using a sterile surgical marker, the appropriate advancement flap was drawn incorporating the defect and placing the expected incisions within the relaxed skin tension lines where possible. The area thus outlined was incised deep to adipose tissue with a #15 scalpel blade. The skin margins were undermined to an appropriate distance in all directions utilizing iris scissors. Following this, the designed flap was advanced and carried over into the primary defect and sutured into place.
Chonodrocutaneous Helical Advancement Flap Text: The defect edges were debeveled with a #15 scalpel blade. Given the location of the defect and the proximity to free margins a chondrocutaneous helical advancement flap was deemed most appropriate. Using a sterile surgical marker, the appropriate advancement flap was drawn incorporating the defect and placing the expected incisions within the relaxed skin tension lines where possible. The area thus outlined was incised deep to adipose tissue with a #15 scalpel blade. The skin margins were undermined to an appropriate distance in all directions utilizing iris scissors. Following this, the designed flap was advanced and carried over into the primary defect and sutured into place.
Crescentic Advancement Flap Text: The defect edges were debeveled with a #15 scalpel blade. Given the location of the defect and the proximity to free margins a crescentic advancement flap was deemed most appropriate. Using a sterile surgical marker, the appropriate advancement flap was drawn incorporating the defect and placing the expected incisions within the relaxed skin tension lines where possible. The area thus outlined was incised deep to adipose tissue with a #15 scalpel blade. The skin margins were undermined to an appropriate distance in all directions utilizing iris scissors. Following this, the designed flap was advanced and carried over into the primary defect and sutured into place.
A-T Advancement Flap Text: The defect edges were debeveled with a #15 scalpel blade. Given the location of the defect, shape of the defect and the proximity to free margins an A-T advancement flap was deemed most appropriate. Using a sterile surgical marker, an appropriate advancement flap was drawn incorporating the defect and placing the expected incisions within the relaxed skin tension lines where possible. The area thus outlined was incised deep to adipose tissue with a #15 scalpel blade. The skin margins were undermined to an appropriate distance in all directions utilizing iris scissors. Following this, the designed flap was advanced and carried over into the primary defect and sutured into place.
O-T Advancement Flap Text: The defect edges were debeveled with a #15 scalpel blade. Given the location of the defect, shape of the defect and the proximity to free margins an O-T advancement flap was deemed most appropriate. Using a sterile surgical marker, an appropriate advancement flap was drawn incorporating the defect and placing the expected incisions within the relaxed skin tension lines where possible. The area thus outlined was incised deep to adipose tissue with a #15 scalpel blade. The skin margins were undermined to an appropriate distance in all directions utilizing iris scissors. Following this, the designed flap was advanced and carried over into the primary defect and sutured into place.
O-L Flap Text: The defect edges were debeveled with a #15 scalpel blade. Given the location of the defect, shape of the defect and the proximity to free margins an O-L flap was deemed most appropriate. Using a sterile surgical marker, an appropriate advancement flap was drawn incorporating the defect and placing the expected incisions within the relaxed skin tension lines where possible. The area thus outlined was incised deep to adipose tissue with a #15 scalpel blade. The skin margins were undermined to an appropriate distance in all directions utilizing iris scissors. Following this, the designed flap was advanced and carried over into the primary defect and sutured into place.
O-Z Flap Text: The defect edges were debeveled with a #15 scalpel blade. Given the location of the defect, shape of the defect and the proximity to free margins an O-Z flap was deemed most appropriate. Using a sterile surgical marker, an appropriate transposition flap was drawn incorporating the defect and placing the expected incisions within the relaxed skin tension lines where possible. The area thus outlined was incised deep to adipose tissue with a #15 scalpel blade. The skin margins were undermined to an appropriate distance in all directions utilizing iris scissors. Following this, the designed flap was carried over into the primary defect and sutured into place.
Double O-Z Flap Text: The defect edges were debeveled with a #15 scalpel blade. Given the location of the defect, shape of the defect and the proximity to free margins a Double O-Z flap was deemed most appropriate. Using a sterile surgical marker, an appropriate transposition flap was drawn incorporating the defect and placing the expected incisions within the relaxed skin tension lines where possible. The area thus outlined was incised deep to adipose tissue with a #15 scalpel blade. The skin margins were undermined to an appropriate distance in all directions utilizing iris scissors. Following this, the designed flap was carried over into the primary defect and sutured into place.
V-Y Flap Text: The defect edges were debeveled with a #15 scalpel blade. Given the location of the defect, shape of the defect and the proximity to free margins a V-Y flap was deemed most appropriate. Using a sterile surgical marker, an appropriate advancement flap was drawn incorporating the defect and placing the expected incisions within the relaxed skin tension lines where possible. The area thus outlined was incised deep to adipose tissue with a #15 scalpel blade. The skin margins were undermined to an appropriate distance in all directions utilizing iris scissors. Following this, the designed flap was advanced and carried over into the primary defect and sutured into place.
Advancement-Rotation Flap Text: The defect edges were debeveled with a #15 scalpel blade. Given the location of the defect, shape of the defect and the proximity to free margins an advancement-rotation flap was deemed most appropriate. Using a sterile surgical marker, an appropriate flap was drawn incorporating the defect and placing the expected incisions within the relaxed skin tension lines where possible. The area thus outlined was incised deep to adipose tissue with a #15 scalpel blade. The skin margins were undermined to an appropriate distance in all directions utilizing iris scissors. Following this, the designed flap was carried over into the primary defect and sutured into place.
Mercedes Flap Text: The defect edges were debeveled with a #15 scalpel blade. Given the location of the defect, shape of the defect and the proximity to free margins a Mercedes flap was deemed most appropriate. Using a sterile surgical marker, an appropriate advancement flap was drawn incorporating the defect and placing the expected incisions within the relaxed skin tension lines where possible. The area thus outlined was incised deep to adipose tissue with a #15 scalpel blade. The skin margins were undermined to an appropriate distance in all directions utilizing iris scissors. Following this, the designed flap was advanced and carried over into the primary defect and sutured into place.
Modified Advancement Flap Text: The defect edges were debeveled with a #15 scalpel blade. Given the location of the defect, shape of the defect and the proximity to free margins a modified advancement flap was deemed most appropriate. Using a sterile surgical marker, an appropriate advancement flap was drawn incorporating the defect and placing the expected incisions within the relaxed skin tension lines where possible. The area thus outlined was incised deep to adipose tissue with a #15 scalpel blade. The skin margins were undermined to an appropriate distance in all directions utilizing iris scissors. Following this, the designed flap was advanced and carried over into the primary defect and sutured into place.
Mucosal Advancement Flap Text: Given the location of the defect, shape of the defect and the proximity to free margins a mucosal advancement flap was deemed most appropriate. Incisions were made with a 15 blade scalpel in the appropriate fashion along the cutaneous vermilion border and the mucosal lip. The remaining actinically damaged mucosal tissue was excised.  The mucosal advancement flap was then elevated to the gingival sulcus with care taken to preserve the neurovascular structures and advanced into the primary defect. Care was taken to ensure that precise realignment of the vermilion border was achieved.
Peng Advancement Flap Text: The defect edges were debeveled with a #15 scalpel blade. Given the location of the defect, shape of the defect and the proximity to free margins a Peng advancement flap was deemed most appropriate. Using a sterile surgical marker, an appropriate advancement flap was drawn incorporating the defect and placing the expected incisions within the relaxed skin tension lines where possible. The area thus outlined was incised deep to adipose tissue with a #15 scalpel blade. The skin margins were undermined to an appropriate distance in all directions utilizing iris scissors. Following this, the designed flap was advanced and carried over into the primary defect and sutured into place.
Hatchet Flap Text: The defect edges were debeveled with a #15 scalpel blade. Given the location of the defect, shape of the defect and the proximity to free margins a hatchet flap was deemed most appropriate. Using a sterile surgical marker, an appropriate hatchet flap was drawn incorporating the defect and placing the expected incisions within the relaxed skin tension lines where possible. The area thus outlined was incised deep to adipose tissue with a #15 scalpel blade. The skin margins were undermined to an appropriate distance in all directions utilizing iris scissors. Following this, the designed flap was carried over into the primary defect and sutured into place.
Rotation Flap Text: The defect edges were debeveled with a #15 scalpel blade. Given the location of the defect, shape of the defect and the proximity to free margins a rotation flap was deemed most appropriate. Using a sterile surgical marker, an appropriate rotation flap was drawn incorporating the defect and placing the expected incisions within the relaxed skin tension lines where possible. The area thus outlined was incised deep to adipose tissue with a #15 scalpel blade. The skin margins were undermined to an appropriate distance in all directions utilizing iris scissors. Following this, the designed flap was carried over into the primary defect and sutured into place.
Bilateral Rotation Flap Text: The defect edges were debeveled with a #15 scalpel blade. Given the location of the defect, shape of the defect and the proximity to free margins a bilateral rotation flap was deemed most appropriate. Using a sterile surgical marker, an appropriate rotation flap was drawn incorporating the defect and placing the expected incisions within the relaxed skin tension lines where possible. The area thus outlined was incised deep to adipose tissue with a #15 scalpel blade. The skin margins were undermined to an appropriate distance in all directions utilizing iris scissors. Following this, the designed flap was carried over into the primary defect and sutured into place.
Spiral Flap Text: The defect edges were debeveled with a #15 scalpel blade. Given the location of the defect, shape of the defect and the proximity to free margins a spiral flap was deemed most appropriate. Using a sterile surgical marker, an appropriate rotation flap was drawn incorporating the defect and placing the expected incisions within the relaxed skin tension lines where possible. The area thus outlined was incised deep to adipose tissue with a #15 scalpel blade. The skin margins were undermined to an appropriate distance in all directions utilizing iris scissors. Following this, the designed flap was carried over into the primary defect and sutured into place.
Staged Advancement Flap Text: The defect edges were debeveled with a #15 scalpel blade. Given the location of the defect, shape of the defect and the proximity to free margins a staged advancement flap was deemed most appropriate. Using a sterile surgical marker, an appropriate advancement flap was drawn incorporating the defect and placing the expected incisions within the relaxed skin tension lines where possible. The area thus outlined was incised deep to adipose tissue with a #15 scalpel blade. The skin margins were undermined to an appropriate distance in all directions utilizing iris scissors. Following this, the designed flap was carried over into the primary defect and sutured into place.
Star Wedge Flap Text: The defect edges were debeveled with a #15 scalpel blade. Given the location of the defect, shape of the defect and the proximity to free margins a star wedge flap was deemed most appropriate. Using a sterile surgical marker, an appropriate rotation flap was drawn incorporating the defect and placing the expected incisions within the relaxed skin tension lines where possible. The area thus outlined was incised deep to adipose tissue with a #15 scalpel blade. The skin margins were undermined to an appropriate distance in all directions utilizing iris scissors. Following this, the designed flap was carried over into the primary defect and sutured into place.
Transposition Flap Text: The defect edges were debeveled with a #15 scalpel blade. Given the location of the defect and the proximity to free margins a transposition flap was deemed most appropriate. Using a sterile surgical marker, an appropriate transposition flap was drawn incorporating the defect. The area thus outlined was incised deep to adipose tissue with a #15 scalpel blade. The skin margins were undermined to an appropriate distance in all directions utilizing iris scissors. Following this, the designed flap was carried over into the primary defect and sutured into place.
Muscle Hinge Flap Text: The defect edges were debeveled with a #15 scalpel blade.  Given the size, depth and location of the defect and the proximity to free margins a muscle hinge flap was deemed most appropriate. Using a sterile surgical marker, an appropriate hinge flap was drawn incorporating the defect. The area thus outlined was incised with a #15 scalpel blade. The skin margins were undermined to an appropriate distance in all directions utilizing iris scissors. Following this, the designed flap was carried into the primary defect and sutured into place.
Mustarde Flap Text: The defect edges were debeveled with a #15 scalpel blade.  Given the size, depth and location of the defect and the proximity to free margins a Mustarde flap was deemed most appropriate. Using a sterile surgical marker, an appropriate flap was drawn incorporating the defect. The area thus outlined was incised with a #15 scalpel blade. The skin margins were undermined to an appropriate distance in all directions utilizing iris scissors. Following this, the designed flap was carried into the primary defect and sutured into place.
Nasal Turnover Hinge Flap Text: The defect edges were debeveled with a #15 scalpel blade.  Given the size, depth, location of the defect and the defect being full thickness a nasal turnover hinge flap was deemed most appropriate. Using a sterile surgical marker, an appropriate hinge flap was drawn incorporating the defect. The area thus outlined was incised with a #15 scalpel blade. The flap was designed to recreate the nasal mucosal lining and the alar rim. The skin margins were undermined to an appropriate distance in all directions utilizing iris scissors. Following this, the designed flap was carried over into the primary defect and sutured into place
Nasalis-Muscle-Based Myocutaneous Island Pedicle Flap Text: Using a #15 blade, an incision was made around the donor flap to the level of the nasalis muscle. Wide lateral undermining was then performed in both the subcutaneous plane above the nasalis muscle, and in a submuscular plane just above periosteum. This allowed the formation of a free nasalis muscle axial pedicle (based on the angular artery) which was still attached to the actual cutaneous flap, increasing its mobility and vascular viability. Hemostasis was obtained with pinpoint electrocoagulation. The flap was mobilized into position and the pivotal anchor points positioned and stabilized with buried interrupted sutures. Subcutaneous and dermal tissues were closed in a multilayered fashion with sutures. Tissue redundancies were excised, and the epidermal edges were apposed without significant tension and sutured with sutures.
Nasalis Myocutaneous Flap Text: Using a #15 blade, an incision was made around the donor flap to the level of the nasalis muscle. Wide lateral undermining was then performed in both the subcutaneous plane above the nasalis muscle, and in a submuscular plane just above periosteum. This allowed the formation of a free nasalis muscle axial pedicle which was still attached to the actual cutaneous flap, increasing its mobility and vascular viability. Hemostasis was obtained with pinpoint electrocoagulation. The flap was mobilized into position and the pivotal anchor points positioned and stabilized with buried interrupted sutures. Subcutaneous and dermal tissues were closed in a multilayered fashion with sutures. Tissue redundancies were excised, and the epidermal edges were apposed without significant tension and sutured with sutures.
Nasolabial Transposition Flap Text: The defect edges were debeveled with a #15 scalpel blade.  Given the size, depth and location of the defect and the proximity to free margins a nasolabial transposition flap was deemed most appropriate. Using a sterile surgical marker, an appropriate flap was drawn incorporating the defect. The area thus outlined was incised with a #15 scalpel blade. The skin margins were undermined to an appropriate distance in all directions utilizing iris scissors. Following this, the designed flap was carried into the primary defect and sutured into place.
Orbicularis Oris Muscle Flap Text: The defect edges were debeveled with a #15 scalpel blade.  Given that the defect affected the competency of the oral sphincter an orbicularis oris muscle flap was deemed most appropriate to restore this competency and normal muscle function.  Using a sterile surgical marker, an appropriate flap was drawn incorporating the defect. The area thus outlined was incised with a #15 scalpel blade. Following this, the designed flap was carried over into the primary defect and sutured into place.
Melolabial Transposition Flap Text: The defect edges were debeveled with a #15 scalpel blade. Given the location of the defect and the proximity to free margins a melolabial flap was deemed most appropriate. Using a sterile surgical marker, an appropriate melolabial transposition flap was drawn incorporating the defect. The area thus outlined was incised deep to adipose tissue with a #15 scalpel blade. The skin margins were undermined to an appropriate distance in all directions utilizing iris scissors. Following this, the designed flap was carried over into the primary defect and sutured into place.
Rectangular Flap Text: The defect edges were debeveled with a #15 scalpel blade. Given the location of the defect and the proximity to free margins a rectangular flap was deemed most appropriate. Using a sterile surgical marker, an appropriate rectangular flap was drawn incorporating the defect. The area thus outlined was incised deep to adipose tissue with a #15 scalpel blade. The skin margins were undermined to an appropriate distance in all directions utilizing iris scissors. Following this, the designed flap was carried over into the primary defect and sutured into place.
Rhombic Flap Text: The defect edges were debeveled with a #15 scalpel blade. Given the location of the defect and the proximity to free margins a rhombic flap was deemed most appropriate. Using a sterile surgical marker, an appropriate rhombic flap was drawn incorporating the defect. The area thus outlined was incised deep to adipose tissue with a #15 scalpel blade. The skin margins were undermined to an appropriate distance in all directions utilizing iris scissors. Following this, the designed flap was carried over into the primary defect and sutured into place.
Rhomboid Transposition Flap Text: The defect edges were debeveled with a #15 scalpel blade. Given the location of the defect and the proximity to free margins a rhomboid transposition flap was deemed most appropriate. Using a sterile surgical marker, an appropriate rhomboid flap was drawn incorporating the defect. The area thus outlined was incised deep to adipose tissue with a #15 scalpel blade. The skin margins were undermined to an appropriate distance in all directions utilizing iris scissors. Following this, the designed flap was carried over into the primary defect and sutured into place.
Bi-Rhombic Flap Text: The defect edges were debeveled with a #15 scalpel blade. Given the location of the defect and the proximity to free margins a bi-rhombic flap was deemed most appropriate. Using a sterile surgical marker, an appropriate rhombic flap was drawn incorporating the defect. The area thus outlined was incised deep to adipose tissue with a #15 scalpel blade. The skin margins were undermined to an appropriate distance in all directions utilizing iris scissors. Following this, the designed flap was carried over into the primary defect and sutured into place.
Helical Rim Advancement Flap Text: The defect edges were debeveled with a #15 blade scalpel.  Given the location of the defect and the proximity to free margins (helical rim) a double helical rim advancement flap was deemed most appropriate. Using a sterile surgical marker, the appropriate advancement flaps were drawn incorporating the defect and placing the expected incisions between the helical rim and antihelix where possible.  The area thus outlined was incised through and through with a #15 scalpel blade.  With a skin hook and iris scissors, the flaps were gently and sharply undermined and freed up. Folllowing this, the designed flaps were carried over into the primary defect and sutured into place.
Bilateral Helical Rim Advancement Flap Text: The defect edges were debeveled with a #15 blade scalpel.  Given the location of the defect and the proximity to free margins (helical rim) a bilateral helical rim advancement flap was deemed most appropriate. Using a sterile surgical marker, the appropriate advancement flaps were drawn incorporating the defect and placing the expected incisions between the helical rim and antihelix where possible.  The area thus outlined was incised through and through with a #15 scalpel blade.  With a skin hook and iris scissors, the flaps were gently and sharply undermined and freed up. Following this, the designed flaps were placed into the primary defect and sutured into place.
Ear Star Wedge Flap Text: The defect edges were debeveled with a #15 blade scalpel.  Given the location of the defect and the proximity to free margins (helical rim) an ear star wedge flap was deemed most appropriate. Using a sterile surgical marker, the appropriate flap was drawn incorporating the defect and placing the expected incisions between the helical rim and antihelix where possible.  The area thus outlined was incised through and through with a #15 scalpel blade. Following this, the designed flap was carried over into the primary defect and sutured into place.
Flip-Flop Flap Text: The defect edges were debeveled with a #15 blade scalpel.  Given the location of the defect and the proximity to free margins a flip-flop flap was deemed most appropriate. Using a sterile surgical marker, the appropriate flap was drawn incorporating the defect and placing the expected incisions between the helical rim and antihelix where possible.  The area thus outlined was incised through and through with a #15 scalpel blade. Following this, the designed flap was carried over into the primary defect and sutured into place.
Banner Transposition Flap Text: The defect edges were debeveled with a #15 scalpel blade. Given the location of the defect and the proximity to free margins a Banner transposition flap was deemed most appropriate. Using a sterile surgical marker, an appropriate flap was drawn around the defect. The area thus outlined was incised deep to adipose tissue with a #15 scalpel blade. The skin margins were undermined to an appropriate distance in all directions utilizing iris scissors. Following this, the designed flap was carried into the primary defect and sutured into place.
Bilobed Flap Text: The defect edges were debeveled with a #15 scalpel blade. Given the location of the defect and the proximity to free margins a bilobe flap was deemed most appropriate. Using a sterile surgical marker, an appropriate bilobe flap drawn around the defect. The area thus outlined was incised deep to adipose tissue with a #15 scalpel blade. The skin margins were undermined to an appropriate distance in all directions utilizing iris scissors. Following this, the designed flap was carried over into the primary defect and sutured into place.
Bilobed Transposition Flap Text: The defect edges were debeveled with a #15 scalpel blade. Given the location of the defect and the proximity to free margins a bilobed transposition flap was deemed most appropriate. Using a sterile surgical marker, an appropriate bilobe flap drawn around the defect. The area thus outlined was incised deep to adipose tissue with a #15 scalpel blade. The skin margins were undermined to an appropriate distance in all directions utilizing iris scissors. Following this, the designed flap was carried over into the primary defect and sutured into place.
Trilobed Flap Text: The defect edges were debeveled with a #15 scalpel blade. Given the location of the defect and the proximity to free margins a trilobed flap was deemed most appropriate. Using a sterile surgical marker, an appropriate trilobed flap was drawn around the defect. The area thus outlined was incised deep to adipose tissue with a #15 scalpel blade. The skin margins were undermined to an appropriate distance in all directions utilizing iris scissors. Following this, the designed flap was carried into the primary defect and sutured into place.
Dorsal Nasal Flap Text: The defect edges were debeveled with a #15 scalpel blade. Given the location of the defect and the proximity to free margins a dorsal nasal flap was deemed most appropriate. Using a sterile surgical marker, an appropriate dorsal nasal flap was drawn around the defect. The area thus outlined was incised deep to adipose tissue with a #15 scalpel blade. The skin margins were undermined to an appropriate distance in all directions utilizing iris scissors. Following this, the designed flap was carried into the primary defect and sutured into place.
Island Pedicle Flap Text: The defect edges were debeveled with a #15 scalpel blade. Given the location of the defect, shape of the defect and the proximity to free margins an island pedicle advancement flap was deemed most appropriate. Using a sterile surgical marker, an appropriate advancement flap was drawn incorporating the defect, outlining the appropriate donor tissue and placing the expected incisions within the relaxed skin tension lines where possible. The area thus outlined was incised deep to adipose tissue with a #15 scalpel blade. The skin margins were undermined to an appropriate distance in all directions around the primary defect and laterally outward around the island pedicle utilizing iris scissors.  There was minimal undermining beneath the pedicle flap. Following this, the flap was carried over into the primary defect and sutured into place.
Island Pedicle Flap With Canthal Suspension Text: The defect edges were debeveled with a #15 scalpel blade. Given the location of the defect, shape of the defect and the proximity to free margins an island pedicle advancement flap was deemed most appropriate. Using a sterile surgical marker, an appropriate advancement flap was drawn incorporating the defect, outlining the appropriate donor tissue and placing the expected incisions within the relaxed skin tension lines where possible. The area thus outlined was incised deep to adipose tissue with a #15 scalpel blade. The skin margins were undermined to an appropriate distance in all directions around the primary defect and laterally outward around the island pedicle utilizing iris scissors.  There was minimal undermining beneath the pedicle flap. A suspension suture was placed in the canthal tendon to prevent tension and prevent ectropion. Following this, the designed flap was placed into the primary defect and sutured into place.
Alar Island Pedicle Flap Text: The defect edges were debeveled with a #15 scalpel blade. Given the location of the defect, shape of the defect and the proximity to the alar rim an island pedicle advancement flap was deemed most appropriate. Using a sterile surgical marker, an appropriate advancement flap was drawn incorporating the defect, outlining the appropriate donor tissue and placing the expected incisions within the nasal ala running parallel to the alar rim. The area thus outlined was incised with a #15 scalpel blade. The skin margins were undermined minimally to an appropriate distance in all directions around the primary defect and laterally outward around the island pedicle utilizing iris scissors.  There was minimal undermining beneath the pedicle flap. Following this, the designed flap was carried over into the primary defect and sutured into place.
Double Island Pedicle Flap Text: The defect edges were debeveled with a #15 scalpel blade. Given the location of the defect, shape of the defect and the proximity to free margins a double island pedicle advancement flap was deemed most appropriate. Using a sterile surgical marker, an appropriate advancement flap was drawn incorporating the defect, outlining the appropriate donor tissue and placing the expected incisions within the relaxed skin tension lines where possible. The area thus outlined was incised deep to adipose tissue with a #15 scalpel blade. The skin margins were undermined to an appropriate distance in all directions around the primary defect and laterally outward around the island pedicle utilizing iris scissors.  There was minimal undermining beneath the pedicle flap. Following this, the flap was carried over into the primary defect and sutured into place.
Island Pedicle Flap-Requiring Vessel Identification Text: The defect edges were debeveled with a #15 scalpel blade. Given the location of the defect, shape of the defect and the proximity to free margins an island pedicle advancement flap was deemed most appropriate. Using a sterile surgical marker, an appropriate advancement flap was drawn, based on the axial vessel mentioned above, incorporating the defect, outlining the appropriate donor tissue and placing the expected incisions within the relaxed skin tension lines where possible. The area thus outlined was incised deep to adipose tissue with a #15 scalpel blade. The skin margins were undermined to an appropriate distance in all directions around the primary defect and laterally outward around the island pedicle utilizing iris scissors.  There was minimal undermining beneath the pedicle flap. Following this, the designed flap was carried over into the primary defect and sutured into place.
Keystone Flap Text: The defect edges were debeveled with a #15 scalpel blade. Given the location of the defect, shape of the defect a keystone flap was deemed most appropriate. Using a sterile surgical marker, an appropriate keystone flap was drawn incorporating the defect, outlining the appropriate donor tissue and placing the expected incisions within the relaxed skin tension lines where possible. The area thus outlined was incised deep to adipose tissue with a #15 scalpel blade. The skin margins were undermined to an appropriate distance in all directions around the primary defect and laterally outward around the flap utilizing iris scissors. Following this, the designed flap was carried into the primary defect and sutured into place.
O-T Plasty Text: The defect edges were debeveled with a #15 scalpel blade. Given the location of the defect, shape of the defect and the proximity to free margins an O-T plasty was deemed most appropriate. Using a sterile surgical marker, an appropriate O-T plasty was drawn incorporating the defect and placing the expected incisions within the relaxed skin tension lines where possible. The area thus outlined was incised deep to adipose tissue with a #15 scalpel blade. The skin margins were undermined to an appropriate distance in all directions utilizing iris scissors. Following this, the designed flap was carried over into the primary defect and sutured into place.
O-Z Plasty Text: The defect edges were debeveled with a #15 scalpel blade. Given the location of the defect, shape of the defect and the proximity to free margins an O-Z plasty (double transposition flap) was deemed most appropriate. Using a sterile surgical marker, the appropriate transposition flaps were drawn incorporating the defect and placing the expected incisions within the relaxed skin tension lines where possible. The area thus outlined was incised deep to adipose tissue with a #15 scalpel blade. The skin margins were undermined to an appropriate distance in all directions utilizing iris scissors. Hemostasis was achieved with electrocautery. The flaps were then transposed and carried over into place, one clockwise and the other counterclockwise, and anchored with interrupted buried subcutaneous sutures.
Double O-Z Plasty Text: The defect edges were debeveled with a #15 scalpel blade. Given the location of the defect, shape of the defect and the proximity to free margins a Double O-Z plasty (double transposition flap) was deemed most appropriate. Using a sterile surgical marker, the appropriate transposition flaps were drawn incorporating the defect and placing the expected incisions within the relaxed skin tension lines where possible. The area thus outlined was incised deep to adipose tissue with a #15 scalpel blade. The skin margins were undermined to an appropriate distance in all directions utilizing iris scissors. Hemostasis was achieved with electrocautery. The flaps were then transposed and carried over into place, one clockwise and the other counterclockwise, and anchored with interrupted buried subcutaneous sutures.
V-Y Plasty Text: The defect edges were debeveled with a #15 scalpel blade. Given the location of the defect, shape of the defect and the proximity to free margins an V-Y advancement flap was deemed most appropriate. Using a sterile surgical marker, an appropriate advancement flap was drawn incorporating the defect and placing the expected incisions within the relaxed skin tension lines where possible. The area thus outlined was incised deep to adipose tissue with a #15 scalpel blade. The skin margins were undermined to an appropriate distance in all directions utilizing iris scissors. Following this, the designed flap was advanced and carried over into the primary defect and sutured into place.
H Plasty Text: Given the location of the defect, shape of the defect and the proximity to free margins a H-plasty was deemed most appropriate for repair. Using a sterile surgical marker, the appropriate advancement arms of the H-plasty were drawn incorporating the defect and placing the expected incisions within the relaxed skin tension lines where possible. The area thus outlined was incised deep to adipose tissue with a #15 scalpel blade. The skin margins were undermined to an appropriate distance in all directions utilizing iris scissors.  The opposing advancement arms were then advanced and carried over into place in opposite direction and anchored with interrupted buried subcutaneous sutures.
W Plasty Text: The lesion was extirpated to the level of the fat with a #15 scalpel blade. Given the location of the defect, shape of the defect and the proximity to free margins a W-plasty was deemed most appropriate for repair. Using a sterile surgical marker, the appropriate transposition arms of the W-plasty were drawn incorporating the defect and placing the expected incisions within the relaxed skin tension lines where possible. The area thus outlined was incised deep to adipose tissue with a #15 scalpel blade. The skin margins were undermined to an appropriate distance in all directions utilizing iris scissors. The opposing transposition arms were then transposed and carried over into place in opposite direction and anchored with interrupted buried subcutaneous sutures.
Z Plasty Text: The lesion was extirpated to the level of the fat with a #15 scalpel blade. Given the location of the defect, shape of the defect and the proximity to free margins a Z-plasty was deemed most appropriate for repair. Using a sterile surgical marker, the appropriate transposition arms of the Z-plasty were drawn incorporating the defect and placing the expected incisions within the relaxed skin tension lines where possible. The area thus outlined was incised deep to adipose tissue with a #15 scalpel blade. The skin margins were undermined to an appropriate distance in all directions utilizing iris scissors. The opposing transposition arms were then transposed and carried over into place in opposite direction and anchored with interrupted buried subcutaneous sutures.
Double Z Plasty Text: The lesion was extirpated to the level of the fat with a #15 scalpel blade. Given the location of the defect, shape of the defect and the proximity to free margins a double Z-plasty was deemed most appropriate for repair. Using a sterile surgical marker, the appropriate transposition arms of the double Z-plasty were drawn incorporating the defect and placing the expected incisions within the relaxed skin tension lines where possible. The area thus outlined was incised deep to adipose tissue with a #15 scalpel blade. The skin margins were undermined to an appropriate distance in all directions utilizing iris scissors. The opposing transposition arms were then transposed and carried over into place in opposite direction and anchored with interrupted buried subcutaneous sutures.
Zygomaticofacial Flap Text: Given the location of the defect, shape of the defect and the proximity to free margins a zygomaticofacial flap was deemed most appropriate for repair. Using a sterile surgical marker, the appropriate flap was drawn incorporating the defect and placing the expected incisions within the relaxed skin tension lines where possible. The area thus outlined was incised deep to adipose tissue with a #15 scalpel blade with preservation of a vascular pedicle.  The skin margins were undermined to an appropriate distance in all directions utilizing iris scissors. The flap was then carried over into the defect and anchored with interrupted buried subcutaneous sutures.
Cheek Interpolation Flap Text: A decision was made to reconstruct the defect utilizing an interpolation axial flap and a staged reconstruction.  A telfa template was made of the defect.  This telfa template was then used to outline the Cheek Interpolation flap.  The donor area for the pedicle flap was then injected with anesthesia.  The flap was excised through the skin and subcutaneous tissue down to the layer of the underlying musculature.  The interpolation flap was carefully excised within this deep plane to maintain its blood supply.  The edges of the donor site were undermined.   The donor site was closed in a primary fashion.  The pedicle was then rotated into position and sutured.  Once the tube was sutured into place, adequate blood supply was confirmed with blanching and refill.  The pedicle was then wrapped with xeroform gauze and dressed appropriately with a telfa and gauze bandage to ensure continued blood supply and protect the attached pedicle.
Cheek-To-Nose Interpolation Flap Text: A decision was made to reconstruct the defect utilizing an interpolation axial flap and a staged reconstruction.  A telfa template was made of the defect.  This telfa template was then used to outline the Cheek-To-Nose Interpolation flap.  The donor area for the pedicle flap was then injected with anesthesia.  The flap was excised through the skin and subcutaneous tissue down to the layer of the underlying musculature.  The interpolation flap was carefully excised within this deep plane to maintain its blood supply.  The edges of the donor site were undermined.   The donor site was closed in a primary fashion.  The pedicle was then rotated into position and sutured.  Once the tube was sutured into place, adequate blood supply was confirmed with blanching and refill.  The pedicle was then wrapped with xeroform gauze and dressed appropriately with a telfa and gauze bandage to ensure continued blood supply and protect the attached pedicle.
Interpolation Flap Text: A decision was made to reconstruct the defect utilizing an interpolation axial flap and a staged reconstruction.  A telfa template was made of the defect.  This telfa template was then used to outline the interpolation flap.  The donor area for the pedicle flap was then injected with anesthesia.  The flap was excised through the skin and subcutaneous tissue down to the layer of the underlying musculature.  The interpolation flap was carefully excised within this deep plane to maintain its blood supply.  The edges of the donor site were undermined.   The donor site was closed in a primary fashion.  The pedicle was then rotated into position and sutured.  Once the tube was sutured into place, adequate blood supply was confirmed with blanching and refill.  The pedicle was then wrapped with xeroform gauze and dressed appropriately with a telfa and gauze bandage to ensure continued blood supply and protect the attached pedicle.
Melolabial Interpolation Flap Text: A decision was made to reconstruct the defect utilizing an interpolation axial flap and a staged reconstruction.  A telfa template was made of the defect.  This telfa template was then used to outline the melolabial interpolation flap.  The donor area for the pedicle flap was then injected with anesthesia.  The flap was excised through the skin and subcutaneous tissue down to the layer of the underlying musculature.  The pedicle flap was carefully excised within this deep plane to maintain its blood supply.  The edges of the donor site were undermined.   The donor site was closed in a primary fashion.  The pedicle was then rotated into position and sutured.  Once the tube was sutured into place, adequate blood supply was confirmed with blanching and refill.  The pedicle was then wrapped with xeroform gauze and dressed appropriately with a telfa and gauze bandage to ensure continued blood supply and protect the attached pedicle.
Mastoid Interpolation Flap Text: A decision was made to reconstruct the defect utilizing an interpolation axial flap and a staged reconstruction.  A telfa template was made of the defect.  This telfa template was then used to outline the mastoid interpolation flap.  The donor area for the pedicle flap was then injected with anesthesia.  The flap was excised through the skin and subcutaneous tissue down to the layer of the underlying musculature.  The pedicle flap was carefully excised within this deep plane to maintain its blood supply.  The edges of the donor site were undermined.   The donor site was closed in a primary fashion.  The pedicle was then rotated into position and sutured.  Once the tube was sutured into place, adequate blood supply was confirmed with blanching and refill.  The pedicle was then wrapped with xeroform gauze and dressed appropriately with a telfa and gauze bandage to ensure continued blood supply and protect the attached pedicle.
Posterior Auricular Interpolation Flap Text: A decision was made to reconstruct the defect utilizing an interpolation axial flap and a staged reconstruction.  A telfa template was made of the defect.  This telfa template was then used to outline the posterior auricular interpolation flap.  The donor area for the pedicle flap was then injected with anesthesia.  The flap was excised through the skin and subcutaneous tissue down to the layer of the underlying musculature.  The pedicle flap was carefully excised within this deep plane to maintain its blood supply.  The edges of the donor site were undermined.   The donor site was closed in a primary fashion.  The pedicle was then rotated into position and sutured.  Once the tube was sutured into place, adequate blood supply was confirmed with blanching and refill.  The pedicle was then wrapped with xeroform gauze and dressed appropriately with a telfa and gauze bandage to ensure continued blood supply and protect the attached pedicle.
Paramedian Forehead Flap Text: A decision was made to reconstruct the defect utilizing an interpolation axial flap and a staged reconstruction.  A telfa template was made of the defect.  This telfa template was then used to outline the paramedian forehead pedicle flap.  The donor area for the pedicle flap was then injected with anesthesia.  The flap was excised through the skin and subcutaneous tissue down to the layer of the underlying musculature.  The pedicle flap was carefully excised within this deep plane to maintain its blood supply.  The edges of the donor site were undermined.   The donor site was closed in a primary fashion.  The pedicle was then rotated into position and sutured.  Once the tube was sutured into place, adequate blood supply was confirmed with blanching and refill.  The pedicle was then wrapped with xeroform gauze and dressed appropriately with a telfa and gauze bandage to ensure continued blood supply and protect the attached pedicle.
Abbe Flap (Upper To Lower Lip) Text: The defect of the lower lip was assessed and measured.  Given the location and size of the defect, an Abbe flap was deemed most appropriate. Using a sterile surgical marker, an appropriate Abbe flap was measured and drawn on the upper lip. Local anesthesia was then infiltrated.  A scalpel was then used to incise the upper lip through and through the skin, vermilion, muscle and mucosa, leaving the flap pedicled on the opposite side.  The flap was then rotated and transferred to the lower lip defect.  The flap was then sutured into place with a three layer technique, closing the orbicularis oris muscle layer with subcutaneous buried sutures, followed by a mucosal layer and an epidermal layer.
Abbe Flap (Lower To Upper Lip) Text: The defect of the upper lip was assessed and measured.  Given the location and size of the defect, an Abbe flap was deemed most appropriate. Using a sterile surgical marker, an appropriate Abbe flap was measured and drawn on the lower lip. Local anesthesia was then infiltrated. A scalpel was then used to incise the upper lip through and through the skin, vermilion, muscle and mucosa, leaving the flap pedicled on the opposite side.  The flap was then rotated and transferred to the lower lip defect.  The flap was then sutured into place with a three layer technique, closing the orbicularis oris muscle layer with subcutaneous buried sutures, followed by a mucosal layer and an epidermal layer.
Estlander Flap (Upper To Lower Lip) Text: The defect of the lower lip was assessed and measured.  Given the location and size of the defect, an Estlander flap was deemed most appropriate. Using a sterile surgical marker, an appropriate Estlander flap was measured and drawn on the upper lip. Local anesthesia was then infiltrated. A scalpel was then used to incise the lateral aspect of the flap, through skin, muscle and mucosa, leaving the flap pedicled medially.  The flap was then rotated and positioned to fill the lower lip defect.  The flap was then sutured into place with a three layer technique, closing the orbicularis oris muscle layer with subcutaneous buried sutures, followed by a mucosal layer and an epidermal layer.
Lip Wedge Excision Repair Text: Given the location of the defect and the proximity to free margins a full thickness wedge repair was deemed most appropriate. Using a sterile surgical marker, the appropriate repair was drawn incorporating the defect and placing the expected incisions perpendicular to the vermilion border.  The vermilion border was also meticulously outlined to ensure appropriate reapproximation during the repair.  The area thus outlined was incised through and through with a #15 scalpel blade.  The muscularis and dermis were reaproximated with deep sutures following hemostasis. Care was taken to realign the vermilion border before proceeding with the superficial closure.  Once the vermilion was realigned the superfical and mucosal closure was finished.
Ftsg Text: The defect edges were debeveled with a #15 scalpel blade. Given the location of the defect, shape of the defect and the proximity to free margins a full thickness skin graft was deemed most appropriate. Using a sterile surgical marker, the primary defect shape was transferred to the donor site. The area thus outlined was incised deep to adipose tissue with a #15 scalpel blade.  The harvested graft was then trimmed of adipose tissue until only dermis and epidermis was left.  The skin margins of the secondary defect were undermined to an appropriate distance in all directions utilizing iris scissors.  The secondary defect was closed with interrupted buried subcutaneous sutures.  The skin edges were then re-apposed with running  sutures.  The skin graft was then placed in the primary defect and oriented appropriately.
Split-Thickness Skin Graft Text: The defect edges were debeveled with a #15 scalpel blade. Given the location of the defect, shape of the defect and the proximity to free margins a split thickness skin graft was deemed most appropriate. Using a sterile surgical marker, the primary defect shape was transferred to the donor site. The split thickness graft was then harvested.  The skin graft was then placed in the primary defect and oriented appropriately.
Pinch Graft Text: The defect edges were debeveled with a #15 scalpel blade. Given the location of the defect, shape of the defect and the proximity to free margins a pinch graft was deemed most appropriate. Using a sterile surgical marker, the primary defect shape was transferred to the donor site. The area thus outlined was incised deep to adipose tissue with a #15 scalpel blade.  The harvested graft was then trimmed of adipose tissue until only dermis and epidermis was left. The skin margins of the secondary defect were undermined to an appropriate distance in all directions utilizing iris scissors.  The secondary defect was closed with interrupted buried subcutaneous sutures.  The skin edges were then re-apposed with running  sutures.  The skin graft was then placed in the primary defect and oriented appropriately.
Burow's Graft Text: The defect edges were debeveled with a #15 scalpel blade. Given the location of the defect, shape of the defect, the proximity to free margins and the presence of a standing cone deformity a Burow's skin graft was deemed most appropriate. The standing cone was removed and this tissue was then trimmed to the shape of the primary defect. The adipose tissue was also removed until only dermis and epidermis were left.  The skin margins of the secondary defect were undermined to an appropriate distance in all directions utilizing iris scissors.  The secondary defect was closed with interrupted buried subcutaneous sutures.  The skin edges were then re-apposed with running  sutures.  The skin graft was then placed in the primary defect and oriented appropriately.
Cartilage Graft Text: The defect edges were debeveled with a #15 scalpel blade. Given the location of the defect, shape of the defect, the fact the defect involved a full thickness cartilage defect a cartilage graft was deemed most appropriate.  An appropriate donor site was identified, cleansed, and anesthetized. The cartilage graft was then harvested and transferred to the recipient site, oriented appropriately and then sutured into place.  The secondary defect was then repaired using a primary closure.
Composite Graft Text: The defect edges were debeveled with a #15 scalpel blade. Given the location of the defect, shape of the defect, the proximity to free margins and the fact the defect was full thickness a composite graft was deemed most appropriate.  The defect was outline and then transferred to the donor site.  A full thickness graft was then excised from the donor site. The graft was then placed in the primary defect, oriented appropriately and then sutured into place.  The secondary defect was then repaired using a primary closure.
Epidermal Autograft Text: The defect edges were debeveled with a #15 scalpel blade. Given the location of the defect, shape of the defect and the proximity to free margins an epidermal autograft was deemed most appropriate. Using a sterile surgical marker, the primary defect shape was transferred to the donor site. The epidermal graft was then harvested.  The skin graft was then placed in the primary defect and oriented appropriately.
Dermal Autograft Text: The defect edges were debeveled with a #15 scalpel blade. Given the location of the defect, shape of the defect and the proximity to free margins a dermal autograft was deemed most appropriate. Using a sterile surgical marker, the primary defect shape was transferred to the donor site. The area thus outlined was incised deep to adipose tissue with a #15 scalpel blade.  The harvested graft was then trimmed of adipose and epidermal tissue until only dermis was left.  The skin graft was then placed in the primary defect and oriented appropriately.
Skin Substitute Text: The defect edges were debeveled with a #15 scalpel blade. Given the location of the defect, shape of the defect and the proximity to free margins a skin substitute graft was deemed most appropriate.  The graft material was trimmed to fit the size of the defect. The graft was then placed in the primary defect and oriented appropriately.
Tissue Cultured Epidermal Autograft Text: The defect edges were debeveled with a #15 scalpel blade. Given the location of the defect, shape of the defect and the proximity to free margins a tissue cultured epidermal autograft was deemed most appropriate.  The graft was then trimmed to fit the size of the defect.  The graft was then placed in the primary defect and oriented appropriately.
Xenograft Text: The defect edges were debeveled with a #15 scalpel blade. Given the location of the defect, shape of the defect and the proximity to free margins a xenograft was deemed most appropriate.  The graft was then trimmed to fit the size of the defect.  The graft was then placed in the primary defect and oriented appropriately.
Purse String (Intermediate) Text: Given the location of the defect and the characteristics of the surrounding skin a purse string intermediate closure was deemed most appropriate.  Undermining was performed circumferentially around the surgical defect.  A purse string suture was then placed and tightened.
Purse String (Simple) Text: Given the location of the defect and the characteristics of the surrounding skin a purse string simple closure was deemed most appropriate.  Undermining was performed circumferentially around the surgical defect.  A purse string suture was then placed and tightened.
Partial Purse String (Intermediate) Text: Given the location of the defect and the characteristics of the surrounding skin an intermediate purse string closure was deemed most appropriate.  Undermining was performed circumferentially around the surgical defect.  A purse string suture was then placed and tightened. Wound tension of the circular defect prevented complete closure of the wound.
Partial Purse String (Simple) Text: Given the location of the defect and the characteristics of the surrounding skin a simple purse string closure was deemed most appropriate.  Undermining was performed circumferentially around the surgical defect.  A purse string suture was then placed and tightened. Wound tension of the circular defect prevented complete closure of the wound.
Complex Repair And Single Advancement Flap Text: The defect edges were debeveled with a #15 scalpel blade.  The primary defect was closed partially with a complex linear closure.  Given the location of the remaining defect, shape of the defect and the proximity to free margins a single advancement flap was deemed most appropriate for complete closure of the defect.  Using a sterile surgical marker, an appropriate advancement flap was drawn incorporating the defect and placing the expected incisions within the relaxed skin tension lines where possible. The area thus outlined was incised deep to adipose tissue with a #15 scalpel blade. The skin margins were undermined to an appropriate distance in all directions utilizing iris scissors and carried over to close the primary defect.
Complex Repair And Double Advancement Flap Text: The defect edges were debeveled with a #15 scalpel blade.  The primary defect was closed partially with a complex linear closure.  Given the location of the remaining defect, shape of the defect and the proximity to free margins a double advancement flap was deemed most appropriate for complete closure of the defect.  Using a sterile surgical marker, an appropriate advancement flap was drawn incorporating the defect and placing the expected incisions within the relaxed skin tension lines where possible. The area thus outlined was incised deep to adipose tissue with a #15 scalpel blade. The skin margins were undermined to an appropriate distance in all directions utilizing iris scissors and carried over to close the primary defect.
Complex Repair And Modified Advancement Flap Text: The defect edges were debeveled with a #15 scalpel blade.  The primary defect was closed partially with a complex linear closure.  Given the location of the remaining defect, shape of the defect and the proximity to free margins a modified advancement flap was deemed most appropriate for complete closure of the defect.  Using a sterile surgical marker, an appropriate advancement flap was drawn incorporating the defect and placing the expected incisions within the relaxed skin tension lines where possible. The area thus outlined was incised deep to adipose tissue with a #15 scalpel blade. The skin margins were undermined to an appropriate distance in all directions utilizing iris scissors and carried over to close the primary defect.
Complex Repair And A-T Advancement Flap Text: The defect edges were debeveled with a #15 scalpel blade.  The primary defect was closed partially with a complex linear closure.  Given the location of the remaining defect, shape of the defect and the proximity to free margins an A-T advancement flap was deemed most appropriate for complete closure of the defect.  Using a sterile surgical marker, an appropriate advancement flap was drawn incorporating the defect and placing the expected incisions within the relaxed skin tension lines where possible. The area thus outlined was incised deep to adipose tissue with a #15 scalpel blade. The skin margins were undermined to an appropriate distance in all directions utilizing iris scissors and carried over to close the primary defect.
Complex Repair And O-T Advancement Flap Text: The defect edges were debeveled with a #15 scalpel blade.  The primary defect was closed partially with a complex linear closure.  Given the location of the remaining defect, shape of the defect and the proximity to free margins an O-T advancement flap was deemed most appropriate for complete closure of the defect.  Using a sterile surgical marker, an appropriate advancement flap was drawn incorporating the defect and placing the expected incisions within the relaxed skin tension lines where possible. The area thus outlined was incised deep to adipose tissue with a #15 scalpel blade. The skin margins were undermined to an appropriate distance in all directions utilizing iris scissors and carried over to close the primary defect.
Complex Repair And O-L Flap Text: The defect edges were debeveled with a #15 scalpel blade.  The primary defect was closed partially with a complex linear closure.  Given the location of the remaining defect, shape of the defect and the proximity to free margins an O-L flap was deemed most appropriate for complete closure of the defect.  Using a sterile surgical marker, an appropriate flap was drawn incorporating the defect and placing the expected incisions within the relaxed skin tension lines where possible. The area thus outlined was incised deep to adipose tissue with a #15 scalpel blade. The skin margins were undermined to an appropriate distance in all directions utilizing iris scissors and carried over to close the primary defect.
Complex Repair And Bilobe Flap Text: The defect edges were debeveled with a #15 scalpel blade.  The primary defect was closed partially with a complex linear closure.  Given the location of the remaining defect, shape of the defect and the proximity to free margins a bilobe flap was deemed most appropriate for complete closure of the defect.  Using a sterile surgical marker, an appropriate advancement flap was drawn incorporating the defect and placing the expected incisions within the relaxed skin tension lines where possible. The area thus outlined was incised deep to adipose tissue with a #15 scalpel blade. The skin margins were undermined to an appropriate distance in all directions utilizing iris scissors and carried over to close the primary defect.
Complex Repair And Melolabial Flap Text: The defect edges were debeveled with a #15 scalpel blade.  The primary defect was closed partially with a complex linear closure.  Given the location of the remaining defect, shape of the defect and the proximity to free margins a melolabial flap was deemed most appropriate for complete closure of the defect.  Using a sterile surgical marker, an appropriate advancement flap was drawn incorporating the defect and placing the expected incisions within the relaxed skin tension lines where possible. The area thus outlined was incised deep to adipose tissue with a #15 scalpel blade. The skin margins were undermined to an appropriate distance in all directions utilizing iris scissors and carried over to close the primary defect.
Complex Repair And Rotation Flap Text: The defect edges were debeveled with a #15 scalpel blade.  The primary defect was closed partially with a complex linear closure.  Given the location of the remaining defect, shape of the defect and the proximity to free margins a rotation flap was deemed most appropriate for complete closure of the defect.  Using a sterile surgical marker, an appropriate advancement flap was drawn incorporating the defect and placing the expected incisions within the relaxed skin tension lines where possible. The area thus outlined was incised deep to adipose tissue with a #15 scalpel blade. The skin margins were undermined to an appropriate distance in all directions utilizing iris scissors and carried over to close the primary defect.
Complex Repair And Rhombic Flap Text: The defect edges were debeveled with a #15 scalpel blade.  The primary defect was closed partially with a complex linear closure.  Given the location of the remaining defect, shape of the defect and the proximity to free margins a rhombic flap was deemed most appropriate for complete closure of the defect.  Using a sterile surgical marker, an appropriate advancement flap was drawn incorporating the defect and placing the expected incisions within the relaxed skin tension lines where possible. The area thus outlined was incised deep to adipose tissue with a #15 scalpel blade. The skin margins were undermined to an appropriate distance in all directions utilizing iris scissors and carried over to close the primary defect.
Complex Repair And Transposition Flap Text: The defect edges were debeveled with a #15 scalpel blade.  The primary defect was closed partially with a complex linear closure.  Given the location of the remaining defect, shape of the defect and the proximity to free margins a transposition flap was deemed most appropriate for complete closure of the defect.  Using a sterile surgical marker, an appropriate advancement flap was drawn incorporating the defect and placing the expected incisions within the relaxed skin tension lines where possible. The area thus outlined was incised deep to adipose tissue with a #15 scalpel blade. The skin margins were undermined to an appropriate distance in all directions utilizing iris scissors and carried over to close the primary defect.
Complex Repair And V-Y Plasty Text: The defect edges were debeveled with a #15 scalpel blade.  The primary defect was closed partially with a complex linear closure.  Given the location of the remaining defect, shape of the defect and the proximity to free margins a V-Y plasty was deemed most appropriate for complete closure of the defect.  Using a sterile surgical marker, an appropriate advancement flap was drawn incorporating the defect and placing the expected incisions within the relaxed skin tension lines where possible. The area thus outlined was incised deep to adipose tissue with a #15 scalpel blade. The skin margins were undermined to an appropriate distance in all directions utilizing iris scissors and carried over to close the primary defect.
Complex Repair And M Plasty Text: The defect edges were debeveled with a #15 scalpel blade.  The primary defect was closed partially with a complex linear closure.  Given the location of the remaining defect, shape of the defect and the proximity to free margins an M plasty was deemed most appropriate for complete closure of the defect.  Using a sterile surgical marker, an appropriate advancement flap was drawn incorporating the defect and placing the expected incisions within the relaxed skin tension lines where possible. The area thus outlined was incised deep to adipose tissue with a #15 scalpel blade. The skin margins were undermined to an appropriate distance in all directions utilizing iris scissors and carried over to close the primary defect.
Complex Repair And Double M Plasty Text: The defect edges were debeveled with a #15 scalpel blade.  The primary defect was closed partially with a complex linear closure.  Given the location of the remaining defect, shape of the defect and the proximity to free margins a double M plasty was deemed most appropriate for complete closure of the defect.  Using a sterile surgical marker, an appropriate advancement flap was drawn incorporating the defect and placing the expected incisions within the relaxed skin tension lines where possible. The area thus outlined was incised deep to adipose tissue with a #15 scalpel blade. The skin margins were undermined to an appropriate distance in all directions utilizing iris scissors and carried over to close the primary defect.
Complex Repair And W Plasty Text: The defect edges were debeveled with a #15 scalpel blade.  The primary defect was closed partially with a complex linear closure.  Given the location of the remaining defect, shape of the defect and the proximity to free margins a W plasty was deemed most appropriate for complete closure of the defect.  Using a sterile surgical marker, an appropriate advancement flap was drawn incorporating the defect and placing the expected incisions within the relaxed skin tension lines where possible. The area thus outlined was incised deep to adipose tissue with a #15 scalpel blade. The skin margins were undermined to an appropriate distance in all directions utilizing iris scissors and carried over to close the primary defect.
Complex Repair And Z Plasty Text: The defect edges were debeveled with a #15 scalpel blade.  The primary defect was closed partially with a complex linear closure.  Given the location of the remaining defect, shape of the defect and the proximity to free margins a Z plasty was deemed most appropriate for complete closure of the defect.  Using a sterile surgical marker, an appropriate advancement flap was drawn incorporating the defect and placing the expected incisions within the relaxed skin tension lines where possible. The area thus outlined was incised deep to adipose tissue with a #15 scalpel blade. The skin margins were undermined to an appropriate distance in all directions utilizing iris scissors and carried over to close the primary defect.
Complex Repair And Dorsal Nasal Flap Text: The defect edges were debeveled with a #15 scalpel blade.  The primary defect was closed partially with a complex linear closure.  Given the location of the remaining defect, shape of the defect and the proximity to free margins a dorsal nasal flap was deemed most appropriate for complete closure of the defect.  Using a sterile surgical marker, an appropriate flap was drawn incorporating the defect and placing the expected incisions within the relaxed skin tension lines where possible. The area thus outlined was incised deep to adipose tissue with a #15 scalpel blade. The skin margins were undermined to an appropriate distance in all directions utilizing iris scissors and carried over to close the primary defect.
Complex Repair And Ftsg Text: The defect edges were debeveled with a #15 scalpel blade.  The primary defect was closed partially with a complex linear closure.  Given the location of the defect, shape of the defect and the proximity to free margins a full thickness skin graft was deemed most appropriate to repair the remaining defect.  The graft was trimmed to fit the size of the remaining defect.  The graft was then placed in the primary defect, oriented appropriately, and sutured into place.
Complex Repair And Burow's Graft Text: The defect edges were debeveled with a #15 scalpel blade.  The primary defect was closed partially with a complex linear closure.  Given the location of the defect, shape of the defect, the proximity to free margins and the presence of a standing cone deformity a Burow's graft was deemed most appropriate to repair the remaining defect.  The graft was trimmed to fit the size of the remaining defect.  The graft was then placed in the primary defect, oriented appropriately, and sutured into place.
Complex Repair And Split-Thickness Skin Graft Text: The defect edges were debeveled with a #15 scalpel blade.  The primary defect was closed partially with a complex linear closure.  Given the location of the defect, shape of the defect and the proximity to free margins a split thickness skin graft was deemed most appropriate to repair the remaining defect.  The graft was trimmed to fit the size of the remaining defect.  The graft was then placed in the primary defect, oriented appropriately, and sutured into place.
Complex Repair And Epidermal Autograft Text: The defect edges were debeveled with a #15 scalpel blade.  The primary defect was closed partially with a complex linear closure.  Given the location of the defect, shape of the defect and the proximity to free margins an epidermal autograft was deemed most appropriate to repair the remaining defect.  The graft was trimmed to fit the size of the remaining defect.  The graft was then placed in the primary defect, oriented appropriately, and sutured into place.
Complex Repair And Dermal Autograft Text: The defect edges were debeveled with a #15 scalpel blade.  The primary defect was closed partially with a complex linear closure.  Given the location of the defect, shape of the defect and the proximity to free margins an dermal autograft was deemed most appropriate to repair the remaining defect.  The graft was trimmed to fit the size of the remaining defect.  The graft was then placed in the primary defect, oriented appropriately, and sutured into place.
Complex Repair And Tissue Cultured Epidermal Autograft Text: The defect edges were debeveled with a #15 scalpel blade.  The primary defect was closed partially with a complex linear closure.  Given the location of the defect, shape of the defect and the proximity to free margins an tissue cultured epidermal autograft was deemed most appropriate to repair the remaining defect.  The graft was trimmed to fit the size of the remaining defect.  The graft was then placed in the primary defect, oriented appropriately, and sutured into place.
Complex Repair And Xenograft Text: The defect edges were debeveled with a #15 scalpel blade.  The primary defect was closed partially with a complex linear closure.  Given the location of the defect, shape of the defect and the proximity to free margins a xenograft was deemed most appropriate to repair the remaining defect.  The graft was trimmed to fit the size of the remaining defect.  The graft was then placed in the primary defect, oriented appropriately, and sutured into place.
Complex Repair And Skin Substitute Graft Text: The defect edges were debeveled with a #15 scalpel blade.  The primary defect was closed partially with a complex linear closure.  Given the location of the remaining defect, shape of the defect and the proximity to free margins a skin substitute graft was deemed most appropriate to repair the remaining defect.  The graft was trimmed to fit the size of the remaining defect.  The graft was then placed in the primary defect, oriented appropriately, and sutured into place.
Path Notes (To The Dermatopathologist): Please check margins.
Consent was obtained from the patient. The risks and benefits to therapy were discussed in detail. Specifically, the risks of infection, scarring, bleeding, prolonged wound healing, incomplete removal, allergy to anesthesia, nerve injury and recurrence were addressed. Prior to the procedure, the treatment site was clearly identified and confirmed by the patient. All components of Universal Protocol/PAUSE Rule completed.
Render Post-Care Instructions In Note?: yes
Post-Care Instructions: I reviewed with the patient in detail post-care instructions. Patient is not to engage in any heavy lifting, exercise, or swimming for the next 14 days. Should the patient develop any fevers, chills, bleeding, severe pain patient will contact the office immediately.
Home Suture Removal Text: Patient was provided a home suture removal kit and will remove their sutures at home.  If they have any questions or difficulties they will call the office.
Detail Level: Detailed
Where Do You Want The Question To Include Opioid Counseling Located?: Case Summary Tab
Billing Type: Third-Party Bill
Information: Selecting Yes will display possible errors in your note based on the variables you have selected. This validation is only offered as a suggestion for you. PLEASE NOTE THAT THE VALIDATION TEXT WILL BE REMOVED WHEN YOU FINALIZE YOUR NOTE. IF YOU WANT TO FAX A PRELIMINARY NOTE YOU WILL NEED TO TOGGLE THIS TO 'NO' IF YOU DO NOT WANT IT IN YOUR FAXED NOTE.

## 2024-09-12 ENCOUNTER — LAB REQUISITION (OUTPATIENT)
Dept: LAB | Facility: CLINIC | Age: 79
End: 2024-09-12
Payer: COMMERCIAL

## 2024-09-12 DIAGNOSIS — J44.9 CHRONIC OBSTRUCTIVE PULMONARY DISEASE, UNSPECIFIED (H): ICD-10-CM

## 2024-09-12 LAB
ANION GAP SERPL CALCULATED.3IONS-SCNC: 14 MMOL/L (ref 7–15)
BUN SERPL-MCNC: 17.9 MG/DL (ref 8–23)
CALCIUM SERPL-MCNC: 8.8 MG/DL (ref 8.8–10.4)
CHLORIDE SERPL-SCNC: 95 MMOL/L (ref 98–107)
CREAT SERPL-MCNC: 0.79 MG/DL (ref 0.67–1.17)
EGFRCR SERPLBLD CKD-EPI 2021: 89 ML/MIN/1.73M2
GLUCOSE SERPL-MCNC: 152 MG/DL (ref 70–99)
HCO3 SERPL-SCNC: 27 MMOL/L (ref 22–29)
POTASSIUM SERPL-SCNC: 4.9 MMOL/L (ref 3.4–5.3)
SODIUM SERPL-SCNC: 136 MMOL/L (ref 135–145)

## 2024-09-12 PROCEDURE — 80048 BASIC METABOLIC PNL TOTAL CA: CPT | Mod: ORL

## 2025-03-22 ENCOUNTER — HEALTH MAINTENANCE LETTER (OUTPATIENT)
Age: 80
End: 2025-03-22